# Patient Record
Sex: FEMALE | Race: WHITE | NOT HISPANIC OR LATINO
[De-identification: names, ages, dates, MRNs, and addresses within clinical notes are randomized per-mention and may not be internally consistent; named-entity substitution may affect disease eponyms.]

---
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
## 2020-04-28 NOTE — ASU PREOP CHECKLIST, PEDIATRIC - TEMP(CELSIUS)
Outpatient Medications Marked as Taking for the 4/28/20 encounter (Refill) with Tc Price, DO   Medication Sig Dispense Refill   • metFORMIN (GLUCOPHAGE) 500 MG tablet Take 2 tablets by mouth 2 times daily (with meals). 360 tablet 3     Last refill 5/10/19   Last Visit: 2/20/2020  Next Visit: 8/20/2020    Labs:     Hemoglobin A1C (%)   Date Value   02/26/2020 7.7 (H)   05/10/2019 7.5 (H)       Refilled per [facility].   36.5

## 2020-10-13 ENCOUNTER — EMERGENCY (EMERGENCY)
Facility: HOSPITAL | Age: 20
LOS: 0 days | Discharge: HOME | End: 2020-10-13
Attending: EMERGENCY MEDICINE
Payer: COMMERCIAL

## 2020-10-13 VITALS
OXYGEN SATURATION: 97 % | SYSTOLIC BLOOD PRESSURE: 94 MMHG | RESPIRATION RATE: 18 BRPM | DIASTOLIC BLOOD PRESSURE: 52 MMHG | HEART RATE: 50 BPM | TEMPERATURE: 98 F

## 2020-10-13 DIAGNOSIS — R10.9 UNSPECIFIED ABDOMINAL PAIN: ICD-10-CM

## 2020-10-13 DIAGNOSIS — Z87.19 PERSONAL HISTORY OF OTHER DISEASES OF THE DIGESTIVE SYSTEM: ICD-10-CM

## 2020-10-13 DIAGNOSIS — G40.909 EPILEPSY, UNSPECIFIED, NOT INTRACTABLE, WITHOUT STATUS EPILEPTICUS: ICD-10-CM

## 2020-10-13 DIAGNOSIS — K90.49 MALABSORPTION DUE TO INTOLERANCE, NOT ELSEWHERE CLASSIFIED: ICD-10-CM

## 2020-10-13 DIAGNOSIS — E28.2 POLYCYSTIC OVARIAN SYNDROME: ICD-10-CM

## 2020-10-13 DIAGNOSIS — Z79.4 LONG TERM (CURRENT) USE OF INSULIN: ICD-10-CM

## 2020-10-13 DIAGNOSIS — R11.0 NAUSEA: ICD-10-CM

## 2020-10-13 DIAGNOSIS — Z96.41 PRESENCE OF INSULIN PUMP (EXTERNAL) (INTERNAL): ICD-10-CM

## 2020-10-13 DIAGNOSIS — Z91.012 ALLERGY TO EGGS: ICD-10-CM

## 2020-10-13 DIAGNOSIS — Z98.89 OTHER SPECIFIED POSTPROCEDURAL STATES: Chronic | ICD-10-CM

## 2020-10-13 DIAGNOSIS — Z88.8 ALLERGY STATUS TO OTHER DRUGS, MEDICAMENTS AND BIOLOGICAL SUBSTANCES: ICD-10-CM

## 2020-10-13 DIAGNOSIS — Z86.39 PERSONAL HISTORY OF OTHER ENDOCRINE, NUTRITIONAL AND METABOLIC DISEASE: ICD-10-CM

## 2020-10-13 DIAGNOSIS — K76.0 FATTY (CHANGE OF) LIVER, NOT ELSEWHERE CLASSIFIED: ICD-10-CM

## 2020-10-13 DIAGNOSIS — E11.9 TYPE 2 DIABETES MELLITUS WITHOUT COMPLICATIONS: ICD-10-CM

## 2020-10-13 LAB
ALBUMIN SERPL ELPH-MCNC: 3.7 G/DL — SIGNIFICANT CHANGE UP (ref 3.3–5)
ALP SERPL-CCNC: 85 U/L — SIGNIFICANT CHANGE UP (ref 40–120)
ALT FLD-CCNC: 24 U/L — SIGNIFICANT CHANGE UP (ref 12–78)
ANION GAP SERPL CALC-SCNC: 6 MMOL/L — SIGNIFICANT CHANGE UP (ref 5–17)
APPEARANCE UR: CLEAR — SIGNIFICANT CHANGE UP
AST SERPL-CCNC: 24 U/L — SIGNIFICANT CHANGE UP (ref 15–37)
BASOPHILS # BLD AUTO: 0.02 K/UL — SIGNIFICANT CHANGE UP (ref 0–0.2)
BASOPHILS NFR BLD AUTO: 0.4 % — SIGNIFICANT CHANGE UP (ref 0–2)
BILIRUB SERPL-MCNC: 0.4 MG/DL — SIGNIFICANT CHANGE UP (ref 0.2–1.2)
BILIRUB UR-MCNC: NEGATIVE — SIGNIFICANT CHANGE UP
BUN SERPL-MCNC: 11 MG/DL — SIGNIFICANT CHANGE UP (ref 7–23)
CALCIUM SERPL-MCNC: 9.1 MG/DL — SIGNIFICANT CHANGE UP (ref 8.5–10.1)
CHLORIDE SERPL-SCNC: 106 MMOL/L — SIGNIFICANT CHANGE UP (ref 96–108)
CO2 SERPL-SCNC: 25 MMOL/L — SIGNIFICANT CHANGE UP (ref 22–31)
COLOR SPEC: YELLOW — SIGNIFICANT CHANGE UP
CREAT SERPL-MCNC: 1.04 MG/DL — SIGNIFICANT CHANGE UP (ref 0.5–1.3)
DIFF PNL FLD: NEGATIVE — SIGNIFICANT CHANGE UP
EOSINOPHIL # BLD AUTO: 0.11 K/UL — SIGNIFICANT CHANGE UP (ref 0–0.5)
EOSINOPHIL NFR BLD AUTO: 2 % — SIGNIFICANT CHANGE UP (ref 0–6)
GLUCOSE SERPL-MCNC: 92 MG/DL — SIGNIFICANT CHANGE UP (ref 70–99)
GLUCOSE UR QL: NEGATIVE MG/DL — SIGNIFICANT CHANGE UP
HCG SERPL-ACNC: <1 MIU/ML — SIGNIFICANT CHANGE UP
HCT VFR BLD CALC: 38.9 % — SIGNIFICANT CHANGE UP (ref 34.5–45)
HGB BLD-MCNC: 12.4 G/DL — SIGNIFICANT CHANGE UP (ref 11.5–15.5)
IMM GRANULOCYTES NFR BLD AUTO: 0.2 % — SIGNIFICANT CHANGE UP (ref 0–1.5)
KETONES UR-MCNC: ABNORMAL
LEUKOCYTE ESTERASE UR-ACNC: ABNORMAL
LIDOCAIN IGE QN: 19 U/L — LOW (ref 73–393)
LYMPHOCYTES # BLD AUTO: 2.63 K/UL — SIGNIFICANT CHANGE UP (ref 1–3.3)
LYMPHOCYTES # BLD AUTO: 48.3 % — HIGH (ref 13–44)
MCHC RBC-ENTMCNC: 30.6 PG — SIGNIFICANT CHANGE UP (ref 27–34)
MCHC RBC-ENTMCNC: 31.9 GM/DL — LOW (ref 32–36)
MCV RBC AUTO: 96 FL — SIGNIFICANT CHANGE UP (ref 80–100)
MONOCYTES # BLD AUTO: 0.21 K/UL — SIGNIFICANT CHANGE UP (ref 0–0.9)
MONOCYTES NFR BLD AUTO: 3.9 % — SIGNIFICANT CHANGE UP (ref 2–14)
NEUTROPHILS # BLD AUTO: 2.47 K/UL — SIGNIFICANT CHANGE UP (ref 1.8–7.4)
NEUTROPHILS NFR BLD AUTO: 45.2 % — SIGNIFICANT CHANGE UP (ref 43–77)
NITRITE UR-MCNC: NEGATIVE — SIGNIFICANT CHANGE UP
PH UR: 5 — SIGNIFICANT CHANGE UP (ref 5–8)
PLATELET # BLD AUTO: 224 K/UL — SIGNIFICANT CHANGE UP (ref 150–400)
POTASSIUM SERPL-MCNC: 4.1 MMOL/L — SIGNIFICANT CHANGE UP (ref 3.5–5.3)
POTASSIUM SERPL-SCNC: 4.1 MMOL/L — SIGNIFICANT CHANGE UP (ref 3.5–5.3)
PROT SERPL-MCNC: 8 GM/DL — SIGNIFICANT CHANGE UP (ref 6–8.3)
PROT UR-MCNC: NEGATIVE MG/DL — SIGNIFICANT CHANGE UP
RBC # BLD: 4.05 M/UL — SIGNIFICANT CHANGE UP (ref 3.8–5.2)
RBC # FLD: 12.6 % — SIGNIFICANT CHANGE UP (ref 10.3–14.5)
SODIUM SERPL-SCNC: 137 MMOL/L — SIGNIFICANT CHANGE UP (ref 135–145)
SP GR SPEC: 1 — LOW (ref 1.01–1.02)
UROBILINOGEN FLD QL: NEGATIVE MG/DL — SIGNIFICANT CHANGE UP
WBC # BLD: 5.45 K/UL — SIGNIFICANT CHANGE UP (ref 3.8–10.5)
WBC # FLD AUTO: 5.45 K/UL — SIGNIFICANT CHANGE UP (ref 3.8–10.5)

## 2020-10-13 PROCEDURE — 85025 COMPLETE CBC W/AUTO DIFF WBC: CPT

## 2020-10-13 PROCEDURE — 76705 ECHO EXAM OF ABDOMEN: CPT

## 2020-10-13 PROCEDURE — 36415 COLL VENOUS BLD VENIPUNCTURE: CPT

## 2020-10-13 PROCEDURE — 99284 EMERGENCY DEPT VISIT MOD MDM: CPT | Mod: 25

## 2020-10-13 PROCEDURE — 83690 ASSAY OF LIPASE: CPT

## 2020-10-13 PROCEDURE — 99284 EMERGENCY DEPT VISIT MOD MDM: CPT

## 2020-10-13 PROCEDURE — 81001 URINALYSIS AUTO W/SCOPE: CPT

## 2020-10-13 PROCEDURE — 76705 ECHO EXAM OF ABDOMEN: CPT | Mod: 26

## 2020-10-13 PROCEDURE — 84702 CHORIONIC GONADOTROPIN TEST: CPT

## 2020-10-13 PROCEDURE — 80053 COMPREHEN METABOLIC PANEL: CPT

## 2020-10-13 RX ORDER — LIDOCAINE 4 G/100G
10 CREAM TOPICAL ONCE
Refills: 0 | Status: COMPLETED | OUTPATIENT
Start: 2020-10-13 | End: 2020-10-13

## 2020-10-13 RX ORDER — SODIUM CHLORIDE 9 MG/ML
1000 INJECTION INTRAMUSCULAR; INTRAVENOUS; SUBCUTANEOUS ONCE
Refills: 0 | Status: COMPLETED | OUTPATIENT
Start: 2020-10-13 | End: 2020-10-13

## 2020-10-13 RX ADMIN — Medication 30 MILLILITER(S): at 19:00

## 2020-10-13 RX ADMIN — LIDOCAINE 10 MILLILITER(S): 4 CREAM TOPICAL at 19:00

## 2020-10-13 RX ADMIN — SODIUM CHLORIDE 1000 MILLILITER(S): 9 INJECTION INTRAMUSCULAR; INTRAVENOUS; SUBCUTANEOUS at 17:25

## 2020-10-13 NOTE — ED STATDOCS - NS_ATTENDINGSCRIBE_ED_ALL_ED
MARK ANTHONY Almonte
I personally performed the service described in the documentation recorded by the scribe in my presence, and it accurately and completely records my words and actions.

## 2020-10-13 NOTE — ED STATDOCS - OBJECTIVE STATEMENT
19 y/o female with PMHx of DM on insulin pump, pancreatitis with pancreatic stent, ADD, epilepsy, hepatic steatosis, polycystic ovarian syndrome presents to the ED c/o abd pain x13 days. Pt's mother states she is on a liquid diet, and taking Tylenol and Pepcid, and has been feeling increasing uncomfortable. Denies vomiting, endorses nausea, abd discomfort. GI: Dr. Ireland.

## 2020-10-13 NOTE — ED STATDOCS - PROGRESS NOTE DETAILS
signed Annia Crockett PA-C Pt seen initially in intake by Dr Chang.   20F here for abd pain x 2 weeks, has hx of pancreatitis. TRIP Ireland. No significant findings on labwork or imaging. Pt notes she has pain after eating, but tylenol and advil helps. Advise no apparent pancreatitis on labs or imaging today. Recommend stop Advil, continue pepcid, add Mylanta or Tums PRN. f/u Shu tomorrow. return precautions given. I spoke to Dr Ireland on phone regarding pt, he agrees with DC and plan of care. Pt feeling well, pt and family agree with DC and plan of care.

## 2020-10-13 NOTE — ED STATDOCS - CLINICAL SUMMARY MEDICAL DECISION MAKING FREE TEXT BOX
19 y/o with DM with recurrent pancreatitis told to come to ED by GI Dr. Ireland. Pt in no acute distress, non pain. Will check labs, IV fluids, put a call out to Shu.

## 2020-10-13 NOTE — ED STATDOCS - ATTENDING CONTRIBUTION TO CARE
I,Freedom Chang MD,  performed the initial face to face bedside interview with this patient regarding history of present illness, review of symptoms and relevant past medical, social and family history.  I completed an independent physical examination.  I was the initial provider who evaluated this patient. I have signed out the follow up of any pending tests (i.e. labs, radiological studies) to the ACP.  I have communicated the patient’s plan of care and disposition with the ACP.  The history, relevant review of systems, past medical and surgical history, medical decision making, and physical examination was documented by the scribe in my presence and I attest to the accuracy of the documentation.

## 2020-10-13 NOTE — ED STATDOCS - PATIENT PORTAL LINK FT
You can access the FollowMyHealth Patient Portal offered by [facility] by registering at the following website: [email or web address]. By joining [employer]’s FollowMyHealth portal, you will also be able to view your health information using other applications (apps) compatible with our system.

## 2020-10-13 NOTE — ED ADULT NURSE NOTE - OBJECTIVE STATEMENT
Pt c/o epigastric pain radiating to the left abd. h/o pancreatitis, diabetes ,epilepsy. Pt has Insulin pump and cover herself with Novolog.

## 2020-10-13 NOTE — ED STATDOCS - NSFOLLOWUPINSTRUCTIONS_ED_ALL_ED_FT
Acute Abdominal Pain    WHAT YOU NEED TO KNOW:    The cause of your abdominal pain may not be found. If a cause is found, treatment will depend on what the cause is.     DISCHARGE INSTRUCTIONS:    Return to the emergency department if:     You vomit blood or cannot stop vomiting.      You have blood in your bowel movement or it looks like tar.       You have bleeding from your rectum.       Your abdomen is larger than usual, more painful, and hard.       You have severe pain in your abdomen.       You stop passing gas and having bowel movements.       You feel weak, dizzy, or faint.    Contact your healthcare provider if:     You have a fever.      You have new signs and symptoms.      Your symptoms do not get better with treatment.       You have questions or concerns about your condition or care.    Medicines may be given to decrease pain, treat an infection, and manage your symptoms. Take your medicine as directed. Call your healthcare provider if you think your medicine is not helping or if you have side effects. Tell him if you are allergic to any medicine. Keep a list of the medicines, vitamins, and herbs you take. Include the amounts, and when and why you take them. Bring the list or the pill bottles to follow-up visits. Carry your medicine list with you in case of an emergency.    Manage your symptoms:     Apply heat on your abdomen for 20 to 30 minutes every 2 hours for as many days as directed. Heat helps decrease pain and muscle spasms.       Manage your stress. Stress may cause abdominal pain. Your healthcare provider may recommend relaxation techniques and deep breathing exercises to help decrease your stress. Your healthcare provider may recommend you talk to someone about your stress or anxiety, such as a counselor or a trusted friend. Get plenty of sleep and exercise regularly.       Limit or do not drink alcohol. Alcohol can make your abdominal pain worse. Ask your healthcare provider if it is safe for you to drink alcohol. Also ask how much is safe for you to drink.       Do not smoke. Nicotine and other chemicals in cigarettes can damage your esophagus and stomach. Ask your healthcare provider for information if you currently smoke and need help to quit. E-cigarettes or smokeless tobacco still contain nicotine. Talk to your healthcare provider before you use these products.     Make changes to the food you eat as directed: Do not eat foods that cause abdominal pain or other symptoms. Eat small meals more often.     Eat more high-fiber foods if you are constipated. High-fiber foods include fruits, vegetables, whole-grain foods, and legumes.       Do not eat foods that cause gas if you have bloating. Examples include broccoli, cabbage, and cauliflower. Do not drink soda or carbonated drinks, because these may also cause gas.       Do not eat foods or drinks that contain sorbitol or fructose if you have diarrhea and bloating. Some examples are fruit juices, candy, jelly, and sugar-free gum.       Do not eat high-fat foods, such as fried foods, cheeseburgers, hot dogs, and desserts.      Limit or do not drink caffeine. Caffeine may make symptoms, such as heart burn or nausea, worse.       Drink plenty of liquids to prevent dehydration from diarrhea or vomiting. Ask your healthcare provider how much liquid to drink each day and which liquids are best for you.     Follow up with your healthcare provider as directed: Write down your questions so you remember to ask them during your visits.     CALL DR HODGES TOMORROW FOR AN APPOINTMENT. RETURN TO ER FOR ANY WORSENING SYMPTOMS OR NEW CONCERNS. CONTINUE DAILY PEPCID, TAKE MYLANTA OR TUMS AS NEEDED. FOLLOW THE DIRECTIONS ON THE BOTTLE. DON'T TAKE ASPIRIN OR ADVIL.

## 2020-10-13 NOTE — ED STATDOCS - PMH
Absence seizure    ADD (attention deficit disorder)    Anxiety    Auditory processing disorder    Constipation    Hepatic steatosis    Pancreatic divisum    Pancreatitis  h/o multiple previous pancreatitis episoeds, [place] [facility] [place] Heterozygous CFTR gene mutation  Polycystic ovarian syndrome    Seasonal allergies    Seasonal allergies

## 2020-10-27 ENCOUNTER — TRANSCRIPTION ENCOUNTER (OUTPATIENT)
Age: 20
End: 2020-10-27

## 2020-10-29 ENCOUNTER — TRANSCRIPTION ENCOUNTER (OUTPATIENT)
Age: 20
End: 2020-10-29

## 2020-12-25 ENCOUNTER — TRANSCRIPTION ENCOUNTER (OUTPATIENT)
Age: 20
End: 2020-12-25

## 2021-01-11 ENCOUNTER — TRANSCRIPTION ENCOUNTER (OUTPATIENT)
Age: 21
End: 2021-01-11

## 2021-02-11 ENCOUNTER — TRANSCRIPTION ENCOUNTER (OUTPATIENT)
Age: 21
End: 2021-02-11

## 2021-04-14 ENCOUNTER — INPATIENT (INPATIENT)
Facility: HOSPITAL | Age: 21
LOS: 7 days | Discharge: HOME | DRG: 439 | End: 2021-04-22
Attending: INTERNAL MEDICINE | Admitting: INTERNAL MEDICINE
Payer: COMMERCIAL

## 2021-04-14 VITALS
WEIGHT: 153 LBS | TEMPERATURE: 98 F | HEIGHT: 61 IN | RESPIRATION RATE: 18 BRPM | SYSTOLIC BLOOD PRESSURE: 123 MMHG | DIASTOLIC BLOOD PRESSURE: 76 MMHG | HEART RATE: 110 BPM | OXYGEN SATURATION: 96 %

## 2021-04-14 DIAGNOSIS — K85.90 ACUTE PANCREATITIS WITHOUT NECROSIS OR INFECTION, UNSPECIFIED: ICD-10-CM

## 2021-04-14 DIAGNOSIS — F84.0 AUTISTIC DISORDER: ICD-10-CM

## 2021-04-14 DIAGNOSIS — E11.9 TYPE 2 DIABETES MELLITUS WITHOUT COMPLICATIONS: ICD-10-CM

## 2021-04-14 DIAGNOSIS — Z29.9 ENCOUNTER FOR PROPHYLACTIC MEASURES, UNSPECIFIED: ICD-10-CM

## 2021-04-14 DIAGNOSIS — Z98.89 OTHER SPECIFIED POSTPROCEDURAL STATES: Chronic | ICD-10-CM

## 2021-04-14 LAB
ALBUMIN SERPL ELPH-MCNC: 3.6 G/DL — SIGNIFICANT CHANGE UP (ref 3.3–5)
ALP SERPL-CCNC: 111 U/L — SIGNIFICANT CHANGE UP (ref 40–120)
ALT FLD-CCNC: 113 U/L — HIGH (ref 12–78)
ANION GAP SERPL CALC-SCNC: 9 MMOL/L — SIGNIFICANT CHANGE UP (ref 5–17)
APPEARANCE UR: ABNORMAL
AST SERPL-CCNC: 84 U/L — HIGH (ref 15–37)
BACTERIA # UR AUTO: ABNORMAL
BASOPHILS # BLD AUTO: 0.03 K/UL — SIGNIFICANT CHANGE UP (ref 0–0.2)
BASOPHILS NFR BLD AUTO: 0.3 % — SIGNIFICANT CHANGE UP (ref 0–2)
BILIRUB SERPL-MCNC: 0.4 MG/DL — SIGNIFICANT CHANGE UP (ref 0.2–1.2)
BILIRUB UR-MCNC: NEGATIVE — SIGNIFICANT CHANGE UP
BUN SERPL-MCNC: 15 MG/DL — SIGNIFICANT CHANGE UP (ref 7–23)
CALCIUM SERPL-MCNC: 9.6 MG/DL — SIGNIFICANT CHANGE UP (ref 8.5–10.1)
CHLORIDE SERPL-SCNC: 103 MMOL/L — SIGNIFICANT CHANGE UP (ref 96–108)
CO2 SERPL-SCNC: 24 MMOL/L — SIGNIFICANT CHANGE UP (ref 22–31)
COLOR SPEC: YELLOW — SIGNIFICANT CHANGE UP
CREAT SERPL-MCNC: 0.83 MG/DL — SIGNIFICANT CHANGE UP (ref 0.5–1.3)
DIFF PNL FLD: NEGATIVE — SIGNIFICANT CHANGE UP
EOSINOPHIL # BLD AUTO: 0.07 K/UL — SIGNIFICANT CHANGE UP (ref 0–0.5)
EOSINOPHIL NFR BLD AUTO: 0.8 % — SIGNIFICANT CHANGE UP (ref 0–6)
EPI CELLS # UR: SIGNIFICANT CHANGE UP
GLUCOSE SERPL-MCNC: 222 MG/DL — HIGH (ref 70–99)
GLUCOSE UR QL: 100 MG/DL
HCG SERPL-ACNC: <1 MIU/ML — SIGNIFICANT CHANGE UP
HCT VFR BLD CALC: 41.2 % — SIGNIFICANT CHANGE UP (ref 34.5–45)
HGB BLD-MCNC: 13.7 G/DL — SIGNIFICANT CHANGE UP (ref 11.5–15.5)
IMM GRANULOCYTES NFR BLD AUTO: 0.2 % — SIGNIFICANT CHANGE UP (ref 0–1.5)
KETONES UR-MCNC: ABNORMAL
LACTATE SERPL-SCNC: 1.3 MMOL/L — SIGNIFICANT CHANGE UP (ref 0.7–2)
LACTATE SERPL-SCNC: 2.2 MMOL/L — HIGH (ref 0.7–2)
LEUKOCYTE ESTERASE UR-ACNC: ABNORMAL
LIDOCAIN IGE QN: 274 U/L — SIGNIFICANT CHANGE UP (ref 73–393)
LYMPHOCYTES # BLD AUTO: 2.08 K/UL — SIGNIFICANT CHANGE UP (ref 1–3.3)
LYMPHOCYTES # BLD AUTO: 23 % — SIGNIFICANT CHANGE UP (ref 13–44)
MCHC RBC-ENTMCNC: 29.3 PG — SIGNIFICANT CHANGE UP (ref 27–34)
MCHC RBC-ENTMCNC: 33.3 GM/DL — SIGNIFICANT CHANGE UP (ref 32–36)
MCV RBC AUTO: 88 FL — SIGNIFICANT CHANGE UP (ref 80–100)
MONOCYTES # BLD AUTO: 0.47 K/UL — SIGNIFICANT CHANGE UP (ref 0–0.9)
MONOCYTES NFR BLD AUTO: 5.2 % — SIGNIFICANT CHANGE UP (ref 2–14)
NEUTROPHILS # BLD AUTO: 6.38 K/UL — SIGNIFICANT CHANGE UP (ref 1.8–7.4)
NEUTROPHILS NFR BLD AUTO: 70.5 % — SIGNIFICANT CHANGE UP (ref 43–77)
NITRITE UR-MCNC: NEGATIVE — SIGNIFICANT CHANGE UP
NRBC # BLD: 0 /100 WBCS — SIGNIFICANT CHANGE UP (ref 0–0)
PH UR: 5 — SIGNIFICANT CHANGE UP (ref 5–8)
PLATELET # BLD AUTO: 350 K/UL — SIGNIFICANT CHANGE UP (ref 150–400)
POTASSIUM SERPL-MCNC: 4 MMOL/L — SIGNIFICANT CHANGE UP (ref 3.5–5.3)
POTASSIUM SERPL-SCNC: 4 MMOL/L — SIGNIFICANT CHANGE UP (ref 3.5–5.3)
PROT SERPL-MCNC: 8.8 G/DL — HIGH (ref 6–8.3)
PROT UR-MCNC: 15
RBC # BLD: 4.68 M/UL — SIGNIFICANT CHANGE UP (ref 3.8–5.2)
RBC # FLD: 12.3 % — SIGNIFICANT CHANGE UP (ref 10.3–14.5)
SARS-COV-2 RNA SPEC QL NAA+PROBE: SIGNIFICANT CHANGE UP
SODIUM SERPL-SCNC: 136 MMOL/L — SIGNIFICANT CHANGE UP (ref 135–145)
SP GR SPEC: 1.01 — SIGNIFICANT CHANGE UP (ref 1.01–1.02)
UROBILINOGEN FLD QL: NEGATIVE — SIGNIFICANT CHANGE UP
WBC # BLD: 9.05 K/UL — SIGNIFICANT CHANGE UP (ref 3.8–10.5)
WBC # FLD AUTO: 9.05 K/UL — SIGNIFICANT CHANGE UP (ref 3.8–10.5)
WBC UR QL: ABNORMAL

## 2021-04-14 PROCEDURE — 99285 EMERGENCY DEPT VISIT HI MDM: CPT

## 2021-04-14 PROCEDURE — 93010 ELECTROCARDIOGRAM REPORT: CPT | Mod: 77

## 2021-04-14 PROCEDURE — 93010 ELECTROCARDIOGRAM REPORT: CPT

## 2021-04-14 PROCEDURE — G1004: CPT

## 2021-04-14 PROCEDURE — 74177 CT ABD & PELVIS W/CONTRAST: CPT | Mod: 26,ME

## 2021-04-14 RX ORDER — DEXTROSE 50 % IN WATER 50 %
25 SYRINGE (ML) INTRAVENOUS ONCE
Refills: 0 | Status: DISCONTINUED | OUTPATIENT
Start: 2021-04-14 | End: 2021-04-22

## 2021-04-14 RX ORDER — SODIUM CHLORIDE 9 MG/ML
1000 INJECTION, SOLUTION INTRAVENOUS
Refills: 0 | Status: DISCONTINUED | OUTPATIENT
Start: 2021-04-14 | End: 2021-04-22

## 2021-04-14 RX ORDER — ASCORBIC ACID 60 MG
500 TABLET,CHEWABLE ORAL DAILY
Refills: 0 | Status: DISCONTINUED | OUTPATIENT
Start: 2021-04-14 | End: 2021-04-22

## 2021-04-14 RX ORDER — ONDANSETRON 8 MG/1
4 TABLET, FILM COATED ORAL ONCE
Refills: 0 | Status: COMPLETED | OUTPATIENT
Start: 2021-04-14 | End: 2021-04-14

## 2021-04-14 RX ORDER — INSULIN LISPRO 100/ML
VIAL (ML) SUBCUTANEOUS AT BEDTIME
Refills: 0 | Status: DISCONTINUED | OUTPATIENT
Start: 2021-04-14 | End: 2021-04-16

## 2021-04-14 RX ORDER — GABAPENTIN 400 MG/1
300 CAPSULE ORAL
Refills: 0 | Status: DISCONTINUED | OUTPATIENT
Start: 2021-04-14 | End: 2021-04-22

## 2021-04-14 RX ORDER — SODIUM CHLORIDE 9 MG/ML
1000 INJECTION, SOLUTION INTRAVENOUS
Refills: 0 | Status: DISCONTINUED | OUTPATIENT
Start: 2021-04-14 | End: 2021-04-15

## 2021-04-14 RX ORDER — POLYETHYLENE GLYCOL 3350 17 G/17G
17 POWDER, FOR SOLUTION ORAL DAILY
Refills: 0 | Status: DISCONTINUED | OUTPATIENT
Start: 2021-04-14 | End: 2021-04-22

## 2021-04-14 RX ORDER — LIPASE/PROTEASE/AMYLASE 16-48-48K
2 CAPSULE,DELAYED RELEASE (ENTERIC COATED) ORAL
Refills: 0 | Status: DISCONTINUED | OUTPATIENT
Start: 2021-04-14 | End: 2021-04-22

## 2021-04-14 RX ORDER — MORPHINE SULFATE 50 MG/1
4 CAPSULE, EXTENDED RELEASE ORAL ONCE
Refills: 0 | Status: DISCONTINUED | OUTPATIENT
Start: 2021-04-14 | End: 2021-04-14

## 2021-04-14 RX ORDER — SODIUM CHLORIDE 9 MG/ML
1000 INJECTION INTRAMUSCULAR; INTRAVENOUS; SUBCUTANEOUS ONCE
Refills: 0 | Status: COMPLETED | OUTPATIENT
Start: 2021-04-14 | End: 2021-04-14

## 2021-04-14 RX ORDER — INSULIN LISPRO 100/ML
VIAL (ML) SUBCUTANEOUS
Refills: 0 | Status: DISCONTINUED | OUTPATIENT
Start: 2021-04-14 | End: 2021-04-16

## 2021-04-14 RX ORDER — MORPHINE SULFATE 50 MG/1
1 CAPSULE, EXTENDED RELEASE ORAL
Refills: 0 | Status: DISCONTINUED | OUTPATIENT
Start: 2021-04-14 | End: 2021-04-19

## 2021-04-14 RX ORDER — MORPHINE SULFATE 50 MG/1
2 CAPSULE, EXTENDED RELEASE ORAL EVERY 4 HOURS
Refills: 0 | Status: DISCONTINUED | OUTPATIENT
Start: 2021-04-14 | End: 2021-04-19

## 2021-04-14 RX ORDER — PREGABALIN 225 MG/1
1000 CAPSULE ORAL DAILY
Refills: 0 | Status: DISCONTINUED | OUTPATIENT
Start: 2021-04-14 | End: 2021-04-22

## 2021-04-14 RX ORDER — LORATADINE 10 MG/1
10 TABLET ORAL DAILY
Refills: 0 | Status: DISCONTINUED | OUTPATIENT
Start: 2021-04-14 | End: 2021-04-22

## 2021-04-14 RX ORDER — MORPHINE SULFATE 50 MG/1
3 CAPSULE, EXTENDED RELEASE ORAL EVERY 4 HOURS
Refills: 0 | Status: DISCONTINUED | OUTPATIENT
Start: 2021-04-14 | End: 2021-04-19

## 2021-04-14 RX ORDER — GLUCAGON INJECTION, SOLUTION 0.5 MG/.1ML
1 INJECTION, SOLUTION SUBCUTANEOUS ONCE
Refills: 0 | Status: DISCONTINUED | OUTPATIENT
Start: 2021-04-14 | End: 2021-04-22

## 2021-04-14 RX ORDER — URSODIOL 250 MG/1
300 TABLET, FILM COATED ORAL
Refills: 0 | Status: DISCONTINUED | OUTPATIENT
Start: 2021-04-14 | End: 2021-04-22

## 2021-04-14 RX ORDER — LIPASE/PROTEASE/AMYLASE 16-48-48K
1 CAPSULE,DELAYED RELEASE (ENTERIC COATED) ORAL DAILY
Refills: 0 | Status: DISCONTINUED | OUTPATIENT
Start: 2021-04-14 | End: 2021-04-22

## 2021-04-14 RX ORDER — ONDANSETRON 8 MG/1
4 TABLET, FILM COATED ORAL EVERY 6 HOURS
Refills: 0 | Status: DISCONTINUED | OUTPATIENT
Start: 2021-04-14 | End: 2021-04-22

## 2021-04-14 RX ORDER — ENOXAPARIN SODIUM 100 MG/ML
40 INJECTION SUBCUTANEOUS DAILY
Refills: 0 | Status: DISCONTINUED | OUTPATIENT
Start: 2021-04-14 | End: 2021-04-22

## 2021-04-14 RX ORDER — DEXTROSE 50 % IN WATER 50 %
12.5 SYRINGE (ML) INTRAVENOUS ONCE
Refills: 0 | Status: DISCONTINUED | OUTPATIENT
Start: 2021-04-14 | End: 2021-04-22

## 2021-04-14 RX ORDER — DEXTROSE 50 % IN WATER 50 %
15 SYRINGE (ML) INTRAVENOUS ONCE
Refills: 0 | Status: DISCONTINUED | OUTPATIENT
Start: 2021-04-14 | End: 2021-04-22

## 2021-04-14 RX ORDER — SODIUM CHLORIDE 9 MG/ML
1000 INJECTION INTRAMUSCULAR; INTRAVENOUS; SUBCUTANEOUS
Refills: 0 | Status: DISCONTINUED | OUTPATIENT
Start: 2021-04-14 | End: 2021-04-14

## 2021-04-14 RX ORDER — FLUTICASONE PROPIONATE 50 MCG
1 SPRAY, SUSPENSION NASAL
Refills: 0 | Status: DISCONTINUED | OUTPATIENT
Start: 2021-04-14 | End: 2021-04-22

## 2021-04-14 RX ORDER — FAMOTIDINE 10 MG/ML
40 INJECTION INTRAVENOUS
Refills: 0 | Status: DISCONTINUED | OUTPATIENT
Start: 2021-04-14 | End: 2021-04-22

## 2021-04-14 RX ADMIN — MORPHINE SULFATE 4 MILLIGRAM(S): 50 CAPSULE, EXTENDED RELEASE ORAL at 19:23

## 2021-04-14 RX ADMIN — ONDANSETRON 4 MILLIGRAM(S): 8 TABLET, FILM COATED ORAL at 16:24

## 2021-04-14 RX ADMIN — MORPHINE SULFATE 4 MILLIGRAM(S): 50 CAPSULE, EXTENDED RELEASE ORAL at 19:40

## 2021-04-14 RX ADMIN — SODIUM CHLORIDE 1000 MILLILITER(S): 9 INJECTION INTRAMUSCULAR; INTRAVENOUS; SUBCUTANEOUS at 19:00

## 2021-04-14 RX ADMIN — MORPHINE SULFATE 4 MILLIGRAM(S): 50 CAPSULE, EXTENDED RELEASE ORAL at 16:25

## 2021-04-14 RX ADMIN — SODIUM CHLORIDE 1000 MILLILITER(S): 9 INJECTION INTRAMUSCULAR; INTRAVENOUS; SUBCUTANEOUS at 16:23

## 2021-04-14 RX ADMIN — SODIUM CHLORIDE 125 MILLILITER(S): 9 INJECTION, SOLUTION INTRAVENOUS at 23:48

## 2021-04-14 NOTE — H&P ADULT - NEUROLOGICAL DETAILS
alert and oriented x 3 alert and oriented x 3/sensation intact/cranial nerves intact/normal strength

## 2021-04-14 NOTE — H&P ADULT - NSHPOUTPATIENTPROVIDERS_GEN_ALL_CORE
PCP: Dr. Socorro Sr PCP: Dr. Socorro Sr  Endo: Dr. Veronica Hernandez  GI: Dr. Rodrigo Schilling  Surgery: Dr. Pathak  Pain Management: Dr. Welch

## 2021-04-14 NOTE — H&P ADULT - RS GEN PE MLT RESP DETAILS PC
normal/breath sounds equal/clear to auscultation bilaterally/no rales/no rhonchi/no wheezes normal/breath sounds equal/good air movement/respirations non-labored/clear to auscultation bilaterally/no rales/no rhonchi/no wheezes

## 2021-04-14 NOTE — H&P ADULT - ASSESSMENT
Patient is a 20 yo female from [place] with PMH of autism, chronic pancreatitis, IDDM type ___ presents to the ED c/o epigastric pain x past several days admitted for acute pancreatitis Patient is a 22 yo [place] with PMH of autism, chronic pancreatitis, IDDM type 1 presents to the ED c/o epigastric pain x past several days admitted for acute pancreatitis

## 2021-04-14 NOTE — ED PROVIDER NOTE - IV ALTEPLASE DOOR HIDDEN
Patient scheduled appointment via [employer]. Please see comments below.      Appointment For: Apriluli Miller ([id number])   Visit Type: Gail Mckay ([id number])      1/16/2020    2:45 PM  15 mins. Toño Thompson MD      [facility]      Patient Comments: show

## 2021-04-14 NOTE — H&P ADULT - NSHPSOCIALHISTORY_GEN_ALL_CORE
Denies any ETOH, tobacco, or illicit drug use  Lives in group home for ASD  Independent in all ADLs  Ambulates independently w/ no assistive device  (+) COVID vaccine _________  LMP ______ Denies any ETOH, tobacco, or illicit drug use  Lives in group home for ASD  Independent in all ADLs  Ambulates independently w/ no assistive device  Received Moderna 2 of 2  Does not recall LMP, ~few months ago, hx of PCOS

## 2021-04-14 NOTE — ED ADULT NURSE REASSESSMENT NOTE - NS ED NURSE REASSESS COMMENT FT1
Pt has insulin pump to right abdomen, site clean dry and intact. Pt reports she thinks she changed it yesterday or the day before. MD Atkins aware of pump, per MD Atkins, resident MD to speak with pt's mother as well. Attempted to called Pat Weil, unable to leave voicemail on phone. Pt has insulin pump to right abdomen, site clean dry and intact. Pt reports she thinks she changed it yesterday or the day before. MD Atkins aware of pump, per MD Atkins, resident MD to speak with pt's mother as well. Attempted to call Pat Weil, unable to leave voicemail on phone.

## 2021-04-14 NOTE — H&P ADULT - NSICDXPROBLEMPLAN1_GEN_ALL_CORE_FT
Patient w/ hx of recurrent chronic pancreatitis p/w epigastric pain, found to have acute pancreatitis w/ underlying chronic pancreatitis on CT a/p  -Does not meet SIRS criteria on admission  -S/p 1LNS, morphine 4mg IV x2, and zofran 4mg IV in the ED  -Start NS @ 150cc/hr  -Pain control w/ ______  -Zofran PRN nausea  -Diet _____  -GI, Dr. Ladd consulted Patient w/ hx of recurrent chronic pancreatitis p/w epigastric pain, found to have acute pancreatitis w/ underlying chronic pancreatitis   on CT a/p  -Does not meet SIRS criteria on admission  -S/p 1LNS, morphine 4mg IV x2, and zofran 4mg IV in the ED  -Start D5/NS @ 125cc/hr  -Pain control w/ morphine 2mg IV q4 for moderate, 3mg q4 for severe pain  -Zofran PRN nausea  -NPO except meds  -GI, Dr. Ladd consulted Patient w/ hx of recurrent chronic pancreatitis p/w epigastric pain, found to have acute pancreatitis w/ underlying chronic pancreatitis   on CT a/p  -Does not meet SIRS criteria on admission  -Denies ETOH use, no change in medications  -S/p 1LNS, morphine 4mg IV x2, and zofran 4mg IV in the ED  -Start D5/NS @ 125cc/hr  -Pain control w/ morphine 2mg IV q4 for moderate, 3mg q4 for severe pain  -Zofran PRN nausea  -NPO except meds  -f/u lipid panel  -GI, Dr. Ladd consulted Patient w/ hx of recurrent chronic pancreatitis p/w epigastric pain, found to have acute pancreatitis w/ underlying chronic pancreatitis ? Etiology   on CT a/p shows Ac Pancreatitis , No GB stones  -Does not meet SIRS criteria on admission  -Denies ETOH use, no change in medications  -S/p 1LNS, morphine 4mg IV x2, and zofran 4mg IV in the ED  -Start D5/NS @ 125cc/hr  -Pain control w/ Morphine 1 mg IV for Mild / morphine 2mg IV q4 for moderate, 3mg q4 for severe pain  -Zofran PRN nausea  -NPO except meds, On Pancreatic enzyme supplement  -f/u lipid panel  -GI-Dr Scarlet shea  -GI, Dr. Ladd consulted Patient w/ hx of recurrent chronic pancreatitis p/w epigastric pain, found to have acute pancreatitis w/ underlying chronic pancreatitis   ? Etiology   on CT a/p shows Ac Pancreatitis , No GB stones  -Does not meet SIRS criteria on admission  -Denies ETOH use, no change in medications  -S/p 1LNS, morphine 4mg IV x2, and zofran 4mg IV in the ED  -Start D5/NS @ 125cc/hr  -Pain control w/ Morphine 1 mg IV for Mild / morphine 2mg IV q4 for moderate, 3mg q4 for severe pain  -Zofran PRN nausea  -NPO except meds, On Pancreatic enzyme supplement  -f/u lipid panel  -GI-Dr Scarlet shea

## 2021-04-14 NOTE — H&P ADULT - NSICDXPROBLEMPLAN2_GEN_ALL_CORE_FT
Hx of ASD, from group home Hx of IDDM1, insulin pump present  -Dose of lantus not known and is autoregulated as per patient  -CONTINUE insulin pump, Dr. C Perlman to evaluate pump in AM--D/w endo  -Start D5/NS @ 125cc/hr  -Low dose ISS, accuchecks q6/hs while NPO Hx of IDDM1, insulin pump present  -Dose of lantus not known and is autoregulated as per patient  -CONTINUE insulin pump as pt is able to manage her pump, Dr. C Perlman to evaluate pump in AM--D/w endo  -Start D5/NS @ 125cc/hr  -Low dose ISS, accuchecks q6/hs while NPO, Hypoglycemia protocal

## 2021-04-14 NOTE — H&P ADULT - ATTENDING COMMENTS
Patient is a 20 yo female from [facility] with PMH of autism, chronic pancreatitis, IDDM type 1 presents to the ED c/o epigastric pain x past several days admitted for acute pancreatitis    Pt seen, examined, case & care plan d/w pt, residents at detail.  AM Labs   NPO with IV fluids .  D/W DR C perlman - Continue Home  Insulin Pump at this time , Pt is able to manage her Insulin pump at [facility]. Dr C perlman will take care of Insulin pump orders in the AM. D/W Pharmacy & ER Nurse.

## 2021-04-14 NOTE — ED PROVIDER NOTE - PROGRESS NOTE DETAILS
Pt with acute pancreatitis on CT, persistent pain - will admit, IV pain control. Dw Dr Conrad Atkins, will see pt to admit.

## 2021-04-14 NOTE — H&P ADULT - GASTROINTESTINAL DETAILS
soft/no distention/no guarding/no rigidity soft/no distention/no masses palpable/bowel sounds normal/no bruit/no guarding/no rigidity/no organomegaly

## 2021-04-14 NOTE — ED PROVIDER NOTE - OBJECTIVE STATEMENT
22 yo F from group home (takes care of autistic patients) p/w mid abd pain x past several days. NO fever/chills. some nausea, no v/d. No cp/sob/palp. no weakness / dizziness. no neck / back pain. Pt states this feels like an exacerbation of her chronic pancreatitis. no neck / back pain. no numb/ting/focal weak. No dizziness /  palp. pt is fully vaccinated to covid. No known recent covid exposures. no agg/allev factors. no other inj or co.

## 2021-04-14 NOTE — ED ADULT NURSE NOTE - OBJECTIVE STATEMENT
Pt reports abdominal pain and hx of pancreatitis. Pt lives at a group home, alert and oriented x3. Pt has insulin pump to right abdomen, type 1 diabetic. Pt yelling out in pain.

## 2021-04-14 NOTE — ED ADULT NURSE NOTE - NS TRANSFER PATIENT BELONGINGS
cell phone, , clothing, bracelet, stuffed animal, shoes/Cell Phone/PDA (specify)/Jewelry/Money (specify)/Clothing

## 2021-04-14 NOTE — ED PROVIDER NOTE - CARE PLAN
Principal Discharge DX:	Acute pancreatitis, unspecified complication status, unspecified pancreatitis type  Secondary Diagnosis:	Intractable pain

## 2021-04-14 NOTE — H&P ADULT - NSICDXPASTMEDICALHX_GEN_ALL_CORE_FT
PAST MEDICAL HISTORY:  Autism spectrum disorder     Chronic pancreatitis     Diabetes mellitus Insulin dependent

## 2021-04-14 NOTE — H&P ADULT - HISTORY OF PRESENT ILLNESS
Patient is a 20 [place] female from [place] with PMH of autism, chronic pancreatitis, IDDM type ___ presents to the ED c/o epigastric pain x past several days. Patient notes experiencing similar sx to her chronic pancreatitis. Patient notes mild nausea. Denies any fevers, chills, CP, SOB, vomiting, diarrhea, constipation, or back pain. Patientt is fully vaccinated to covid. No known recent covid exposures. no agg/allev factors. no other inj o    In the ED  VS- Temp 97.9F,  -->102, /76, RR 18, SpO2 97% on RA  Labs significant for lipase WNL, lactate 2.2, glu 222, AST 84,   CXR pending official read, wet read showed ____  CT a/p (+) acute pancreatitis, underlying chronic pancreatitis, hepatic steatosis  ECG showed _____  s/p 1L NS, morphine 4mg IV x2, zofran 4mg IV x1 in the ED Patient is a 22 yo female from [facility] with PMH of autism, chronic pancreatitis, IDDM type 1 (Insulin pump present), and PCOS presents to the ED c/o epigastric pain x 2 days. Patient states that her sx are similar sx to her episodes of chronic pancreatitis. Patient has frequent flare-ups of her pancreatitis, last being a few weeks ago and did not require hospital stay. Patient was scheduled for total pancreatectomy w/ islet cell transplantation into liver this past January, but the procedure was cancelled due to COVID pandemic. Patient states that her pain is well controlled s/p morphine 4mg IV in the ED. Patient notes mild nausea, which is improved s/p zofran. Denies any fevers, chills, CP, SOB, vomiting, diarrhea, constipation, or back pain. Patient received Moderna 2 of 2, last dose ~1 week ago. No known recent COVID exposures.    In the ED  VS- Temp 97.9F,  -->102, /76, RR 18, SpO2 97% on RA  Labs significant for lipase WNL, lactate 2.2, glu 222, AST 84,   CT a/p (+) acute pancreatitis, underlying chronic pancreatitis, hepatic steatosis  ECG showed sinus tachycardia, no signs of acute ischemia  s/p 1L NS, morphine 4mg IV x2, zofran 4mg IV x1 in the ED Patient is a 20 yo female from [facility] with PMH of autism, chronic pancreatitis, IDDM type 1 (Insulin pump present), and PCOS presents to the ED c/o epigastric pain x 2 days. Patient states that her sx are similar sx to her episodes of chronic pancreatitis. Patient has frequent flare-ups of her pancreatitis, last being a few weeks ago and did not require hospital stay. Patient was scheduled for total pancreatectomy w/ islet cell transplantation into liver this past January, but the procedure was cancelled due to COVID pandemic. Denies ETOH use, no change in medications. Patient states that her pain is well controlled s/p morphine 4mg IV in the ED. Patient notes mild nausea, which is improved s/p zofran. Denies any fevers, chills, CP, SOB, vomiting, diarrhea, constipation, or back pain. Patient received Moderna 2 of 2, last dose ~1 week ago. No known recent COVID exposures.    In the ED  VS- Temp 97.9F,  -->102, /76, RR 18, SpO2 97% on RA  Labs significant for lipase WNL, lactate 2.2, glu 222, AST 84,   CT a/p (+) acute pancreatitis, underlying chronic pancreatitis, hepatic steatosis  ECG showed sinus tachycardia, no signs of acute ischemia  s/p 1L NS, morphine 4mg IV x2, zofran 4mg IV x1 in the ED

## 2021-04-14 NOTE — ED ADULT NURSE NOTE - NSFALLRSKASSESSDT_ED_ALL_ED
I have personally seen and examined this patient.  I have fully participated in the care of this patient. I have reviewed all pertinent clinical information, including history, physical exam, plan and the Resident’s note and agree except as noted.
15-Apr-2021 02:20

## 2021-04-14 NOTE — ED ADULT NURSE NOTE - NS ED NOTE ABUSE SUSPICION NEGLECT YN
PATIENT INFORMATION  eTofilo Jade       - 1957    CHIEF COMPLAINT  Chief Complaint   Patient presents with   • Abdominal Pain   • Rectal Bleeding   • Diarrhea       HISTORY OF PRESENT ILLNESS  Abdominal Pain   Associated symptoms include diarrhea and hematochezia.   Rectal Bleeding   Associated symptoms include abdominal pain and fatigue.   Diarrhea    Associated symptoms include abdominal pain.     60 yo with 6 weeks of rectal bleeding. He was tried with 2 rounds of anucort without complete resolution. He has history of hemorrhoids.  He has also noticed postprandial diarrhea, within 15 minutes of eating, with associated crampy lower abdnminal pain. Diarrhea is watery/bloody and with mucus.  Abdominal pain is sharp, periumbilical , without radiation.  Weight has been down 20 pounds in February but has been stable since.  Last cls was over 9 years ago.   Labs from pcp normal per patient. No fever or chills. No melena.  Mom with colon polyps.  REVIEW OF SYSTEMS  Review of Systems   Constitutional: Positive for appetite change, fatigue and unexpected weight change.   HENT: Positive for rhinorrhea.    Gastrointestinal: Positive for abdominal pain, anal bleeding, blood in stool, diarrhea and hematochezia.   Psychiatric/Behavioral:        Anxiety   All other systems reviewed and are negative.        ACTIVE PROBLEMS  Patient Active Problem List    Diagnosis   • Nonischemic cardiomyopathy [I42.9]   • Hypertension [I10]   • SEBASTIAN (obstructive sleep apnea) [G47.33]         PAST MEDICAL HISTORY  Past Medical History:   Diagnosis Date   • Colon polyp    • Hypertension    • Nonischemic cardiomyopathy    • SEBASTIAN (obstructive sleep apnea)          SURGICAL HISTORY  Past Surgical History:   Procedure Laterality Date   • COLONOSCOPY     • KNEE SURGERY  2002    both knee         FAMILY HISTORY  Family History   Problem Relation Age of Onset   • Heart disease Mother    • Diabetes Mother    • Stroke Mother    •  "Hypertension Mother    • Colon polyps Mother    • Heart disease Father    • Diabetes Father          SOCIAL HISTORY  Social History     Occupational History   • Not on file.     Social History Main Topics   • Smoking status: Former Smoker   • Smokeless tobacco: Not on file      Comment: caffine 3 cups daily   • Alcohol use Yes      Comment: occ   • Drug use: Not on file   • Sexual activity: Not on file         CURRENT MEDICATIONS    Current Outpatient Prescriptions:   •  aspirin 81 MG tablet, Take  by mouth daily., Disp: , Rfl:   •  carvedilol (COREG) 3.125 MG tablet, TAKE 1 TABLET BY MOUTH 2 TIMES A DAY AS DIRECTED., Disp: 180 tablet, Rfl: 0  •  losartan (COZAAR) 50 MG tablet, TAKE 1/2 TABLET EVERY DAY., Disp: 45 tablet, Rfl: 0  •  Multiple Vitamins-Minerals (CENTRUM SILVER PO), Take 1 tablet by mouth Daily., Disp: , Rfl:   •  pentoxifylline (TRENtal) 400 MG CR tablet, , Disp: , Rfl:   •  spironolactone (ALDACTONE) 25 MG tablet, TAKE 1 TABLET BY MOUTH DAILY., Disp: 90 tablet, Rfl: 0  •  traZODone (DESYREL) 100 MG tablet, Take 100 mg by mouth Every Night., Disp: , Rfl:   •  zolpidem CR (AMBIEN CR) 12.5 MG CR tablet, Take  by mouth., Disp: , Rfl:     ALLERGIES  Review of patient's allergies indicates no known allergies.    VITALS  Vitals:    08/15/17 1410   BP: 136/88   Weight: 160 lb 6.4 oz (72.8 kg)   Height: 68\" (172.7 cm)       LAST RESULTS   Hospital Outpatient Visit on 06/11/2015   Component Date Value Ref Range Status   • Glucose 06/11/2015 101* 65 - 99 mg/dL Final   • BUN 06/11/2015 14  6 - 20 mg/dL Final   • Creatinine 06/11/2015 1.04  0.76 - 1.27 mg/dL Final   • Sodium 06/11/2015 141  136 - 145 mmol/L Final   • Potassium 06/11/2015 4.4  3.5 - 5.2 mmol/L Final   • Chloride 06/11/2015 103  98 - 107 mmol/L Final   • CO2 06/11/2015 29  22 - 29 mmol/L Final   • Calcium 06/11/2015 9.0  8.6 - 10.5 mg/dL Final   • eGFR 06/11/2015 >60  ml/min/1.732 Final    Comment: DF by IF @ 06/11/2015 14:44  GFR Normal            "                 >60  Chronic Kidney Disease          <60  Kidney Failure                         <15       No results found.    PHYSICAL EXAM  Physical Exam   Constitutional: He is oriented to person, place, and time. He appears well-developed and well-nourished. No distress.   HENT:   Head: Normocephalic and atraumatic.   Eyes: EOM are normal. Pupils are equal, round, and reactive to light.   Neck: Neck supple. No tracheal deviation present.   Cardiovascular: Normal rate, regular rhythm, normal heart sounds and intact distal pulses.  Exam reveals no gallop and no friction rub.    No murmur heard.  Pulmonary/Chest: Effort normal and breath sounds normal. No respiratory distress. He has no wheezes. He has no rales. He exhibits no tenderness.   Abdominal: Soft. Bowel sounds are normal. He exhibits no distension. There is no tenderness. There is no rebound and no guarding.   Musculoskeletal: He exhibits no edema.   Lymphadenopathy:     He has no cervical adenopathy.   Neurological: He is alert and oriented to person, place, and time.   Skin: Skin is warm. He is not diaphoretic. No erythema.   Psychiatric: He has a normal mood and affect. His behavior is normal. Judgment and thought content normal.   Nursing note and vitals reviewed.      ASSESSMENT  Diagnoses and all orders for this visit:    Rectal bleeding  -     Stool Culture  -     Clostridium Difficile Toxin  -     Case Request; Standing  -     Case Request    Diarrhea, unspecified type  -     Stool Culture  -     Clostridium Difficile Toxin  -     Case Request; Standing  -     Case Request    Generalized abdominal pain  -     Stool Culture  -     Clostridium Difficile Toxin  -     Case Request; Standing  -     Case Request    Other orders  -     Implement Anesthesia orders day of procedure.; Standing  -     Obtain informed consent; Standing          PLAN  No Follow-up on file.    Get labs from pcp office.      Risks, benefits and alternatives discussed including  but not limited to the complications of bleeding, perforation and sedation related problems.                 No

## 2021-04-15 DIAGNOSIS — E78.5 HYPERLIPIDEMIA, UNSPECIFIED: ICD-10-CM

## 2021-04-15 LAB
A1C WITH ESTIMATED AVERAGE GLUCOSE RESULT: 8.4 % — HIGH (ref 4–5.6)
ALBUMIN SERPL ELPH-MCNC: 2.9 G/DL — LOW (ref 3.3–5)
ALP SERPL-CCNC: 93 U/L — SIGNIFICANT CHANGE UP (ref 40–120)
ALT FLD-CCNC: 82 U/L — HIGH (ref 12–78)
ANION GAP SERPL CALC-SCNC: 8 MMOL/L — SIGNIFICANT CHANGE UP (ref 5–17)
AST SERPL-CCNC: 54 U/L — HIGH (ref 15–37)
BASOPHILS # BLD AUTO: 0.02 K/UL — SIGNIFICANT CHANGE UP (ref 0–0.2)
BASOPHILS NFR BLD AUTO: 0.3 % — SIGNIFICANT CHANGE UP (ref 0–2)
BILIRUB SERPL-MCNC: 0.3 MG/DL — SIGNIFICANT CHANGE UP (ref 0.2–1.2)
BUN SERPL-MCNC: 10 MG/DL — SIGNIFICANT CHANGE UP (ref 7–23)
CALCIUM SERPL-MCNC: 8.5 MG/DL — SIGNIFICANT CHANGE UP (ref 8.5–10.1)
CHLORIDE SERPL-SCNC: 108 MMOL/L — SIGNIFICANT CHANGE UP (ref 96–108)
CHOLEST SERPL-MCNC: 205 MG/DL — HIGH
CO2 SERPL-SCNC: 23 MMOL/L — SIGNIFICANT CHANGE UP (ref 22–31)
COVID-19 SPIKE DOMAIN AB INTERP: POSITIVE
COVID-19 SPIKE DOMAIN ANTIBODY RESULT: >250 U/ML — HIGH
CREAT SERPL-MCNC: 0.72 MG/DL — SIGNIFICANT CHANGE UP (ref 0.5–1.3)
EOSINOPHIL # BLD AUTO: 0.12 K/UL — SIGNIFICANT CHANGE UP (ref 0–0.5)
EOSINOPHIL NFR BLD AUTO: 1.5 % — SIGNIFICANT CHANGE UP (ref 0–6)
ESTIMATED AVERAGE GLUCOSE: 194 MG/DL — HIGH (ref 68–114)
GLUCOSE SERPL-MCNC: 197 MG/DL — HIGH (ref 70–99)
HCT VFR BLD CALC: 37.4 % — SIGNIFICANT CHANGE UP (ref 34.5–45)
HDLC SERPL-MCNC: 35 MG/DL — LOW
HGB BLD-MCNC: 12.1 G/DL — SIGNIFICANT CHANGE UP (ref 11.5–15.5)
IMM GRANULOCYTES NFR BLD AUTO: 0.3 % — SIGNIFICANT CHANGE UP (ref 0–1.5)
LIPID PNL WITH DIRECT LDL SERPL: 104 MG/DL — HIGH
LYMPHOCYTES # BLD AUTO: 2.17 K/UL — SIGNIFICANT CHANGE UP (ref 1–3.3)
LYMPHOCYTES # BLD AUTO: 27.8 % — SIGNIFICANT CHANGE UP (ref 13–44)
MCHC RBC-ENTMCNC: 28.9 PG — SIGNIFICANT CHANGE UP (ref 27–34)
MCHC RBC-ENTMCNC: 32.4 GM/DL — SIGNIFICANT CHANGE UP (ref 32–36)
MCV RBC AUTO: 89.5 FL — SIGNIFICANT CHANGE UP (ref 80–100)
MONOCYTES # BLD AUTO: 0.48 K/UL — SIGNIFICANT CHANGE UP (ref 0–0.9)
MONOCYTES NFR BLD AUTO: 6.1 % — SIGNIFICANT CHANGE UP (ref 2–14)
NEUTROPHILS # BLD AUTO: 5 K/UL — SIGNIFICANT CHANGE UP (ref 1.8–7.4)
NEUTROPHILS NFR BLD AUTO: 64 % — SIGNIFICANT CHANGE UP (ref 43–77)
NON HDL CHOLESTEROL: 170 MG/DL — HIGH
NRBC # BLD: 0 /100 WBCS — SIGNIFICANT CHANGE UP (ref 0–0)
PLATELET # BLD AUTO: 288 K/UL — SIGNIFICANT CHANGE UP (ref 150–400)
POTASSIUM SERPL-MCNC: 3.6 MMOL/L — SIGNIFICANT CHANGE UP (ref 3.5–5.3)
POTASSIUM SERPL-SCNC: 3.6 MMOL/L — SIGNIFICANT CHANGE UP (ref 3.5–5.3)
PROT SERPL-MCNC: 7.5 G/DL — SIGNIFICANT CHANGE UP (ref 6–8.3)
RBC # BLD: 4.18 M/UL — SIGNIFICANT CHANGE UP (ref 3.8–5.2)
RBC # FLD: 12.3 % — SIGNIFICANT CHANGE UP (ref 10.3–14.5)
SARS-COV-2 IGG+IGM SERPL QL IA: >250 U/ML — HIGH
SARS-COV-2 IGG+IGM SERPL QL IA: POSITIVE
SODIUM SERPL-SCNC: 139 MMOL/L — SIGNIFICANT CHANGE UP (ref 135–145)
TRIGL SERPL-MCNC: 330 MG/DL — HIGH
WBC # BLD: 7.81 K/UL — SIGNIFICANT CHANGE UP (ref 3.8–10.5)
WBC # FLD AUTO: 7.81 K/UL — SIGNIFICANT CHANGE UP (ref 3.8–10.5)

## 2021-04-15 RX ORDER — SODIUM CHLORIDE 9 MG/ML
1000 INJECTION, SOLUTION INTRAVENOUS
Refills: 0 | Status: DISCONTINUED | OUTPATIENT
Start: 2021-04-15 | End: 2021-04-16

## 2021-04-15 RX ADMIN — URSODIOL 300 MILLIGRAM(S): 250 TABLET, FILM COATED ORAL at 08:38

## 2021-04-15 RX ADMIN — Medication 1 TABLET(S): at 12:26

## 2021-04-15 RX ADMIN — PREGABALIN 1000 MICROGRAM(S): 225 CAPSULE ORAL at 12:26

## 2021-04-15 RX ADMIN — MORPHINE SULFATE 1 MILLIGRAM(S): 50 CAPSULE, EXTENDED RELEASE ORAL at 09:51

## 2021-04-15 RX ADMIN — MORPHINE SULFATE 2 MILLIGRAM(S): 50 CAPSULE, EXTENDED RELEASE ORAL at 22:36

## 2021-04-15 RX ADMIN — Medication 500 MILLIGRAM(S): at 12:26

## 2021-04-15 RX ADMIN — MORPHINE SULFATE 2 MILLIGRAM(S): 50 CAPSULE, EXTENDED RELEASE ORAL at 18:09

## 2021-04-15 RX ADMIN — MORPHINE SULFATE 2 MILLIGRAM(S): 50 CAPSULE, EXTENDED RELEASE ORAL at 00:30

## 2021-04-15 RX ADMIN — Medication 2 CAPSULE(S): at 17:38

## 2021-04-15 RX ADMIN — GABAPENTIN 300 MILLIGRAM(S): 400 CAPSULE ORAL at 05:16

## 2021-04-15 RX ADMIN — MORPHINE SULFATE 2 MILLIGRAM(S): 50 CAPSULE, EXTENDED RELEASE ORAL at 00:45

## 2021-04-15 RX ADMIN — Medication 2 CAPSULE(S): at 12:27

## 2021-04-15 RX ADMIN — GABAPENTIN 300 MILLIGRAM(S): 400 CAPSULE ORAL at 17:31

## 2021-04-15 RX ADMIN — Medication 2: at 12:25

## 2021-04-15 RX ADMIN — MORPHINE SULFATE 2 MILLIGRAM(S): 50 CAPSULE, EXTENDED RELEASE ORAL at 22:06

## 2021-04-15 RX ADMIN — Medication 2: at 08:44

## 2021-04-15 RX ADMIN — MORPHINE SULFATE 1 MILLIGRAM(S): 50 CAPSULE, EXTENDED RELEASE ORAL at 14:44

## 2021-04-15 RX ADMIN — FAMOTIDINE 40 MILLIGRAM(S): 10 INJECTION INTRAVENOUS at 05:16

## 2021-04-15 RX ADMIN — MORPHINE SULFATE 2 MILLIGRAM(S): 50 CAPSULE, EXTENDED RELEASE ORAL at 05:58

## 2021-04-15 RX ADMIN — ONDANSETRON 4 MILLIGRAM(S): 8 TABLET, FILM COATED ORAL at 13:58

## 2021-04-15 RX ADMIN — FAMOTIDINE 40 MILLIGRAM(S): 10 INJECTION INTRAVENOUS at 17:31

## 2021-04-15 RX ADMIN — MORPHINE SULFATE 2 MILLIGRAM(S): 50 CAPSULE, EXTENDED RELEASE ORAL at 05:01

## 2021-04-15 RX ADMIN — Medication 2 CAPSULE(S): at 08:38

## 2021-04-15 RX ADMIN — Medication 1: at 17:31

## 2021-04-15 RX ADMIN — ENOXAPARIN SODIUM 40 MILLIGRAM(S): 100 INJECTION SUBCUTANEOUS at 12:26

## 2021-04-15 RX ADMIN — POLYETHYLENE GLYCOL 3350 17 GRAM(S): 17 POWDER, FOR SOLUTION ORAL at 21:15

## 2021-04-15 RX ADMIN — LORATADINE 10 MILLIGRAM(S): 10 TABLET ORAL at 12:26

## 2021-04-15 NOTE — PROGRESS NOTE ADULT - SUBJECTIVE AND OBJECTIVE BOX
Patient is a 22 yo female from [facility] living with PMH of autism, chronic pancreatitis, IDDM type 1 (Insulin pump present), and PCOS presents to the ED c/o epigastric pain x 2 days. Patient states that her sx are similar sx to her episodes of chronic pancreatitis. Patient has frequent flare-ups of her pancreatitis, last being a few weeks ago and did not require hospital stay. Patient was scheduled for total pancreatectomy w/ islet cell transplantation into liver this past January, but the procedure was cancelled due to COVID pandemic. Denies ETOH use, no change in medications. Patient states that her pain is well controlled s/p morphine 4mg IV in the ED. Patient notes mild nausea, which is improved s/p zofran. Denies any fevers, chills, CP, SOB, vomiting, diarrhea, constipation, or back pain. Patient received Moderna 2 of 2, last dose ~1 week ago. No known recent COVID exposures.    INTERVAL HPI:   4/15/21: Patient admitted overnight. Patient seen and examined. Patient tired from late admission. States abdominal pain is improving, now a 4-4.5/10 in epigastric region with radiation to back. States pain regimen and heating pads have been helping her pain. Endorses intermittent nausea. No gallstones seen on imaging, patient denies EtOH use, and lipid panel only mildly elevated. NPO with IVF. Endocrine consulted for insulin pump.    OVERNIGHT EVENTS: Patient admitted overnight.     Home Medications:  Allegra 180 mg oral tablet: 1 tab(s) orally once a day (2021 20:20)  Cranberry oral capsule: 8400 milligram(s) orally once a day (2021 20:20)  Creon 36,000 units oral delayed release capsule: 2 cap(s) orally 3 times a day (2021 20:20)  Creon 36,000 units oral delayed release capsule: 1 cap(s) orally once a day, As Needed with snacks (2021 20:20)  famotidine 40 mg oral tablet: 1 tab(s) orally once a day (at bedtime) (2021 20:20)  Flonase 50 mcg/inh nasal spray: 1 spray(s) nasal once a day (2021 20:20)  gabapentin 300 mg oral tablet: 1 tab(s) orally 2 times a day (2021 20:20)  Junel  oral tablet: 1 tab(s) orally once a day (2021 20:20)  Lantus 100 units/mL subcutaneous solution: Dose dependent on Freestlye Candy Sensor-14 (2021 20:20)  MiraLax oral powder for reconstitution: 17 gram(s) orally once a day (2021 20:20)  Multiple Vitamins oral tablet: 1 tab(s) orally once a day (2021 20:20)  NovoLOG 100 units/mL injectable solution: Sliding scale (2021 20:20)  ursodiol 300 mg oral capsule: 1 cap(s) orally once a day (2021 20:20)  Vitamin B12 1000 mcg oral tablet: 1 tab(s) orally once a day (2021 20:20)  Vitamin C 500 mg oral tablet: 1 tab(s) orally once a day (2021 20:20)  Zofran 4 mg oral tablet: 1 tab(s) orally every 6 hours, As Needed (2021 20:20)      MEDICATIONS  (STANDING):  ascorbic acid 500 milliGRAM(s) Oral daily  cyanocobalamin 1000 MICROGram(s) Oral daily  dextrose 40% Gel 15 Gram(s) Oral once  dextrose 5% + sodium chloride 0.9%. 1000 milliLiter(s) (125 mL/Hr) IV Continuous <Continuous>  dextrose 5%. 1000 milliLiter(s) (50 mL/Hr) IV Continuous <Continuous>  dextrose 5%. 1000 milliLiter(s) (100 mL/Hr) IV Continuous <Continuous>  dextrose 50% Injectable 25 Gram(s) IV Push once  dextrose 50% Injectable 12.5 Gram(s) IV Push once  dextrose 50% Injectable 25 Gram(s) IV Push once  enoxaparin Injectable 40 milliGRAM(s) SubCutaneous daily  famotidine    Tablet 40 milliGRAM(s) Oral two times a day  gabapentin 300 milliGRAM(s) Oral two times a day  glucagon  Injectable 1 milliGRAM(s) IntraMuscular once  insulin lispro (ADMELOG) corrective regimen sliding scale   SubCutaneous three times a day before meals  insulin lispro (ADMELOG) corrective regimen sliding scale   SubCutaneous at bedtime  loratadine 10 milliGRAM(s) Oral daily  multivitamin 1 Tablet(s) Oral daily  pancrelipase  (CREON 36,000 Lipase Units) 2 Capsule(s) Oral three times a day with meals  polyethylene glycol 3350 17 Gram(s) Oral daily  ursodiol Capsule 300 milliGRAM(s) Oral <User Schedule>    MEDICATIONS  (PRN):  fluticasone propionate 50 MICROgram(s)/spray Nasal Spray 1 Spray(s) Both Nostrils two times a day PRN nasal congestion  morphine  - Injectable 2 milliGRAM(s) IV Push every 4 hours PRN Moderate Pain (4 - 6)  morphine  - Injectable 3 milliGRAM(s) IV Push every 4 hours PRN Severe Pain (7 - 10)  morphine  - Injectable 1 milliGRAM(s) IV Push every 3 hours PRN Mild Pain (1 - 3)  ondansetron    Tablet 4 milliGRAM(s) Oral every 6 hours PRN Nausea and/or Vomiting  pancrelipase  (CREON 36,000 Lipase Units) 1 Capsule(s) Oral daily PRN w/ snacks      Allergies    No Known Allergies    Intolerances        REVIEW OF SYSTEMS:  CONSTITUTIONAL: No fever, No chills, +Fatigue, No myalgia, No Body ache, No Weakness  EYES: No eye pain,  No visual disturbances, No discharge, NO Redness  ENMT:  No ear pain, No nose bleed, No vertigo; No sinus or throat pain, No Congestion  NECK: No pain, No stiffness  RESPIRATORY: No cough, wheezing, No  hemoptysis, No shortness of breath  CARDIOVASCULAR: No chest pain, palpitations  GASTROINTESTINAL: +Epigastric pain with radiation to the back. +Nausea, No vomiting; No diarrhea or constipation. [  ] BM  GENITOURINARY: No dysuria, No frequency, No urgency, No hematuria, or incontinence  NEUROLOGICAL: No headaches, No dizziness, No numbness, No tingling, No tremors, No weakness  EXT: No Swelling, No Pain, No Edema  SKIN:  [ x ] No itching, burning, rashes, or lesions   MUSCULOSKELETAL: No joint pain or swelling; No muscle pain, +Back pain, No extremity pain  PSYCHIATRIC: No depression, anxiety, mood swings or difficulty sleeping at night  PAIN SCALE: [  ] None  [ x ] Other-4/10  ROS Unable to obtain due to - [  ] Dementia  [  ] Lethargy  [  ] Sedated [  ] non verbal  REST OF REVIEW Of SYSTEM - [ x ] Normal     Vital Signs Last 24 Hrs  T(C): 36.7 (15 Apr 2021 05:01), Max: 36.9 (15 Apr 2021 00:28)  T(F): 98.1 (15 Apr 2021 05:), Max: 98.5 (15 Apr 2021 00:28)  HR: 95 (15 Apr 2021 05:) (93 - 110)  BP: 119/74 (15 Apr 2021 05:) (108/70 - 136/85)  BP(mean): --  RR: 18 (15 Apr 2021 05:) (16 - 18)  SpO2: 93% (15 Apr 2021 05:) (93% - 97%)  Finger Stick          PHYSICAL EXAM:  GENERAL:  [x  ] NAD , [ x ] well appearing, [  ] Agitated, [x  ] Drowsy,  [  ] Lethargy, [  ] confused   HEAD:  [ x ] Normal, [  ] Other  EYES:  [ x ] EOMI, [x  ] PERRLA, [x  ] conjunctiva and sclera clear normal, [  ] Other,  [  ] Pallor,[  ] Discharge  ENMT:  [x  ] Normal, [  x] Moist mucous membranes, [  ] Good dentition, [  ] No Thrush  NECK:  [ x ] Supple, [  ] No JVD, [  ] Normal thyroid, [  ] Lymphadenopathy [  ] Other  CHEST/LUNG:  [x  ] Clear to auscultation bilaterally, [  ] Breath Sounds equal OR Decrease,  [x  ] No rales, [ x ] No rhonchi  [x  ]  No wheezing  HEART:  [x  ] Regular rate and rhythm, [  ] tachycardia, [  ] Bradycardia,  [  ] irregular  [  ] No murmurs, No rubs, No gallops, [  ] PPM in place (Mfr:  )  ABDOMEN:  [ x ] Soft, [ x ] Tender in epigastric region, [x  ] Nondistended, [  ] No mass, [ x ] Bowel sounds present, [ x ] obese  NERVOUS SYSTEM:  [x  ] Alert & Oriented X3, [ x ] Nonfocal  [  ] Confusion  [  ] Encephalopathic [  ] Sedated [  ] Unable to assess, [  ] Other-  EXTREMITIES: [x  ] 2+ Peripheral Pulses, No clubbing, No cyanosis,  [  ] edema B/L lower EXT. [  ] PVD stasis skin changes B/L Lower EXT  LYMPH: No lymphadenopathy noted  SKIN:  [x  ] No rashes or lesions, [  ] Pressure Ulcers, [  ] ecchymosis, [  ] Skin Tears, [  ] Other    DIET: NPO    LABS:                        12.1   7.81  )-----------( 288      ( 15 Apr 2021 05:32 )             37.4     15 Apr 2021 05:32    139    |  108    |  10     ----------------------------<  197    3.6     |  23     |  0.72     Ca    8.5        15 Apr 2021 05:32    TPro  7.5    /  Alb  2.9    /  TBili  0.3    /  DBili  x      /  AST  54     /  ALT  82     /  AlkPhos  93     15 Apr 2021 05:32      Urinalysis Basic - ( 2021 18:52 )    Color: Yellow / Appearance: Slightly Turbid / S.015 / pH: x  Gluc: x / Ketone: Moderate  / Bili: Negative / Urobili: Negative   Blood: x / Protein: 15 / Nitrite: Negative   Leuk Esterase: Moderate / RBC: x / WBC 11-25   Sq Epi: x / Non Sq Epi: Few / Bacteria: Moderate                           Anemia Panel:      Thyroid Panel:        Lipase, Serum: 274 U/L (21 @ 16:23)          RADIOLOGY & ADDITIONAL TESTS:  < from: CT Abdomen and Pelvis w/ IV Cont (21 @ 18:19) >  IMPRESSION:  Evidence of acute pancreatitis. Underlying chronic pancreatitis is noted.    Hepatic steatosis.    < end of copied text >        HEALTH ISSUES - PROBLEM Dx:  Acute pancreatitis    Diabetes mellitus  Insulin dependent    Autism spectrum disorder    Need for prophylactic measure            Consultant(s) Notes Reviewed:  [x  ] YES     Care Discussed with [X] Consultants  [x  ] Patient  [ x ] Family  [  ]   [  ] Social Service  [  ] RN, [  ] Physical Therapy  DVT PPX: [x  ] Lovenox, [  ] S C Heparin, [  ] Coumadin, [  ] Xarelto, [  ] Eliquis, [  ] Pradaxa, [  ] IV Heparin drip, [  ] SCD [  ] Contraindication 2 to GI Bleed,[  ] Ambulation  Advanced directive: [ x ] None, [  ] DNR/DNI Patient is a 20 yo female from assisted with PMH of autism, chronic pancreatitis, IDDM type 1 (Insulin pump present), and PCOS presents to the ED c/o epigastric pain x 2 days. Patient states that her sx are similar sx to her episodes of chronic pancreatitis. Patient has frequent flare-ups of her pancreatitis, last being a few weeks ago and did not require hospital stay. Patient was scheduled for total pancreatectomy w/ islet cell transplantation into liver this past January, but the procedure was cancelled due to COVID pandemic. Denies ETOH use, no change in medications. Patient states that her pain is well controlled s/p morphine 4mg IV in the ED. Patient notes mild nausea, which is improved s/p zofran. Denies any fevers, chills, CP, SOB, vomiting, diarrhea, constipation, or back pain. Patient received Moderna 2 of 2, last dose ~1 week ago. No known recent COVID exposures.    INTERVAL HPI:   4/15/21: Patient admitted overnight. Patient seen and examined. Patient tired from late admission. States abdominal pain is improving, now a 4-4.5/10 in epigastric region with radiation to back. States pain regimen and heating pads have been helping her pain. Endorses intermittent nausea. No gallstones seen on imaging, patient denies EtOH use, and lipid panel only mildly elevated. NPO with IVF. Endocrine consulted - Dr C perlman ,for insulin pump.    OVERNIGHT EVENTS: Patient admitted overnight.     Home Medications:  Allegra 180 mg oral tablet: 1 tab(s) orally once a day (2021 20:20)  Cranberry oral capsule: 8400 milligram(s) orally once a day (2021 20:20)  Creon 36,000 units oral delayed release capsule: 2 cap(s) orally 3 times a day (2021 20:20)  Creon 36,000 units oral delayed release capsule: 1 cap(s) orally once a day, As Needed with snacks (2021 20:20)  famotidine 40 mg oral tablet: 1 tab(s) orally once a day (at bedtime) (2021 20:20)  Flonase 50 mcg/inh nasal spray: 1 spray(s) nasal once a day (2021 20:20)  gabapentin 300 mg oral tablet: 1 tab(s) orally 2 times a day (2021 20:20)  Junel  oral tablet: 1 tab(s) orally once a day (2021 20:20)  Lantus 100 units/mL subcutaneous solution: Dose dependent on Freestlye Candy Sensor-14 (2021 20:20)  MiraLax oral powder for reconstitution: 17 gram(s) orally once a day (2021 20:20)  Multiple Vitamins oral tablet: 1 tab(s) orally once a day (2021 20:20)  NovoLOG 100 units/mL injectable solution: Sliding scale (2021 20:20)  ursodiol 300 mg oral capsule: 1 cap(s) orally once a day (2021 20:20)  Vitamin B12 1000 mcg oral tablet: 1 tab(s) orally once a day (2021 20:20)  Vitamin C 500 mg oral tablet: 1 tab(s) orally once a day (2021 20:20)  Zofran 4 mg oral tablet: 1 tab(s) orally every 6 hours, As Needed (2021 20:20)      MEDICATIONS  (STANDING):  ascorbic acid 500 milliGRAM(s) Oral daily  cyanocobalamin 1000 MICROGram(s) Oral daily  dextrose 40% Gel 15 Gram(s) Oral once  dextrose 5% + sodium chloride 0.9%. 1000 milliLiter(s) (125 mL/Hr) IV Continuous <Continuous>  dextrose 5%. 1000 milliLiter(s) (50 mL/Hr) IV Continuous <Continuous>  dextrose 5%. 1000 milliLiter(s) (100 mL/Hr) IV Continuous <Continuous>  dextrose 50% Injectable 25 Gram(s) IV Push once  dextrose 50% Injectable 12.5 Gram(s) IV Push once  dextrose 50% Injectable 25 Gram(s) IV Push once  enoxaparin Injectable 40 milliGRAM(s) SubCutaneous daily  famotidine    Tablet 40 milliGRAM(s) Oral two times a day  gabapentin 300 milliGRAM(s) Oral two times a day  glucagon  Injectable 1 milliGRAM(s) IntraMuscular once  insulin lispro (ADMELOG) corrective regimen sliding scale   SubCutaneous three times a day before meals  insulin lispro (ADMELOG) corrective regimen sliding scale   SubCutaneous at bedtime  loratadine 10 milliGRAM(s) Oral daily  multivitamin 1 Tablet(s) Oral daily  pancrelipase  (CREON 36,000 Lipase Units) 2 Capsule(s) Oral three times a day with meals  polyethylene glycol 3350 17 Gram(s) Oral daily  ursodiol Capsule 300 milliGRAM(s) Oral <User Schedule>    MEDICATIONS  (PRN):  fluticasone propionate 50 MICROgram(s)/spray Nasal Spray 1 Spray(s) Both Nostrils two times a day PRN nasal congestion  morphine  - Injectable 2 milliGRAM(s) IV Push every 4 hours PRN Moderate Pain (4 - 6)  morphine  - Injectable 3 milliGRAM(s) IV Push every 4 hours PRN Severe Pain (7 - 10)  morphine  - Injectable 1 milliGRAM(s) IV Push every 3 hours PRN Mild Pain (1 - 3)  ondansetron    Tablet 4 milliGRAM(s) Oral every 6 hours PRN Nausea and/or Vomiting  pancrelipase  (CREON 36,000 Lipase Units) 1 Capsule(s) Oral daily PRN w/ snacks      Allergies    No Known Allergies    Intolerances        REVIEW OF SYSTEMS: i am OK  CONSTITUTIONAL: No fever, No chills, +Fatigue, No myalgia, No Body ache, No Weakness  EYES: No eye pain,  No visual disturbances, No discharge, NO Redness  ENMT:  No ear pain, No nose bleed, No vertigo; No sinus or throat pain, No Congestion  NECK: No pain, No stiffness  RESPIRATORY: No cough, wheezing, No  hemoptysis, No shortness of breath  CARDIOVASCULAR: No chest pain, palpitations  GASTROINTESTINAL: +Epigastric pain with radiation to the back. +Nausea, No vomiting; No diarrhea or constipation. [  ] BM  GENITOURINARY: No dysuria, No frequency, No urgency, No hematuria, or incontinence  NEUROLOGICAL: No headaches, No dizziness, No numbness, No tingling, No tremors, No weakness  EXT: No Swelling, No Pain, No Edema  SKIN:  [ x ] No itching, burning, rashes, or lesions   MUSCULOSKELETAL: No joint pain or swelling; No muscle pain, +Back pain, No extremity pain  PSYCHIATRIC: No depression, anxiety, mood swings or difficulty sleeping at night  PAIN SCALE: [  ] None  [ x ] Other-4/10  ROS Unable to obtain due to - [  ] Dementia  [  ] Lethargy  [  ] Sedated [  ] non verbal  REST OF REVIEW Of SYSTEM - [ x ] Normal     Vital Signs Last 24 Hrs  T(C): 36.7 (15 Apr 2021 05:01), Max: 36.9 (15 Apr 2021 00:28)  T(F): 98.1 (15 Apr 2021 05:01), Max: 98.5 (15 Apr 2021 00:28)  HR: 95 (15 Apr 2021 05:01) (93 - 110)  BP: 119/74 (15 Apr 2021 05:) (108/70 - 136/85)  BP(mean): --  RR: 18 (15 Apr 2021 05:01) (16 - 18)  SpO2: 93% (15 Apr 2021 05:01) (93% - 97%)  Finger Stick          PHYSICAL EXAM:  GENERAL:  [x  ] NAD , [ x ] well appearing, [  ] Agitated, [x  ] Drowsy,  [  ] Lethargy, [  ] confused   HEAD:  [ x ] Normal, [  ] Other  EYES:  [ x ] EOMI, [x  ] PERRLA, [x  ] conjunctiva and sclera clear normal, [  ] Other,  [  ] Pallor,[  ] Discharge  ENMT:  [x  ] Normal, [  x] Moist mucous membranes, [  ] Good dentition, [  ] No Thrush  NECK:  [ x ] Supple, [x  ] No JVD, [ x ] Normal thyroid, [  ] Lymphadenopathy [  ] Other  CHEST/LUNG:  [x  ] Clear to auscultation bilaterally, [ x ] Breath Sounds equal OR Decrease,  [x  ] No rales, [ x ] No rhonchi  [x  ]  No wheezing  HEART:  [x  ] Regular rate and rhythm, [  ] tachycardia, [  ] Bradycardia,  [  ] irregular  [ x ] No murmurs, No rubs, No gallops, [  ] PPM in place (Mfr:  )  ABDOMEN:  [ x ] Soft, [ x ] Tender in epigastric region NO R/R/G , [x  ] Nondistended, [ x ] No mass, [ x ] Bowel sounds present, [ x ] obese  NERVOUS SYSTEM:  [x  ] Alert & Oriented X3, [ x ] Nonfocal  [  ] Confusion  [  ] Encephalopathic [  ] Sedated [  ] Unable to assess, [  ] Other-  EXTREMITIES: [x  ] 2+ Peripheral Pulses, No clubbing, No cyanosis,  [  ] edema B/L lower EXT. [  ] PVD stasis skin changes B/L Lower EXT  LYMPH: No lymphadenopathy noted  SKIN:  [x  ] No rashes or lesions, [  ] Pressure Ulcers, [  ] ecchymosis, [  ] Skin Tears, [  ] Other    DIET: NPO    LABS:                        12.1   7.81  )-----------( 288      ( 15 Apr 2021 05:32 )             37.4     15 Apr 2021 05:32    139    |  108    |  10     ----------------------------<  197    3.6     |  23     |  0.72     Ca    8.5        15 Apr 2021 05:32    TPro  7.5    /  Alb  2.9    /  TBili  0.3    /  DBili  x      /  AST  54     /  ALT  82     /  AlkPhos  93     15 Apr 2021 05:32      Urinalysis Basic - ( 2021 18:52 )    Color: Yellow / Appearance: Slightly Turbid / S.015 / pH: x  Gluc: x / Ketone: Moderate  / Bili: Negative / Urobili: Negative   Blood: x / Protein: 15 / Nitrite: Negative   Leuk Esterase: Moderate / RBC: x / WBC 11-25   Sq Epi: x / Non Sq Epi: Few / Bacteria: Moderate      Lipase, Serum: 274 U/L (21 @ 16:23)      RADIOLOGY & ADDITIONAL TESTS:  < from: CT Abdomen and Pelvis w/ IV Cont (21 @ 18:19) >  IMPRESSION:  Evidence of acute pancreatitis. Underlying chronic pancreatitis is noted.    Hepatic steatosis.    < end of copied text >        HEALTH ISSUES - PROBLEM Dx:  Acute pancreatitis    Diabetes mellitus  Insulin dependent    Autism spectrum disorder    Need for prophylactic measure      Consultant(s) Notes Reviewed:  [x  ] YES     Care Discussed with [X] Consultants  [x  ] Patient  [ x ] Family  [  ]   [  ] Social Service  [ x ] RN, [  ] Physical Therapy  DVT PPX: [x  ] Lovenox, [  ] S C Heparin, [  ] Coumadin, [  ] Xarelto, [  ] Eliquis, [  ] Pradaxa, [  ] IV Heparin drip, [  ] SCD [  ] Contraindication 2 to GI Bleed,[  ] Ambulation  Advanced directive: [ x ] None, [  ] DNR/DNI

## 2021-04-15 NOTE — PROGRESS NOTE ADULT - ASSESSMENT
Patient is a 22 yo female from [facility] with PMH of autism, chronic pancreatitis, IDDM type 1 presents to the ED c/o epigastric pain x past several days admitted for acute pancreatitis

## 2021-04-15 NOTE — CONSULT NOTE ADULT - NSICDXPROBLEMREC1_GEN_ALL_CORE_FT
type 3c diabetes  patient's mother states to have group home bring omnipod pdm to hospital as well as pods  cont basal setting through omnipod. if omnipod pdm/pods not provided, to initiate once daily lantus  cont admelog scale coverage q6hrs while npo  goal bg 100-180 in hosp setting

## 2021-04-15 NOTE — PROGRESS NOTE ADULT - NSICDXPROBLEMPLAN1_GEN_ALL_CORE_FT
Patient w/ hx of recurrent chronic pancreatitis p/w epigastric pain, found to have acute pancreatitis w/ underlying chronic pancreatitis   ?Etiology   on CT a/p shows Ac Pancreatitis , No GB stones  -Does not meet SIRS criteria  -Denies ETOH use, no change in medications, lipid panel with only mild elevation  -Continue with D5/NS   -Pain control w/ Morphine 1 mg IV for Mild / morphine 2mg IV q4 for moderate, 3mg q4 for severe pain  -Zofran PRN nausea  -NPO except meds, On Pancreatic enzyme supplement  -GI-Dr Scarlet shea Patient w/ hx of recurrent chronic pancreatitis p/w epigastric pain, found to have acute pancreatitis w/ underlying chronic pancreatitis   ?Etiology   on CT a/p shows Ac Pancreatitis , No GB stones  -Does not meet SIRS criteria  -Denies ETOH use, no change in medications, lipid panel with only mild elevation  -NPO ,Continue with D5/NS   -Pain control w/ Morphine 1 mg IV for Mild / morphine 2mg IV q4 for moderate, 3mg q4 for severe pain  -Zofran PRN nausea  -NPO except meds, On Pancreatic enzyme supplement  -GI-Dr Ladd  D/W-patient with recent eus at [facility] - yoav kaminski  being eval for pancreatectomy and islet cell transplant at [facility]

## 2021-04-15 NOTE — PROGRESS NOTE ADULT - ATTENDING COMMENTS
Patient is a 22 yo female from [facility] with PMH of autism, chronic pancreatitis, IDDM type 1 presents to the ED c/o epigastric pain x past several days admitted for acute pancreatitis.  Pt seen, examined, case & care plan d/w pt, residents at detail.  D/W GI- DR Ladd , continue current care  IV Fluids, NPO Patient is a 20 yo female from shelter with PMH of autism, chronic pancreatitis, IDDM type 1 presents to the ED c/o epigastric pain x past several days admitted for acute pancreatitis.  Pt seen, examined, case & care plan d/w pt, residents at detail.  D/W GI- DR Ladd , continue current care  IV Fluids, NPO  D/W MOM & Father Jennifer/Manjit at detail

## 2021-04-15 NOTE — PROGRESS NOTE ADULT - NSICDXPROBLEMPLAN2_GEN_ALL_CORE_FT
Hx of IDDM1, insulin pump present  -Dose of lantus not known and is autoregulated as per patient  -Dr. Perlman consulted: mother states to have group home bring omnipod pdm to hospital as well as pods cont basal setting. If not, initiate once daily lantus.   -Continue D5/NS   -Low dose ISS, accuchecks q6/hs while NPO, Hypoglycemia protocal Hx of IDDM1, insulin pump present  -Dose of lantus not known and is autoregulated as per patient  -Dr. Perlman consulted: mother states to have group home bring omnipod pdm to hospital as well as pods cont basal setting. If not, initiate once daily lantus.   -Continue D5/NS @ 125 ml/hr  -Low dose ISS, accuchecks q6/hs while NPO, Hypoglycemia protocal

## 2021-04-16 DIAGNOSIS — R74.01 ELEVATION OF LEVELS OF LIVER TRANSAMINASE LEVELS: ICD-10-CM

## 2021-04-16 LAB
ALBUMIN SERPL ELPH-MCNC: 2.9 G/DL — LOW (ref 3.3–5)
ALP SERPL-CCNC: 89 U/L — SIGNIFICANT CHANGE UP (ref 40–120)
ALT FLD-CCNC: 143 U/L — HIGH (ref 12–78)
AMYLASE P1 CFR SERPL: 22 U/L — LOW (ref 25–125)
ANION GAP SERPL CALC-SCNC: 7 MMOL/L — SIGNIFICANT CHANGE UP (ref 5–17)
AST SERPL-CCNC: 223 U/L — HIGH (ref 15–37)
BASOPHILS # BLD AUTO: 0 K/UL — SIGNIFICANT CHANGE UP (ref 0–0.2)
BASOPHILS NFR BLD AUTO: 0 % — SIGNIFICANT CHANGE UP (ref 0–2)
BILIRUB SERPL-MCNC: 0.3 MG/DL — SIGNIFICANT CHANGE UP (ref 0.2–1.2)
BUN SERPL-MCNC: 5 MG/DL — LOW (ref 7–23)
CALCIUM SERPL-MCNC: 8.6 MG/DL — SIGNIFICANT CHANGE UP (ref 8.5–10.1)
CHLORIDE SERPL-SCNC: 107 MMOL/L — SIGNIFICANT CHANGE UP (ref 96–108)
CO2 SERPL-SCNC: 24 MMOL/L — SIGNIFICANT CHANGE UP (ref 22–31)
CREAT SERPL-MCNC: 0.68 MG/DL — SIGNIFICANT CHANGE UP (ref 0.5–1.3)
EOSINOPHIL # BLD AUTO: 0.22 K/UL — SIGNIFICANT CHANGE UP (ref 0–0.5)
EOSINOPHIL NFR BLD AUTO: 5 % — SIGNIFICANT CHANGE UP (ref 0–6)
GLUCOSE SERPL-MCNC: 222 MG/DL — HIGH (ref 70–99)
HCT VFR BLD CALC: 37.6 % — SIGNIFICANT CHANGE UP (ref 34.5–45)
HGB BLD-MCNC: 12.3 G/DL — SIGNIFICANT CHANGE UP (ref 11.5–15.5)
LIDOCAIN IGE QN: 151 U/L — SIGNIFICANT CHANGE UP (ref 73–393)
LYMPHOCYTES # BLD AUTO: 1.47 K/UL — SIGNIFICANT CHANGE UP (ref 1–3.3)
LYMPHOCYTES # BLD AUTO: 33 % — SIGNIFICANT CHANGE UP (ref 13–44)
MCHC RBC-ENTMCNC: 29.3 PG — SIGNIFICANT CHANGE UP (ref 27–34)
MCHC RBC-ENTMCNC: 32.7 GM/DL — SIGNIFICANT CHANGE UP (ref 32–36)
MCV RBC AUTO: 89.5 FL — SIGNIFICANT CHANGE UP (ref 80–100)
MONOCYTES # BLD AUTO: 0.36 K/UL — SIGNIFICANT CHANGE UP (ref 0–0.9)
MONOCYTES NFR BLD AUTO: 8 % — SIGNIFICANT CHANGE UP (ref 2–14)
NEUTROPHILS # BLD AUTO: 2.41 K/UL — SIGNIFICANT CHANGE UP (ref 1.8–7.4)
NEUTROPHILS NFR BLD AUTO: 54 % — SIGNIFICANT CHANGE UP (ref 43–77)
NRBC # BLD: SIGNIFICANT CHANGE UP /100 WBCS (ref 0–0)
PLATELET # BLD AUTO: 266 K/UL — SIGNIFICANT CHANGE UP (ref 150–400)
POTASSIUM SERPL-MCNC: 3.6 MMOL/L — SIGNIFICANT CHANGE UP (ref 3.5–5.3)
POTASSIUM SERPL-SCNC: 3.6 MMOL/L — SIGNIFICANT CHANGE UP (ref 3.5–5.3)
PROT SERPL-MCNC: 7.5 G/DL — SIGNIFICANT CHANGE UP (ref 6–8.3)
RBC # BLD: 4.2 M/UL — SIGNIFICANT CHANGE UP (ref 3.8–5.2)
RBC # FLD: 12.4 % — SIGNIFICANT CHANGE UP (ref 10.3–14.5)
SODIUM SERPL-SCNC: 138 MMOL/L — SIGNIFICANT CHANGE UP (ref 135–145)
WBC # BLD: 4.46 K/UL — SIGNIFICANT CHANGE UP (ref 3.8–10.5)
WBC # FLD AUTO: 4.46 K/UL — SIGNIFICANT CHANGE UP (ref 3.8–10.5)

## 2021-04-16 RX ORDER — INSULIN GLARGINE 100 [IU]/ML
8 INJECTION, SOLUTION SUBCUTANEOUS EVERY MORNING
Refills: 0 | Status: DISCONTINUED | OUTPATIENT
Start: 2021-04-16 | End: 2021-04-17

## 2021-04-16 RX ORDER — INSULIN LISPRO 100/ML
VIAL (ML) SUBCUTANEOUS EVERY 6 HOURS
Refills: 0 | Status: DISCONTINUED | OUTPATIENT
Start: 2021-04-16 | End: 2021-04-19

## 2021-04-16 RX ORDER — SODIUM CHLORIDE 9 MG/ML
1000 INJECTION, SOLUTION INTRAVENOUS
Refills: 0 | Status: DISCONTINUED | OUTPATIENT
Start: 2021-04-16 | End: 2021-04-21

## 2021-04-16 RX ADMIN — MORPHINE SULFATE 3 MILLIGRAM(S): 50 CAPSULE, EXTENDED RELEASE ORAL at 13:27

## 2021-04-16 RX ADMIN — MORPHINE SULFATE 2 MILLIGRAM(S): 50 CAPSULE, EXTENDED RELEASE ORAL at 16:40

## 2021-04-16 RX ADMIN — ONDANSETRON 4 MILLIGRAM(S): 8 TABLET, FILM COATED ORAL at 11:02

## 2021-04-16 RX ADMIN — Medication 2: at 11:11

## 2021-04-16 RX ADMIN — ONDANSETRON 4 MILLIGRAM(S): 8 TABLET, FILM COATED ORAL at 20:55

## 2021-04-16 RX ADMIN — FAMOTIDINE 40 MILLIGRAM(S): 10 INJECTION INTRAVENOUS at 05:58

## 2021-04-16 RX ADMIN — MORPHINE SULFATE 2 MILLIGRAM(S): 50 CAPSULE, EXTENDED RELEASE ORAL at 06:10

## 2021-04-16 RX ADMIN — SODIUM CHLORIDE 125 MILLILITER(S): 9 INJECTION, SOLUTION INTRAVENOUS at 18:17

## 2021-04-16 RX ADMIN — MORPHINE SULFATE 2 MILLIGRAM(S): 50 CAPSULE, EXTENDED RELEASE ORAL at 21:00

## 2021-04-16 RX ADMIN — Medication 1: at 18:18

## 2021-04-16 RX ADMIN — MORPHINE SULFATE 2 MILLIGRAM(S): 50 CAPSULE, EXTENDED RELEASE ORAL at 02:30

## 2021-04-16 RX ADMIN — MORPHINE SULFATE 2 MILLIGRAM(S): 50 CAPSULE, EXTENDED RELEASE ORAL at 02:03

## 2021-04-16 RX ADMIN — LORATADINE 10 MILLIGRAM(S): 10 TABLET ORAL at 11:02

## 2021-04-16 RX ADMIN — INSULIN GLARGINE 8 UNIT(S): 100 INJECTION, SOLUTION SUBCUTANEOUS at 08:54

## 2021-04-16 RX ADMIN — ENOXAPARIN SODIUM 40 MILLIGRAM(S): 100 INJECTION SUBCUTANEOUS at 11:03

## 2021-04-16 RX ADMIN — Medication 500 MILLIGRAM(S): at 11:02

## 2021-04-16 RX ADMIN — MORPHINE SULFATE 2 MILLIGRAM(S): 50 CAPSULE, EXTENDED RELEASE ORAL at 11:03

## 2021-04-16 RX ADMIN — GABAPENTIN 300 MILLIGRAM(S): 400 CAPSULE ORAL at 17:47

## 2021-04-16 RX ADMIN — PREGABALIN 1000 MICROGRAM(S): 225 CAPSULE ORAL at 11:02

## 2021-04-16 RX ADMIN — URSODIOL 300 MILLIGRAM(S): 250 TABLET, FILM COATED ORAL at 05:58

## 2021-04-16 RX ADMIN — FAMOTIDINE 40 MILLIGRAM(S): 10 INJECTION INTRAVENOUS at 17:47

## 2021-04-16 RX ADMIN — Medication 2: at 05:57

## 2021-04-16 RX ADMIN — Medication 1 TABLET(S): at 11:03

## 2021-04-16 RX ADMIN — MORPHINE SULFATE 2 MILLIGRAM(S): 50 CAPSULE, EXTENDED RELEASE ORAL at 21:30

## 2021-04-16 RX ADMIN — GABAPENTIN 300 MILLIGRAM(S): 400 CAPSULE ORAL at 05:58

## 2021-04-16 RX ADMIN — MORPHINE SULFATE 2 MILLIGRAM(S): 50 CAPSULE, EXTENDED RELEASE ORAL at 06:30

## 2021-04-16 NOTE — PROGRESS NOTE ADULT - ASSESSMENT
Patient is a 22 yo female from [facility] with PMH of autism, chronic pancreatitis, IDDM type 1 presents to the ED c/o epigastric pain x past several days admitted for acute pancreatitis

## 2021-04-16 NOTE — PROGRESS NOTE ADULT - SUBJECTIVE AND OBJECTIVE BOX
[facility]  [place] [address]  [phone]      INTERVAL HPI/OVERNIGHT EVENTS:    patient s/e  no significant improvement     MEDICATIONS  (STANDING):  ascorbic acid 500 milliGRAM(s) Oral daily  cyanocobalamin 1000 MICROGram(s) Oral daily  dextrose 40% Gel 15 Gram(s) Oral once  dextrose 5% + sodium chloride 0.9%. 1000 milliLiter(s) (125 mL/Hr) IV Continuous <Continuous>  dextrose 5%. 1000 milliLiter(s) (50 mL/Hr) IV Continuous <Continuous>  dextrose 5%. 1000 milliLiter(s) (100 mL/Hr) IV Continuous <Continuous>  dextrose 50% Injectable 25 Gram(s) IV Push once  dextrose 50% Injectable 12.5 Gram(s) IV Push once  dextrose 50% Injectable 25 Gram(s) IV Push once  enoxaparin Injectable 40 milliGRAM(s) SubCutaneous daily  famotidine    Tablet 40 milliGRAM(s) Oral two times a day  gabapentin 300 milliGRAM(s) Oral two times a day  glucagon  Injectable 1 milliGRAM(s) IntraMuscular once  insulin glargine Injectable (LANTUS) 8 Unit(s) SubCutaneous every morning  insulin lispro (ADMELOG) corrective regimen sliding scale   SubCutaneous every 6 hours  loratadine 10 milliGRAM(s) Oral daily  multivitamin 1 Tablet(s) Oral daily  pancrelipase  (CREON 36,000 Lipase Units) 2 Capsule(s) Oral three times a day with meals  polyethylene glycol 3350 17 Gram(s) Oral daily  ursodiol Capsule 300 milliGRAM(s) Oral <User Schedule>    MEDICATIONS  (PRN):  fluticasone propionate 50 MICROgram(s)/spray Nasal Spray 1 Spray(s) Both Nostrils two times a day PRN nasal congestion  morphine  - Injectable 2 milliGRAM(s) IV Push every 4 hours PRN Moderate Pain (4 - 6)  morphine  - Injectable 3 milliGRAM(s) IV Push every 4 hours PRN Severe Pain (7 - 10)  morphine  - Injectable 1 milliGRAM(s) IV Push every 3 hours PRN Mild Pain (1 - 3)  ondansetron    Tablet 4 milliGRAM(s) Oral every 6 hours PRN Nausea and/or Vomiting  pancrelipase  (CREON 36,000 Lipase Units) 1 Capsule(s) Oral daily PRN w/ snacks      Allergies    No Known Allergies    Intolerances        ROS:   General:  No wt loss, fevers, chills, night sweats, fatigue,   Eyes:  Good vision, no reported pain  ENT:  No sore throat, pain, runny nose, dysphagia  CV:  No pain, palpitations, hypo/hypertension  Resp:  No dyspnea, cough, tachypnea, wheezing  GI:  + pain, No nausea, No vomiting, No diarrhea, No constipation, No weight loss, No fever, No pruritis, No rectal bleeding, No tarry stools, No dysphagia,  :  No pain, bleeding, incontinence, nocturia  Muscle:  No pain, weakness  Neuro:  No weakness, tingling, memory problems  Psych:  No fatigue, insomnia, mood problems, depression  Endocrine:  No polyuria, polydipsia, cold/heat intolerance  Heme:  No petechiae, ecchymosis, easy bruisability  Skin:  No rash, tattoos, scars, edema      PHYSICAL EXAM:   Vital Signs:  Vital Signs Last 24 Hrs  T(C): 36.8 (2021 05:31), Max: 36.8 (15 Apr 2021 13:00)  T(F): 98.3 (2021 05:31), Max: 98.3 (2021 05:31)  HR: 82 (2021 05:31) (82 - 95)  BP: 123/77 (2021 05:31) (110/68 - 123/77)  BP(mean): --  RR: 18 (2021 05:31) (18 - 18)  SpO2: 95% (2021 05:31) (95% - 96%)  Daily     Daily     GENERAL:  Appears stated age,   HEENT:  NC/AT,    CHEST:  Full & symmetric excursion,   HEART:  Regular rhythm,  ABDOMEN:  Soft, non-tender, non-distended,  EXTEREMITIES:  no cyanosis  SKIN:  No rash  NEURO:  Alert,       LABS:                        12.3   4.46  )-----------( 266      ( 2021 06:46 )             37.6     04-16    138  |  107  |  5<L>  ----------------------------<  222<H>  3.6   |  24  |  0.68    Ca    8.6      2021 06:46    TPro  7.5  /  Alb  2.9<L>  /  TBili  0.3  /  DBili  x   /  AST  223<H>  /  ALT  143<H>  /  AlkPhos  89        Urinalysis Basic - ( 2021 18:52 )    Color: Yellow / Appearance: Slightly Turbid / S.015 / pH: x  Gluc: x / Ketone: Moderate  / Bili: Negative / Urobili: Negative   Blood: x / Protein: 15 / Nitrite: Negative   Leuk Esterase: Moderate / RBC: x / WBC 11-25   Sq Epi: x / Non Sq Epi: Few / Bacteria: Moderate        RADIOLOGY & ADDITIONAL TESTS:

## 2021-04-16 NOTE — PROGRESS NOTE ADULT - NSICDXPROBLEMPLAN1_GEN_ALL_CORE_FT
Patient w/ hx of recurrent chronic pancreatitis p/w epigastric pain, found to have acute pancreatitis w/ underlying chronic pancreatitis   ?Etiology   on CT a/p shows Ac Pancreatitis , No GB stones  -Does not meet SIRS criteria  -Denies ETOH use, no change in medications, lipid panel with only mild elevation  -Continue with D5/NS   -Pain control w/ Morphine 1 mg IV for Mild / morphine 2mg IV q4 for moderate, 3mg q4 for severe pain  -Zofran PRN nausea  -NPO except meds, On Pancreatic enzyme supplement  -GI consulted, recs appreciated

## 2021-04-16 NOTE — PROGRESS NOTE ADULT - ATTENDING COMMENTS
Patient is a 20 yo female from [place] with PMH of autism, chronic pancreatitis, IDDM type 1 presents to the ED c/o epigastric pain x past several days admitted for acute pancreatitis.  Pt seen, examined, case & care plan d/w pt, residents at detail.  D/W GI- DR Ladd , continue current care, pt can have ice chips   IV Fluids, NPO  D/W MOM & Father Jennifer on phone at detail. Mom does not want pt to try on liquid diet  Total care time is 40 minutes.

## 2021-04-16 NOTE — PROGRESS NOTE ADULT - NSICDXPROBLEMPLAN3_GEN_ALL_CORE_FT
Fasting lipids abnormal  Will discuss will GI meds Fasting lipids abnormal  Will discuss meds with GI - LFTs uptrending  - No evidence of gallstones on imaging  - Will trend  - GI consulted

## 2021-04-16 NOTE — PROGRESS NOTE ADULT - ASSESSMENT
acute on chronic pancreatitis     npo  iv fluid  pain control  dc planning once tolerating po  d/w mother at over phone  patient with recent eus at [facility]  being eval for pancreatectomy and islet cell transplant at [facility]    Advanced care planning was discussed with patient and family.  Advanced care planning forms were reviewed and discussed.  Risks, benefits and alternatives of gastroenterologic procedures were discussed in detail and all questions were answered.    30 minutes spent.

## 2021-04-16 NOTE — PROGRESS NOTE ADULT - SUBJECTIVE AND OBJECTIVE BOX
Patient is a 22 yo female from [facility] with PMH of autism, chronic pancreatitis, IDDM type 1 (Insulin pump present), and PCOS presents to the ED c/o epigastric pain x 2 days. Patient states that her sx are similar sx to her episodes of chronic pancreatitis. Patient has frequent flare-ups of her pancreatitis, last being a few weeks ago and did not require hospital stay. Patient was scheduled for total pancreatectomy w/ islet cell transplantation into liver this past January, but the procedure was cancelled due to COVID pandemic. Denies ETOH use, no change in medications. Patient states that her pain is well controlled s/p morphine 4mg IV in the ED. Patient notes mild nausea, which is improved s/p zofran. Denies any fevers, chills, CP, SOB, vomiting, diarrhea, constipation, or back pain. Patient received Moderna 2 of 2, last dose ~1 week ago. No known recent COVID exposures.    INTERVAL HPI:   4/15/21: Patient admitted overnight. Patient seen and examined. Patient tired from late admission. States abdominal pain is improving, now a 4-4.5/10 in epigastric region with radiation to back. States pain regimen and heating pads have been helping her pain. Endorses intermittent nausea. No gallstones seen on imaging, patient denies EtOH use, and lipid panel only mildly elevated. NPO with IVF. Endocrine consulted - Dr C perlman ,for insulin pump.    21: No acute overnight events. Patient seen and examined. Patient tired, limited ROS given fatigue/pain. Says her abdominal pain and nausea are still present. Patient still NPO and on IVF. GI consulted, pt can be DC'd once she can tolerate PO diet. Endocrine consulted for insulin pump - pt wishes to hold off omnipod, started on lantus 8U qam and continue admelog scale q6hr while NPO.      OVERNIGHT EVENTS: none    Home Medications:  Allegra 180 mg oral tablet: 1 tab(s) orally once a day (2021 20:20)  Cranberry oral capsule: 8400 milligram(s) orally once a day (2021 20:20)  Creon 36,000 units oral delayed release capsule: 2 cap(s) orally 3 times a day (2021 20:20)  Creon 36,000 units oral delayed release capsule: 1 cap(s) orally once a day, As Needed with snacks (2021 20:20)  famotidine 40 mg oral tablet: 1 tab(s) orally once a day (at bedtime) (2021 20:20)  Flonase 50 mcg/inh nasal spray: 1 spray(s) nasal once a day (2021 20:20)  gabapentin 300 mg oral tablet: 1 tab(s) orally 2 times a day (2021 20:20)  Junel  oral tablet: 1 tab(s) orally once a day (2021 20:20)  Lantus 100 units/mL subcutaneous solution: Dose dependent on Freestlye Candy Sensor-14 (2021 20:20)  MiraLax oral powder for reconstitution: 17 gram(s) orally once a day (2021 20:20)  Multiple Vitamins oral tablet: 1 tab(s) orally once a day (2021 20:20)  NovoLOG 100 units/mL injectable solution: Sliding scale (2021 20:20)  ursodiol 300 mg oral capsule: 1 cap(s) orally once a day (2021 20:20)  Vitamin B12 1000 mcg oral tablet: 1 tab(s) orally once a day (2021 20:20)  Vitamin C 500 mg oral tablet: 1 tab(s) orally once a day (2021 20:20)  Zofran 4 mg oral tablet: 1 tab(s) orally every 6 hours, As Needed (2021 20:20)      MEDICATIONS  (STANDING):  ascorbic acid 500 milliGRAM(s) Oral daily  cyanocobalamin 1000 MICROGram(s) Oral daily  dextrose 40% Gel 15 Gram(s) Oral once  dextrose 5% + sodium chloride 0.9%. 1000 milliLiter(s) (125 mL/Hr) IV Continuous <Continuous>  dextrose 5%. 1000 milliLiter(s) (50 mL/Hr) IV Continuous <Continuous>  dextrose 5%. 1000 milliLiter(s) (100 mL/Hr) IV Continuous <Continuous>  dextrose 50% Injectable 25 Gram(s) IV Push once  dextrose 50% Injectable 12.5 Gram(s) IV Push once  dextrose 50% Injectable 25 Gram(s) IV Push once  enoxaparin Injectable 40 milliGRAM(s) SubCutaneous daily  famotidine    Tablet 40 milliGRAM(s) Oral two times a day  gabapentin 300 milliGRAM(s) Oral two times a day  glucagon  Injectable 1 milliGRAM(s) IntraMuscular once  insulin glargine Injectable (LANTUS) 8 Unit(s) SubCutaneous every morning  insulin lispro (ADMELOG) corrective regimen sliding scale   SubCutaneous every 6 hours  loratadine 10 milliGRAM(s) Oral daily  multivitamin 1 Tablet(s) Oral daily  pancrelipase  (CREON 36,000 Lipase Units) 2 Capsule(s) Oral three times a day with meals  polyethylene glycol 3350 17 Gram(s) Oral daily  ursodiol Capsule 300 milliGRAM(s) Oral <User Schedule>    MEDICATIONS  (PRN):  fluticasone propionate 50 MICROgram(s)/spray Nasal Spray 1 Spray(s) Both Nostrils two times a day PRN nasal congestion  morphine  - Injectable 2 milliGRAM(s) IV Push every 4 hours PRN Moderate Pain (4 - 6)  morphine  - Injectable 3 milliGRAM(s) IV Push every 4 hours PRN Severe Pain (7 - 10)  morphine  - Injectable 1 milliGRAM(s) IV Push every 3 hours PRN Mild Pain (1 - 3)  ondansetron    Tablet 4 milliGRAM(s) Oral every 6 hours PRN Nausea and/or Vomiting  pancrelipase  (CREON 36,000 Lipase Units) 1 Capsule(s) Oral daily PRN w/ snacks      Allergies    No Known Allergies    Intolerances        REVIEW OF SYSTEMS: Limited due to fatigue/pain  CONSTITUTIONAL: +Fatigue; No fever, No chills, No myalgia, No Body ache, No Weakness  EYES: No eye pain,  No visual disturbances, No discharge, NO Redness  ENMT:  No ear pain, No nose bleed, No vertigo; No sinus or throat pain, No Congestion  NECK: No pain, No stiffness  RESPIRATORY: No cough, wheezing, No  hemoptysis, No shortness of breath  CARDIOVASCULAR: No chest pain, palpitations  GASTROINTESTINAL: No abdominal or epigastric pain. No nausea, No vomiting; No diarrhea or constipation. [  ] BM  GENITOURINARY: No dysuria, No frequency, No urgency, No hematuria, or incontinence  NEUROLOGICAL: No headaches, No dizziness, No numbness, No tingling, No tremors, No weakness  EXT: No Swelling, No Pain, No Edema  SKIN:  [  ] No itching, burning, rashes, or lesions   MUSCULOSKELETAL: No joint pain or swelling; No muscle pain, No back pain, No extremity pain  PSYCHIATRIC: No depression, anxiety, mood swings or difficulty sleeping at night  PAIN SCALE: [  ] None  [  ] Other-  ROS Unable to obtain due to - [  ] Dementia  [  ] Lethargy  [  ] Sedated [  ] non verbal  REST OF REVIEW Of SYSTEM - [  ] Normal     Vital Signs Last 24 Hrs  T(C): 36.8 (2021 05:31), Max: 36.8 (15 Apr 2021 13:00)  T(F): 98.3 (2021 05:31), Max: 98.3 (2021 05:31)  HR: 82 (:) (82 - 95)  BP: 123/77 (2021 05:31) (110/68 - 123/77)  BP(mean): --  RR: 18 (:) (18 - 18)  SpO2: 95% (:) (95% - 96%)  Finger Stick        04-15 @ 07:01  -  04-16 @ 07:00  --------------------------------------------------------  IN: 0 mL / OUT: 700 mL / NET: -700 mL        PHYSICAL EXAM:  GENERAL:  [  ] NAD , [  ] well appearing, [  ] Agitated, [  ] Drowsy,  [  ] Lethargy, [  ] confused   HEAD:  [  ] Normal, [  ] Other  EYES:  [  ] EOMI, [  ] PERRLA, [  ] conjunctiva and sclera clear normal, [  ] Other,  [  ] Pallor,[  ] Discharge  ENMT:  [  ] Normal, [  ] Moist mucous membranes, [  ] Good dentition, [  ] No Thrush  NECK:  [  ] Supple, [  ] No JVD, [  ] Normal thyroid, [  ] Lymphadenopathy [  ] Other  CHEST/LUNG:  [  ] Clear to auscultation bilaterally, [  ] Breath Sounds equal OR Decrease,  [  ] No rales, [  ] No rhonchi  [  ]  No wheezing  HEART:  [  ] Regular rate and rhythm, [  ] tachycardia, [  ] Bradycardia,  [  ] irregular  [  ] No murmurs, No rubs, No gallops, [  ] PPM in place (Mfr:  )  ABDOMEN:  [  ] Soft, [  ] Nontender, [  ] Nondistended, [  ] No mass, [  ] Bowel sounds present, [  ] obese  NERVOUS SYSTEM:  [  ] Alert & Oriented X3, [  ] Nonfocal  [  ] Confusion  [  ] Encephalopathic [  ] Sedated [  ] Unable to assess, [  ] Other-  EXTREMITIES: [  ] 2+ Peripheral Pulses, No clubbing, No cyanosis,  [  ] edema B/L lower EXT. [  ] PVD stasis skin changes B/L Lower EXT  LYMPH: No lymphadenopathy noted  SKIN:  [  ] No rashes or lesions, [  ] Pressure Ulcers, [  ] ecchymosis, [  ] Skin Tears, [  ] Other    DIET:     LABS:                        12.3   4.46  )-----------( 266      ( 2021 06:46 )             37.6     2021 06:46    138    |  107    |  5      ----------------------------<  222    3.6     |  24     |  0.68     Ca    8.6        2021 06:46    TPro  7.5    /  Alb  2.9    /  TBili  0.3    /  DBili  x      /  AST  223    /  ALT  143    /  AlkPhos  89     2021 06:46      Urinalysis Basic - ( 2021 18:52 )    Color: Yellow / Appearance: Slightly Turbid / S.015 / pH: x  Gluc: x / Ketone: Moderate  / Bili: Negative / Urobili: Negative   Blood: x / Protein: 15 / Nitrite: Negative   Leuk Esterase: Moderate / RBC: x / WBC 11-25   Sq Epi: x / Non Sq Epi: Few / Bacteria: Moderate                           Anemia Panel:      Thyroid Panel:        Lipase, Serum: 151 U/L (21 @ 06:46)  Amylase, Serum Total: 22 U/L (21 @ 06:46)  Lipase, Serum: 274 U/L (21 @ 16:23)          RADIOLOGY & ADDITIONAL TESTS:      HEALTH ISSUES - PROBLEM Dx:  Acute pancreatitis    Diabetes mellitus  Insulin dependent    Autism spectrum disorder    Need for prophylactic measure    Dyslipidemia            Consultant(s) Notes Reviewed:  [  ] YES     Care Discussed with [X] Consultants  [  ] Patient  [  ] Family  [  ]   [  ] Social Service  [  ] RN, [  ] Physical Therapy  DVT PPX: [  ] Lovenox, [  ] S C Heparin, [  ] Coumadin, [  ] Xarelto, [  ] Eliquis, [  ] Pradaxa, [  ] IV Heparin drip, [  ] SCD [  ] Contraindication 2 to GI Bleed,[  ] Ambulation  Advanced directive: [  ] None, [  ] DNR/DNI Patient is a 20 yo female from [facility] with PMH of autism, chronic pancreatitis, IDDM type 1 (Insulin pump present), and PCOS presents to the ED c/o epigastric pain x 2 days. Patient states that her sx are similar sx to her episodes of chronic pancreatitis. Patient has frequent flare-ups of her pancreatitis, last being a few weeks ago and did not require hospital stay. Patient was scheduled for total pancreatectomy w/ islet cell transplantation into liver this past January, but the procedure was cancelled due to COVID pandemic. Denies ETOH use, no change in medications. Patient states that her pain is well controlled s/p morphine 4mg IV in the ED. Patient notes mild nausea, which is improved s/p zofran. Denies any fevers, chills, CP, SOB, vomiting, diarrhea, constipation, or back pain. Patient received Moderna 2 of 2, last dose ~1 week ago. No known recent COVID exposures.    INTERVAL HPI:   4/15/21: Patient admitted overnight. Patient seen and examined. Patient tired from late admission. States abdominal pain is improving, now a 4-4.5/10 in epigastric region with radiation to back. States pain regimen and heating pads have been helping her pain. Endorses intermittent nausea. No gallstones seen on imaging, patient denies EtOH use, and lipid panel only mildly elevated. NPO with IVF. Endocrine consulted - Dr C perlman ,for insulin pump.    21: No acute overnight events. Patient seen and examined. Patient tired, limited ROS given fatigue/pain. Says her abdominal pain and nausea are still present. Patient still NPO and on IVF. GI consulted, pt can be DC'd once she can tolerate PO diet. Endocrine consulted for insulin pump - pt wishes to hold off omnipod, started on lantus 8U qam and continue admelog scale q6hr while NPO.      OVERNIGHT EVENTS: none    Home Medications:  Allegra 180 mg oral tablet: 1 tab(s) orally once a day (2021 20:20)  Cranberry oral capsule: 8400 milligram(s) orally once a day (2021 20:20)  Creon 36,000 units oral delayed release capsule: 2 cap(s) orally 3 times a day (2021 20:20)  Creon 36,000 units oral delayed release capsule: 1 cap(s) orally once a day, As Needed with snacks (2021 20:20)  famotidine 40 mg oral tablet: 1 tab(s) orally once a day (at bedtime) (2021 20:20)  Flonase 50 mcg/inh nasal spray: 1 spray(s) nasal once a day (2021 20:20)  gabapentin 300 mg oral tablet: 1 tab(s) orally 2 times a day (2021 20:20)  Junel  oral tablet: 1 tab(s) orally once a day (2021 20:20)  Lantus 100 units/mL subcutaneous solution: Dose dependent on Freestlye Candy Sensor-14 (2021 20:20)  MiraLax oral powder for reconstitution: 17 gram(s) orally once a day (2021 20:20)  Multiple Vitamins oral tablet: 1 tab(s) orally once a day (2021 20:20)  NovoLOG 100 units/mL injectable solution: Sliding scale (2021 20:20)  ursodiol 300 mg oral capsule: 1 cap(s) orally once a day (2021 20:20)  Vitamin B12 1000 mcg oral tablet: 1 tab(s) orally once a day (2021 20:20)  Vitamin C 500 mg oral tablet: 1 tab(s) orally once a day (2021 20:20)  Zofran 4 mg oral tablet: 1 tab(s) orally every 6 hours, As Needed (2021 20:20)      MEDICATIONS  (STANDING):  ascorbic acid 500 milliGRAM(s) Oral daily  cyanocobalamin 1000 MICROGram(s) Oral daily  dextrose 40% Gel 15 Gram(s) Oral once  dextrose 5% + sodium chloride 0.9%. 1000 milliLiter(s) (125 mL/Hr) IV Continuous <Continuous>  dextrose 5%. 1000 milliLiter(s) (50 mL/Hr) IV Continuous <Continuous>  dextrose 5%. 1000 milliLiter(s) (100 mL/Hr) IV Continuous <Continuous>  dextrose 50% Injectable 25 Gram(s) IV Push once  dextrose 50% Injectable 12.5 Gram(s) IV Push once  dextrose 50% Injectable 25 Gram(s) IV Push once  enoxaparin Injectable 40 milliGRAM(s) SubCutaneous daily  famotidine    Tablet 40 milliGRAM(s) Oral two times a day  gabapentin 300 milliGRAM(s) Oral two times a day  glucagon  Injectable 1 milliGRAM(s) IntraMuscular once  insulin glargine Injectable (LANTUS) 8 Unit(s) SubCutaneous every morning  insulin lispro (ADMELOG) corrective regimen sliding scale   SubCutaneous every 6 hours  loratadine 10 milliGRAM(s) Oral daily  multivitamin 1 Tablet(s) Oral daily  pancrelipase  (CREON 36,000 Lipase Units) 2 Capsule(s) Oral three times a day with meals  polyethylene glycol 3350 17 Gram(s) Oral daily  ursodiol Capsule 300 milliGRAM(s) Oral <User Schedule>    MEDICATIONS  (PRN):  fluticasone propionate 50 MICROgram(s)/spray Nasal Spray 1 Spray(s) Both Nostrils two times a day PRN nasal congestion  morphine  - Injectable 2 milliGRAM(s) IV Push every 4 hours PRN Moderate Pain (4 - 6)  morphine  - Injectable 3 milliGRAM(s) IV Push every 4 hours PRN Severe Pain (7 - 10)  morphine  - Injectable 1 milliGRAM(s) IV Push every 3 hours PRN Mild Pain (1 - 3)  ondansetron    Tablet 4 milliGRAM(s) Oral every 6 hours PRN Nausea and/or Vomiting  pancrelipase  (CREON 36,000 Lipase Units) 1 Capsule(s) Oral daily PRN w/ snacks      Allergies    No Known Allergies    Intolerances        REVIEW OF SYSTEMS: Limited due to fatigue/pain  CONSTITUTIONAL: No fever, No chills, +Fatigue, No myalgia, No Body ache, No Weakness  EYES: No eye pain,  No visual disturbances, No discharge, NO Redness  ENMT:  No ear pain, No nose bleed, No vertigo; No sinus or throat pain, No Congestion  NECK: No pain, No stiffness  RESPIRATORY: No cough, wheezing, No  hemoptysis, No shortness of breath  CARDIOVASCULAR: No chest pain, palpitations  GASTROINTESTINAL: +Epigastric pain with radiation to the back. +Nausea, No vomiting; No diarrhea or constipation. [  ] BM  GENITOURINARY: No dysuria, No frequency, No urgency, No hematuria, or incontinence  NEUROLOGICAL: No headaches, No dizziness, No numbness, No tingling, No tremors, No weakness  EXT: No Swelling, No Pain, No Edema  SKIN:  [ x ] No itching, burning, rashes, or lesions   MUSCULOSKELETAL: No joint pain or swelling; No muscle pain, +Back pain, No extremity pain  PSYCHIATRIC: No depression, anxiety, mood swings or difficulty sleeping at night  PAIN SCALE: [  ] None  [ x ] Other-10  ROS Unable to obtain due to - [  ] Dementia  [  ] Lethargy  [  ] Sedated [  ] non verbal  REST OF REVIEW Of SYSTEM - [ x ] Normal     Vital Signs Last 24 Hrs  T(C): 36.8 (2021 05:31), Max: 36.8 (15 Apr 2021 13:00)  T(F): 98.3 (2021 05:31), Max: 98.3 (2021 05:31)  HR: 82 (:) (82 - 95)  BP: 123/77 (2021 05:31) (110/68 - 123/77)  BP(mean): --  RR: 18 (2021 05:31) (18 - 18)  SpO2: 95% (2021 05:31) (95% - 96%)  Finger Stick        04-15 @ 07:01  -  04-16 @ 07:00  --------------------------------------------------------  IN: 0 mL / OUT: 700 mL / NET: -700 mL        PHYSICAL EXAM:  GENERAL:  [x  ] NAD , [ x ] well appearing, [  ] Agitated, [x  ] Drowsy,  [  ] Lethargy, [  ] confused   HEAD:  [ x ] Normal, [  ] Other  EYES:  [ x ] EOMI, [x  ] PERRLA, [x  ] conjunctiva and sclera clear normal, [  ] Other,  [  ] Pallor,[  ] Discharge  ENMT:  [x  ] Normal, [  x] Moist mucous membranes, [  ] Good dentition, [  ] No Thrush  NECK:  [ x ] Supple, [x  ] No JVD, [ x ] Normal thyroid, [  ] Lymphadenopathy [  ] Other  CHEST/LUNG:  [x  ] Clear to auscultation bilaterally, [ x ] Breath Sounds equal OR Decrease,  [x  ] No rales, [ x ] No rhonchi  [x  ]  No wheezing  HEART:  [x  ] Regular rate and rhythm, [  ] tachycardia, [  ] Bradycardia,  [  ] irregular  [ x ] No murmurs, No rubs, No gallops, [  ] PPM in place (Mfr:  )  ABDOMEN:  [ x ] Soft, [ x ] Tender in epigastric region NO R/R/G , [x  ] Nondistended, [ x ] No mass, [ x ] Bowel sounds present, [ x ] obese  NERVOUS SYSTEM:  [x  ] Alert & Oriented X3, [ x ] Nonfocal  [  ] Confusion  [  ] Encephalopathic [  ] Sedated [  ] Unable to assess, [  ] Other-  EXTREMITIES: [x  ] 2+ Peripheral Pulses, No clubbing, No cyanosis,  [  ] edema B/L lower EXT. [  ] PVD stasis skin changes B/L Lower EXT  LYMPH: No lymphadenopathy noted  SKIN:  [x  ] No rashes or lesions, [  ] Pressure Ulcers, [  ] ecchymosis, [  ] Skin Tears, [  ] Other    DIET: NPO    LABS:                        12.3   4.46  )-----------( 266      ( 2021 06:46 )             37.6     2021 06:46    138    |  107    |  5      ----------------------------<  222    3.6     |  24     |  0.68     Ca    8.6        2021 06:46    TPro  7.5    /  Alb  2.9    /  TBili  0.3    /  DBili  x      /  AST  223    /  ALT  143    /  AlkPhos  89     2021 06:46      Urinalysis Basic - ( 2021 18:52 )    Color: Yellow / Appearance: Slightly Turbid / S.015 / pH: x  Gluc: x / Ketone: Moderate  / Bili: Negative / Urobili: Negative   Blood: x / Protein: 15 / Nitrite: Negative   Leuk Esterase: Moderate / RBC: x / WBC 11-25   Sq Epi: x / Non Sq Epi: Few / Bacteria: Moderate                           Anemia Panel:      Thyroid Panel:        Lipase, Serum: 151 U/L (21 @ 06:46)  Amylase, Serum Total: 22 U/L (21 @ 06:46)  Lipase, Serum: 274 U/L (21 @ 16:23)      HEALTH ISSUES - PROBLEM Dx:  Acute pancreatitis    Diabetes mellitus  Insulin dependent    Autism spectrum disorder    Need for prophylactic measure    Dyslipidemia            Consultant(s) Notes Reviewed:  [ x ] YES     Care Discussed with [X] Consultants  [ x ] Patient  [ x ] Family  [  ]   [  ] Social Service  [  ] RN, [  ] Physical Therapy  DVT PPX: [ x ] Lovenox, [  ] S C Heparin, [  ] Coumadin, [  ] Xarelto, [  ] Eliquis, [  ] Pradaxa, [  ] IV Heparin drip, [  ] SCD [  ] Contraindication 2 to GI Bleed,[  ] Ambulation  Advanced directive: [ x ] None, [  ] DNR/DNI Patient is a 20 yo female from [facility] with PMH of autism, chronic pancreatitis, IDDM type 1 (Insulin pump present), and PCOS presents to the ED c/o epigastric pain x 2 days. Patient states that her sx are similar sx to her episodes of chronic pancreatitis. Patient has frequent flare-ups of her pancreatitis, last being a few weeks ago and did not require hospital stay. Patient was scheduled for total pancreatectomy w/ islet cell transplantation into liver this past January, but the procedure was cancelled due to COVID pandemic. Denies ETOH use, no change in medications. Patient states that her pain is well controlled s/p morphine 4mg IV in the ED. Patient notes mild nausea, which is improved s/p zofran. Denies any fevers, chills, CP, SOB, vomiting, diarrhea, constipation, or back pain. Patient received Moderna 2 of 2, last dose ~1 week ago. No known recent COVID exposures.    INTERVAL HPI:   4/15/21: Patient admitted overnight. Patient seen and examined. Patient tired from late admission. States abdominal pain is improving, now a 4-4.5/10 in epigastric region with radiation to back. States pain regimen and heating pads have been helping her pain. Endorses intermittent nausea. No gallstones seen on imaging, patient denies EtOH use, and lipid panel only mildly elevated. NPO with IVF. Endocrine consulted - Dr C perlman ,for insulin pump.    21: No acute overnight events. Patient seen and examined. Patient tired, limited ROS given fatigue/pain. Says her abdominal pain and nausea are still present. Patient still NPO and on IVF. GI consulted, pt can be DC'd once she can tolerate PO diet. Endocrine consulted for insulin pump - pt wishes to hold off omnipod, started on Lantus 8U qam and continue Admelog scale q6hr while NPO., On IV Fluids.      OVERNIGHT EVENTS: none    Home Medications:  Allegra 180 mg oral tablet: 1 tab(s) orally once a day (2021 20:20)  Cranberry oral capsule: 8400 milligram(s) orally once a day (2021 20:20)  Creon 36,000 units oral delayed release capsule: 2 cap(s) orally 3 times a day (2021 20:20)  Creon 36,000 units oral delayed release capsule: 1 cap(s) orally once a day, As Needed with snacks (2021 20:20)  famotidine 40 mg oral tablet: 1 tab(s) orally once a day (at bedtime) (2021 20:20)  Flonase 50 mcg/inh nasal spray: 1 spray(s) nasal once a day (2021 20:20)  gabapentin 300 mg oral tablet: 1 tab(s) orally 2 times a day (2021 20:20)  Junel  oral tablet: 1 tab(s) orally once a day (2021 20:20)  Lantus 100 units/mL subcutaneous solution: Dose dependent on Freestlye Candy Sensor-14 (2021 20:20)  MiraLax oral powder for reconstitution: 17 gram(s) orally once a day (2021 20:20)  Multiple Vitamins oral tablet: 1 tab(s) orally once a day (2021 20:20)  NovoLOG 100 units/mL injectable solution: Sliding scale (2021 20:20)  ursodiol 300 mg oral capsule: 1 cap(s) orally once a day (2021 20:20)  Vitamin B12 1000 mcg oral tablet: 1 tab(s) orally once a day (2021 20:20)  Vitamin C 500 mg oral tablet: 1 tab(s) orally once a day (2021 20:20)  Zofran 4 mg oral tablet: 1 tab(s) orally every 6 hours, As Needed (2021 20:20)      MEDICATIONS  (STANDING):  ascorbic acid 500 milliGRAM(s) Oral daily  cyanocobalamin 1000 MICROGram(s) Oral daily  dextrose 40% Gel 15 Gram(s) Oral once  dextrose 5% + sodium chloride 0.9%. 1000 milliLiter(s) (125 mL/Hr) IV Continuous <Continuous>  dextrose 5%. 1000 milliLiter(s) (50 mL/Hr) IV Continuous <Continuous>  dextrose 5%. 1000 milliLiter(s) (100 mL/Hr) IV Continuous <Continuous>  dextrose 50% Injectable 25 Gram(s) IV Push once  dextrose 50% Injectable 12.5 Gram(s) IV Push once  dextrose 50% Injectable 25 Gram(s) IV Push once  enoxaparin Injectable 40 milliGRAM(s) SubCutaneous daily  famotidine    Tablet 40 milliGRAM(s) Oral two times a day  gabapentin 300 milliGRAM(s) Oral two times a day  glucagon  Injectable 1 milliGRAM(s) IntraMuscular once  insulin glargine Injectable (LANTUS) 8 Unit(s) SubCutaneous every morning  insulin lispro (ADMELOG) corrective regimen sliding scale   SubCutaneous every 6 hours  loratadine 10 milliGRAM(s) Oral daily  multivitamin 1 Tablet(s) Oral daily  pancrelipase  (CREON 36,000 Lipase Units) 2 Capsule(s) Oral three times a day with meals  polyethylene glycol 3350 17 Gram(s) Oral daily  ursodiol Capsule 300 milliGRAM(s) Oral <User Schedule>    MEDICATIONS  (PRN):  fluticasone propionate 50 MICROgram(s)/spray Nasal Spray 1 Spray(s) Both Nostrils two times a day PRN nasal congestion  morphine  - Injectable 2 milliGRAM(s) IV Push every 4 hours PRN Moderate Pain (4 - 6)  morphine  - Injectable 3 milliGRAM(s) IV Push every 4 hours PRN Severe Pain (7 - 10)  morphine  - Injectable 1 milliGRAM(s) IV Push every 3 hours PRN Mild Pain (1 - 3)  ondansetron    Tablet 4 milliGRAM(s) Oral every 6 hours PRN Nausea and/or Vomiting  pancrelipase  (CREON 36,000 Lipase Units) 1 Capsule(s) Oral daily PRN w/ snacks      Allergies    No Known Allergies    Intolerances        REVIEW OF SYSTEMS: Limited due to fatigue/pain  CONSTITUTIONAL: No fever, No chills, +Fatigue, No myalgia, No Body ache, No Weakness  EYES: No eye pain,  No visual disturbances, No discharge, NO Redness  ENMT:  No ear pain, No nose bleed, No vertigo; No sinus or throat pain, No Congestion  NECK: No pain, No stiffness  RESPIRATORY: No cough, wheezing, No  hemoptysis, No shortness of breath  CARDIOVASCULAR: No chest pain, palpitations  GASTROINTESTINAL: +Epigastric pain with radiation to the back. +Nausea, No vomiting; No diarrhea or constipation. [  ] BM  GENITOURINARY: No dysuria, No frequency, No urgency, No hematuria, or incontinence  NEUROLOGICAL: No headaches, No dizziness, No numbness, No tingling, No tremors, No weakness  EXT: No Swelling, No Pain, No Edema  SKIN:  [ x ] No itching, burning, rashes, or lesions   MUSCULOSKELETAL: No joint pain or swelling; No muscle pain, +Back pain, No extremity pain  PSYCHIATRIC: No depression, anxiety, mood swings or difficulty sleeping at night  PAIN SCALE: [  ] None  [ x ] Other-4/10  ROS Unable to obtain due to - [  ] Dementia  [  ] Lethargy  [  ] Sedated [  ] non verbal  REST OF REVIEW Of SYSTEM - [ x ] Normal     Vital Signs Last 24 Hrs  T(C): 36.8 (2021 05:31), Max: 36.8 (15 Apr 2021 13:00)  T(F): 98.3 (2021 05:31), Max: 98.3 (2021 05:31)  HR: 82 (:) (82 - 95)  BP: 123/77 (2021 05:31) (110/68 - 123/77)  BP(mean): --  RR: 18 (2021 05:31) (18 - 18)  SpO2: 95% (2021 05:31) (95% - 96%)  Finger Stick        04-15 @ 07:01  -  04-16 @ 07:00  --------------------------------------------------------  IN: 0 mL / OUT: 700 mL / NET: -700 mL        PHYSICAL EXAM:  GENERAL:  [x  ] NAD , [ x ] well appearing, [  ] Agitated, [x  ] Drowsy,  [  ] Lethargy, [  ] confused   HEAD:  [ x ] Normal, [  ] Other  EYES:  [ x ] EOMI, [x  ] PERRLA, [x  ] conjunctiva and sclera clear normal, [  ] Other,  [  ] Pallor,[  ] Discharge  ENMT:  [x  ] Normal, [  x] dry  mucous membranes, [  ] Good dentition, [ x ] No Thrush  NECK:  [ x ] Supple, [x  ] No JVD, [ x ] Normal thyroid, [  ] Lymphadenopathy [  ] Other  CHEST/LUNG:  [x  ] Clear to auscultation bilaterally, [ x ] Breath Sounds equal B/L ,  [x  ] No rales, [ x ] No rhonchi  [x  ]  No wheezing  HEART:  [x  ] Regular rate and rhythm, [  ] tachycardia, [  ] Bradycardia,  [  ] irregular  [ x ] No murmurs, No rubs, No gallops, [  ] PPM in place (Mfr:  )  ABDOMEN:  [ x ] Soft, [ x ] Tender in epigastric region NO R/R/G , [x  ] Nondistended, [ x ] No mass, [ x ] Bowel sounds present, [ x ] obese  NERVOUS SYSTEM:  [x  ] Alert & Oriented X3, [ x ] Nonfocal  [  ] Confusion  [  ] Encephalopathic [  ] Sedated [  ] Unable to assess, [  ] Other-  EXTREMITIES: [x  ] 2+ Peripheral Pulses, No clubbing, No cyanosis,  [  ] edema B/L lower EXT. [  ] PVD stasis skin changes B/L Lower EXT  LYMPH: No lymphadenopathy noted  SKIN:  [x  ] No rashes or lesions, [  ] Pressure Ulcers, [  ] ecchymosis, [  ] Skin Tears, [  ] Other    DIET: NPO    LABS:                        12.3   4.46  )-----------( 266      ( 2021 06:46 )             37.6     2021 06:46    138    |  107    |  5      ----------------------------<  222    3.6     |  24     |  0.68     Ca    8.6        2021 06:46    TPro  7.5    /  Alb  2.9    /  TBili  0.3    /  DBili  x      /  AST  223    /  ALT  143    /  AlkPhos  89     2021 06:46      Urinalysis Basic - ( 2021 18:52 )    Color: Yellow / Appearance: Slightly Turbid / S.015 / pH: x  Gluc: x / Ketone: Moderate  / Bili: Negative / Urobili: Negative   Blood: x / Protein: 15 / Nitrite: Negative   Leuk Esterase: Moderate / RBC: x / WBC 11-25   Sq Epi: x / Non Sq Epi: Few / Bacteria: Moderate        Lipase, Serum: 151 U/L (21 @ 06:46)  Amylase, Serum Total: 22 U/L (21 @ 06:46)  Lipase, Serum: 274 U/L (21 @ 16:23)      HEALTH ISSUES - PROBLEM Dx:  Acute pancreatitis    Diabetes mellitus  Insulin dependent    Autism spectrum disorder    Need for prophylactic measure    Dyslipidemia        Consultant(s) Notes Reviewed:  [ x ] YES     Care Discussed with [X] Consultants  [ x ] Patient  [ x ] Family  [  ]   [  ] Social Service  [ x ] RN, [  ] Physical Therapy  DVT PPX: [ x ] Lovenox, [  ] S C Heparin, [  ] Coumadin, [  ] Xarelto, [  ] Eliquis, [  ] Pradaxa, [  ] IV Heparin drip, [  ] SCD [  ] Contraindication 2 to GI Bleed,[  ] Ambulation  Advanced directive: [ x ] None, [  ] DNR/DNI

## 2021-04-16 NOTE — PROGRESS NOTE ADULT - SUBJECTIVE AND OBJECTIVE BOX
CAPILLARY BLOOD GLUCOSE      POCT Blood Glucose.: 211 mg/dL (16 Apr 2021 05:27)  POCT Blood Glucose.: 181 mg/dL (15 Apr 2021 21:26)  POCT Blood Glucose.: 179 mg/dL (15 Apr 2021 16:45)  POCT Blood Glucose.: 201 mg/dL (15 Apr 2021 11:26)  POCT Blood Glucose.: 218 mg/dL (15 Apr 2021 07:45)      Vital Signs Last 24 Hrs  T(C): 36.8 (16 Apr 2021 05:31), Max: 36.8 (15 Apr 2021 13:00)  T(F): 98.3 (16 Apr 2021 05:31), Max: 98.3 (16 Apr 2021 05:31)  HR: 82 (16 Apr 2021 05:31) (82 - 95)  BP: 123/77 (16 Apr 2021 05:31) (110/68 - 123/77)  BP(mean): --  RR: 18 (16 Apr 2021 05:31) (18 - 18)  SpO2: 95% (16 Apr 2021 05:31) (95% - 96%)    General: WN/WD NAD  Respiratory: CTA B/L  CV: RRR, S1S2, no murmurs, rubs or gallops  Abdominal: Soft, NT, ND +BS, Last BM  Extremities: No edema, + peripheral pulses     04-16    138  |  107  |  5<L>  ----------------------------<  222<H>  3.6   |  24  |  0.68    Ca    8.6      16 Apr 2021 06:46    TPro  7.5  /  Alb  2.9<L>  /  TBili  0.3  /  DBili  x   /  AST  223<H>  /  ALT  143<H>  /  AlkPhos  89  04-16      dextrose 40% Gel 15 Gram(s) Oral once  dextrose 50% Injectable 25 Gram(s) IV Push once  dextrose 50% Injectable 12.5 Gram(s) IV Push once  dextrose 50% Injectable 25 Gram(s) IV Push once  glucagon  Injectable 1 milliGRAM(s) IntraMuscular once  insulin lispro (ADMELOG) corrective regimen sliding scale   SubCutaneous every 6 hours

## 2021-04-16 NOTE — PROGRESS NOTE ADULT - NSICDXPROBLEMPLAN1_GEN_ALL_CORE_FT
type 3c diabetes  pt wishes to hold off on resuming omnipod at this time  start lantus 8 units qam  cont admelog scale coverage q6hrs while npo  goal bg 100-180 in hosp setting

## 2021-04-16 NOTE — PROGRESS NOTE ADULT - NSICDXPROBLEMPLAN2_GEN_ALL_CORE_FT
Hx of IDDM1, insulin pump present  -Dr. Perlman consulted: pt wishes to hold off omnipod. Start Lantus 8U qam, cont admelog scale q6hr while NPO  -Continue D5/NS   -Hypoglycemia protocol

## 2021-04-17 LAB
ALBUMIN SERPL ELPH-MCNC: 2.8 G/DL — LOW (ref 3.3–5)
ALP SERPL-CCNC: 82 U/L — SIGNIFICANT CHANGE UP (ref 40–120)
ALT FLD-CCNC: 229 U/L — HIGH (ref 12–78)
ANION GAP SERPL CALC-SCNC: 9 MMOL/L — SIGNIFICANT CHANGE UP (ref 5–17)
AST SERPL-CCNC: 361 U/L — HIGH (ref 15–37)
BILIRUB SERPL-MCNC: 0.3 MG/DL — SIGNIFICANT CHANGE UP (ref 0.2–1.2)
BUN SERPL-MCNC: 5 MG/DL — LOW (ref 7–23)
CALCIUM SERPL-MCNC: 8.6 MG/DL — SIGNIFICANT CHANGE UP (ref 8.5–10.1)
CHLORIDE SERPL-SCNC: 107 MMOL/L — SIGNIFICANT CHANGE UP (ref 96–108)
CO2 SERPL-SCNC: 24 MMOL/L — SIGNIFICANT CHANGE UP (ref 22–31)
CREAT SERPL-MCNC: 0.62 MG/DL — SIGNIFICANT CHANGE UP (ref 0.5–1.3)
GLUCOSE SERPL-MCNC: 216 MG/DL — HIGH (ref 70–99)
HCT VFR BLD CALC: 36.1 % — SIGNIFICANT CHANGE UP (ref 34.5–45)
HGB BLD-MCNC: 11.8 G/DL — SIGNIFICANT CHANGE UP (ref 11.5–15.5)
MCHC RBC-ENTMCNC: 29.4 PG — SIGNIFICANT CHANGE UP (ref 27–34)
MCHC RBC-ENTMCNC: 32.7 GM/DL — SIGNIFICANT CHANGE UP (ref 32–36)
MCV RBC AUTO: 90 FL — SIGNIFICANT CHANGE UP (ref 80–100)
NRBC # BLD: 0 /100 WBCS — SIGNIFICANT CHANGE UP (ref 0–0)
PLATELET # BLD AUTO: 256 K/UL — SIGNIFICANT CHANGE UP (ref 150–400)
POTASSIUM SERPL-MCNC: 3.5 MMOL/L — SIGNIFICANT CHANGE UP (ref 3.5–5.3)
POTASSIUM SERPL-SCNC: 3.5 MMOL/L — SIGNIFICANT CHANGE UP (ref 3.5–5.3)
PROT SERPL-MCNC: 7.3 G/DL — SIGNIFICANT CHANGE UP (ref 6–8.3)
RBC # BLD: 4.01 M/UL — SIGNIFICANT CHANGE UP (ref 3.8–5.2)
RBC # FLD: 12.1 % — SIGNIFICANT CHANGE UP (ref 10.3–14.5)
SODIUM SERPL-SCNC: 140 MMOL/L — SIGNIFICANT CHANGE UP (ref 135–145)
WBC # BLD: 4.84 K/UL — SIGNIFICANT CHANGE UP (ref 3.8–10.5)
WBC # FLD AUTO: 4.84 K/UL — SIGNIFICANT CHANGE UP (ref 3.8–10.5)

## 2021-04-17 PROCEDURE — 76705 ECHO EXAM OF ABDOMEN: CPT | Mod: 26

## 2021-04-17 RX ORDER — INSULIN GLARGINE 100 [IU]/ML
10 INJECTION, SOLUTION SUBCUTANEOUS EVERY MORNING
Refills: 0 | Status: DISCONTINUED | OUTPATIENT
Start: 2021-04-18 | End: 2021-04-20

## 2021-04-17 RX ADMIN — POLYETHYLENE GLYCOL 3350 17 GRAM(S): 17 POWDER, FOR SOLUTION ORAL at 12:18

## 2021-04-17 RX ADMIN — FAMOTIDINE 40 MILLIGRAM(S): 10 INJECTION INTRAVENOUS at 05:22

## 2021-04-17 RX ADMIN — LORATADINE 10 MILLIGRAM(S): 10 TABLET ORAL at 12:20

## 2021-04-17 RX ADMIN — MORPHINE SULFATE 2 MILLIGRAM(S): 50 CAPSULE, EXTENDED RELEASE ORAL at 01:37

## 2021-04-17 RX ADMIN — MORPHINE SULFATE 1 MILLIGRAM(S): 50 CAPSULE, EXTENDED RELEASE ORAL at 20:37

## 2021-04-17 RX ADMIN — PREGABALIN 1000 MICROGRAM(S): 225 CAPSULE ORAL at 12:18

## 2021-04-17 RX ADMIN — MORPHINE SULFATE 2 MILLIGRAM(S): 50 CAPSULE, EXTENDED RELEASE ORAL at 14:14

## 2021-04-17 RX ADMIN — Medication 2: at 18:32

## 2021-04-17 RX ADMIN — ENOXAPARIN SODIUM 40 MILLIGRAM(S): 100 INJECTION SUBCUTANEOUS at 12:17

## 2021-04-17 RX ADMIN — URSODIOL 300 MILLIGRAM(S): 250 TABLET, FILM COATED ORAL at 05:22

## 2021-04-17 RX ADMIN — SODIUM CHLORIDE 125 MILLILITER(S): 9 INJECTION, SOLUTION INTRAVENOUS at 22:49

## 2021-04-17 RX ADMIN — MORPHINE SULFATE 1 MILLIGRAM(S): 50 CAPSULE, EXTENDED RELEASE ORAL at 20:55

## 2021-04-17 RX ADMIN — GABAPENTIN 300 MILLIGRAM(S): 400 CAPSULE ORAL at 05:22

## 2021-04-17 RX ADMIN — Medication 1 TABLET(S): at 12:18

## 2021-04-17 RX ADMIN — Medication 3: at 06:15

## 2021-04-17 RX ADMIN — MORPHINE SULFATE 2 MILLIGRAM(S): 50 CAPSULE, EXTENDED RELEASE ORAL at 13:59

## 2021-04-17 RX ADMIN — FAMOTIDINE 40 MILLIGRAM(S): 10 INJECTION INTRAVENOUS at 18:12

## 2021-04-17 RX ADMIN — Medication 500 MILLIGRAM(S): at 12:18

## 2021-04-17 RX ADMIN — GABAPENTIN 300 MILLIGRAM(S): 400 CAPSULE ORAL at 18:11

## 2021-04-17 RX ADMIN — Medication 1: at 00:03

## 2021-04-17 RX ADMIN — Medication 1: at 12:11

## 2021-04-17 RX ADMIN — MORPHINE SULFATE 2 MILLIGRAM(S): 50 CAPSULE, EXTENDED RELEASE ORAL at 01:07

## 2021-04-17 NOTE — PROGRESS NOTE ADULT - ASSESSMENT
acute on chronic pancreatitis       npo  iv fluid  pain control  dc planning once tolerating po  d/w mother at over phone  patient with recent eus at [facility]  being eval for pancreatectomy and islet cell transplant at [place]    Advanced care planning was discussed with patient and family.  Advanced care planning forms were reviewed and discussed.  Risks, benefits and alternatives of gastroenterologic procedures were discussed in detail and all questions were answered.    30 minutes spent. acute on chronic pancreatitis       npo  iv fluid  pain control  Adv diet to clears  dc planning once tolerating po  d/w mother at over phone  patient with recent eus at [facility]  being eval for pancreatectomy and islet cell transplant at [place]    Advanced care planning was discussed with patient and family.  Advanced care planning forms were reviewed and discussed.  Risks, benefits and alternatives of gastroenterologic procedures were discussed in detail and all questions were answered.    30 minutes spent.

## 2021-04-17 NOTE — PROGRESS NOTE ADULT - NSICDXPROBLEMPLAN1_GEN_ALL_CORE_FT
type 3c diabetes  pt wishes to hold off on resuming omnipod at this time  increase lantus 10 units qam  cont admelog scale coverage q6hrs while npo  goal bg 100-180 in hosp setting

## 2021-04-17 NOTE — PROGRESS NOTE ADULT - SUBJECTIVE AND OBJECTIVE BOX
Patient is a 21y old  Female who presents with a chief complaint of Acute pancreatitis (17 Apr 2021 10:12)      HPI:  Patient is a 20 yo female from [facility] with PMH of autism, chronic pancreatitis, IDDM type 1 (Insulin pump present), and PCOS presents to the ED c/o epigastric pain x 2 days. Patient states that her sx are similar sx to her episodes of chronic pancreatitis. Patient has frequent flare-ups of her pancreatitis, last being a few weeks ago and did not require hospital stay. Patient was scheduled for total pancreatectomy w/ islet cell transplantation into liver this past January, but the procedure was cancelled due to COVID pandemic. Denies ETOH use, no change in medications. Patient states that her pain is well controlled s/p morphine 4mg IV in the ED. Patient notes mild nausea, which is improved s/p zofran. Denies any fevers, chills, CP, SOB, vomiting, diarrhea, constipation, or back pain. Patient received Moderna 2 of 2, last dose ~1 week ago. No known recent COVID exposures.    In the ED  VS- Temp 97.9F,  -->102, /76, RR 18, SpO2 97% on RA  Labs significant for lipase WNL, lactate 2.2, glu 222, AST 84,   CT a/p (+) acute pancreatitis, underlying chronic pancreatitis, hepatic steatosis  ECG showed sinus tachycardia, no signs of acute ischemia  s/p 1L NS, morphine 4mg IV x2, zofran 4mg IV x1 in the ED (14 Apr 2021 19:23)      INTERVAL HPI:  4/15/21: Patient admitted overnight. Patient seen and examined. Patient tired from late admission. States abdominal pain is improving, now a 4-4.5/10 in epigastric region with radiation to back. States pain regimen and heating pads have been helping her pain. Endorses intermittent nausea. No gallstones seen on imaging, patient denies EtOH use, and lipid panel only mildly elevated. NPO with IVF. Endocrine consulted - Dr C perlman ,for insulin pump.    4/16/21: No acute overnight events. Patient seen and examined. Patient tired, limited ROS given fatigue/pain. Says her abdominal pain and nausea are still present. Patient still NPO and on IVF. GI consulted, pt can be DC'd once she can tolerate PO diet. Endocrine consulted for insulin pump - pt wishes to hold off omnipod, started on Lantus 8U qam and continue Admelog scale q6hr while NPO., On IV Fluids.    4/17/21: Patient seen and examined. Patient tired, and sleeping during interview. States that abdominal pain and nausea are still present. Pain located on mid right abdomen and liver enzymes trending up, will obtain RUQ US. Denies vomiting. currently NPO and on IVF. Pain controlled with current pain regimen. GI advanced diet to clear liquid for lunch.    OVERNIGHT EVENTS: NONE    Home Medications:  Allegra 180 mg oral tablet: 1 tab(s) orally once a day (14 Apr 2021 20:20)  Cranberry oral capsule: 8400 milligram(s) orally once a day (14 Apr 2021 20:20)  Creon 36,000 units oral delayed release capsule: 2 cap(s) orally 3 times a day (14 Apr 2021 20:20)  Creon 36,000 units oral delayed release capsule: 1 cap(s) orally once a day, As Needed with snacks (14 Apr 2021 20:20)  famotidine 40 mg oral tablet: 1 tab(s) orally once a day (at bedtime) (14 Apr 2021 20:20)  Flonase 50 mcg/inh nasal spray: 1 spray(s) nasal once a day (14 Apr 2021 20:20)  gabapentin 300 mg oral tablet: 1 tab(s) orally 2 times a day (14 Apr 2021 20:20)  Junel 1/20 oral tablet: 1 tab(s) orally once a day (14 Apr 2021 20:20)  Lantus 100 units/mL subcutaneous solution: Dose dependent on Freestlye Candy Sensor-14 (14 Apr 2021 20:20)  MiraLax oral powder for reconstitution: 17 gram(s) orally once a day (14 Apr 2021 20:20)  Multiple Vitamins oral tablet: 1 tab(s) orally once a day (14 Apr 2021 20:20)  NovoLOG 100 units/mL injectable solution: Sliding scale (14 Apr 2021 20:20)  ursodiol 300 mg oral capsule: 1 cap(s) orally once a day (14 Apr 2021 20:20)  Vitamin B12 1000 mcg oral tablet: 1 tab(s) orally once a day (14 Apr 2021 20:20)  Vitamin C 500 mg oral tablet: 1 tab(s) orally once a day (14 Apr 2021 20:20)  Zofran 4 mg oral tablet: 1 tab(s) orally every 6 hours, As Needed (14 Apr 2021 20:20)      MEDICATIONS  (STANDING):  ascorbic acid 500 milliGRAM(s) Oral daily  cyanocobalamin 1000 MICROGram(s) Oral daily  dextrose 40% Gel 15 Gram(s) Oral once  dextrose 5% + sodium chloride 0.9%. 1000 milliLiter(s) (125 mL/Hr) IV Continuous <Continuous>  dextrose 5%. 1000 milliLiter(s) (50 mL/Hr) IV Continuous <Continuous>  dextrose 5%. 1000 milliLiter(s) (100 mL/Hr) IV Continuous <Continuous>  dextrose 50% Injectable 25 Gram(s) IV Push once  dextrose 50% Injectable 12.5 Gram(s) IV Push once  dextrose 50% Injectable 25 Gram(s) IV Push once  enoxaparin Injectable 40 milliGRAM(s) SubCutaneous daily  famotidine    Tablet 40 milliGRAM(s) Oral two times a day  gabapentin 300 milliGRAM(s) Oral two times a day  glucagon  Injectable 1 milliGRAM(s) IntraMuscular once  insulin lispro (ADMELOG) corrective regimen sliding scale   SubCutaneous every 6 hours  loratadine 10 milliGRAM(s) Oral daily  multivitamin 1 Tablet(s) Oral daily  pancrelipase  (CREON 36,000 Lipase Units) 2 Capsule(s) Oral three times a day with meals  polyethylene glycol 3350 17 Gram(s) Oral daily  ursodiol Capsule 300 milliGRAM(s) Oral <User Schedule>    MEDICATIONS  (PRN):  fluticasone propionate 50 MICROgram(s)/spray Nasal Spray 1 Spray(s) Both Nostrils two times a day PRN nasal congestion  morphine  - Injectable 2 milliGRAM(s) IV Push every 4 hours PRN Moderate Pain (4 - 6)  morphine  - Injectable 3 milliGRAM(s) IV Push every 4 hours PRN Severe Pain (7 - 10)  morphine  - Injectable 1 milliGRAM(s) IV Push every 3 hours PRN Mild Pain (1 - 3)  ondansetron    Tablet 4 milliGRAM(s) Oral every 6 hours PRN Nausea and/or Vomiting  pancrelipase  (CREON 36,000 Lipase Units) 1 Capsule(s) Oral daily PRN w/ snacks      No Known Allergies      Social History:  Denies any ETOH, tobacco, or illicit drug use  Lives in group home for ASD  Independent in all ADLs  Ambulates independently w/ no assistive device  Received Moderna 2 of 2  Does not recall LMP, ~few months ago, hx of PCOS (14 Apr 2021 19:23)      REVIEW OF SYSTEMS: Limited due to fatigue/pain  CONSTITUTIONAL: No fever, No chills, +Fatigue, No myalgia, No Body ache, No Weakness  EYES: No eye pain,  No visual disturbances, No discharge, NO Redness  ENMT:  No ear pain, No nose bleed, No vertigo; No sinus or throat pain, No Congestion  NECK: No pain, No stiffness  RESPIRATORY: No cough, wheezing, No  hemoptysis, No shortness of breath  CARDIOVASCULAR: No chest pain, palpitations  GASTROINTESTINAL: +Epigastric pain with radiation to the back. +Nausea, No vomiting; No diarrhea or constipation. [  ] BM  GENITOURINARY: No dysuria, No frequency, No urgency, No hematuria, or incontinence  NEUROLOGICAL: No headaches, No dizziness, No numbness, No tingling, No tremors, No weakness  EXT: No Swelling, No Pain, No Edema  SKIN:  [ x ] No itching, burning, rashes, or lesions   MUSCULOSKELETAL: No joint pain or swelling; No muscle pain, +Back pain, No extremity pain  PSYCHIATRIC: No depression, anxiety, mood swings or difficulty sleeping at night  PAIN SCALE: [  ] None  [ x ] Other-4/10  ROS Unable to obtain due to - [  ] Dementia  [  ] Lethargy  [  ] Sedated [  ] non verbal  REST OF REVIEW Of SYSTEM - [ x ] Normal     Vital Signs Last 24 Hrs  T(C): 36.4 (17 Apr 2021 05:11), Max: 36.9 (16 Apr 2021 21:15)  T(F): 97.6 (17 Apr 2021 05:11), Max: 98.4 (16 Apr 2021 21:15)  HR: 86 (17 Apr 2021 05:11) (75 - 109)  BP: 103/66 (17 Apr 2021 05:11) (103/66 - 108/67)  BP(mean): --  RR: 18 (17 Apr 2021 05:11) (17 - 18)  SpO2: 95% (17 Apr 2021 05:11) (95% - 95%)    CAPILLARY BLOOD GLUCOSE      POCT Blood Glucose.: 182 mg/dL (17 Apr 2021 11:50)  POCT Blood Glucose.: 251 mg/dL (17 Apr 2021 06:12)  POCT Blood Glucose.: 199 mg/dL (16 Apr 2021 23:56)  POCT Blood Glucose.: 179 mg/dL (16 Apr 2021 18:00)      I&O's Summary    PHYSICAL EXAM:  GENERAL:  [x  ] NAD , [ x ] well appearing, [  ] Agitated, [x  ] Drowsy,  [  ] Lethargy, [  ] confused   HEAD:  [ x ] Normal, [  ] Other  EYES:  [ x ] EOMI, [x  ] PERRLA, [x  ] conjunctiva and sclera clear normal, [  ] Other,  [  ] Pallor,[  ] Discharge  ENMT:  [x  ] Normal, [  x] dry  mucous membranes, [  ] Good dentition, [ x ] No Thrush  NECK:  [ x ] Supple, [x  ] No JVD, [ x ] Normal thyroid, [  ] Lymphadenopathy [  ] Other  CHEST/LUNG:  [x  ] Clear to auscultation bilaterally, [ x ] Breath Sounds equal B/L ,  [x  ] No rales, [ x ] No rhonchi  [x  ]  No wheezing  HEART:  [x  ] Regular rate and rhythm, [  ] tachycardia, [  ] Bradycardia,  [  ] irregular  [ x ] No murmurs, No rubs, No gallops, [  ] PPM in place (Mfr:  )  ABDOMEN:  [ x ] Soft, [ x ] Tender in epigastric region NO R/R/G , [x  ] Nondistended, [ x ] No mass, [ x ] Bowel sounds present, [ x ] obese  NERVOUS SYSTEM:  [x  ] Alert & Oriented X3, [ x ] Nonfocal  [  ] Confusion  [  ] Encephalopathic [  ] Sedated [  ] Unable to assess, [  ] Other-  EXTREMITIES: [x  ] 2+ Peripheral Pulses, No clubbing, No cyanosis,  [  ] edema B/L lower EXT. [  ] PVD stasis skin changes B/L Lower EXT  LYMPH: No lymphadenopathy noted  SKIN:  [x  ] No rashes or lesions, [  ] Pressure Ulcers, [  ] ecchymosis, [  ] Skin Tears, [  ] Other    DIET: Diet, Clear Liquid (04-17-21 @ 10:29)      LABS:                        11.8   4.84  )-----------( 256      ( 17 Apr 2021 05:38 )             36.1     17 Apr 2021 05:38    140    |  107    |  5      ----------------------------<  216    3.5     |  24     |  0.62     Ca    8.6        17 Apr 2021 05:38    TPro  7.3    /  Alb  2.8    /  TBili  0.3    /  DBili  x      /  AST  361    /  ALT  229    /  AlkPhos  82     17 Apr 2021 05:38        Lipase, Serum: 151 U/L (04-16-21 @ 06:46)  Amylase, Serum Total: 22 U/L (04-16-21 @ 06:46)  Lipase, Serum: 274 U/L (04-14-21 @ 16:23)          RADIOLOGY & ADDITIONAL TESTS:  NONE in past 24 hours      HEALTH ISSUES - PROBLEM Dx:  Acute pancreatitis    Diabetes mellitus  Insulin dependent    Autism spectrum disorder    Need for prophylactic measure    Dyslipidemia    Transaminitis          Consultant(s) Notes Reviewed:  [ x ] YES     Care Discussed with [ x ] Consultants, [ x ] Patient, [ x ] Family, [  ] HCP, [  ] , [  ] Social Service, [  ] RN, [  ] Physical Therapy, [  ] Palliative Care Team  DVT PPX: [ x ] Lovenox, [  ] SC Heparin, [  ] Coumadin, [  ] Xarelto, [  ] Eliquis, [  ] Pradaxa, [  ] IV Heparin drip, [  ] SCD, [  ] Ambulation, [  ] Contraindicated 2/2 GI Bleed, [  ] Contraindicated 2/2  Bleed, [  ] Contraindicated 2/2 Brain Bleed  Advanced Directive: [ x ] None, [  ] DNR/DNI Patient is a 21y old  Female who presents with a chief complaint of Acute pancreatitis (17 Apr 2021 10:12)      HPI:  Patient is a 22 [facility] with PMH of autism, chronic pancreatitis, IDDM type 1 (Insulin pump present), and PCOS presents to the ED c/o epigastric pain x 2 days. Patient states that her sx are similar sx to her episodes of chronic pancreatitis. Patient has frequent flare-ups of her pancreatitis, last being a few weeks ago and did not require hospital stay. Patient was scheduled for total pancreatectomy w/ islet cell transplantation into liver this past January, but the procedure was cancelled due to COVID pandemic. Denies ETOH use, no change in medications. Patient states that her pain is well controlled s/p morphine 4mg IV in the ED. Patient notes mild nausea, which is improved s/p zofran. Denies any fevers, chills, CP, SOB, vomiting, diarrhea, constipation, or back pain. Patient received Moderna 2 of 2, last dose ~1 week ago. No known recent COVID exposures.    In the ED  VS- Temp 97.9F,  -->102, /76, RR 18, SpO2 97% on RA  Labs significant for lipase WNL, lactate 2.2, glu 222, AST 84,   CT a/p (+) acute pancreatitis, underlying chronic pancreatitis, hepatic steatosis  ECG showed sinus tachycardia, no signs of acute ischemia  s/p 1L NS, morphine 4mg IV x2, zofran 4mg IV x1 in the ED (14 Apr 2021 19:23)      INTERVAL HPI:  4/15/21: Patient admitted overnight. Patient seen and examined. Patient tired from late admission. States abdominal pain is improving, now a 4-4.5/10 in epigastric region with radiation to back. States pain regimen and heating pads have been helping her pain. Endorses intermittent nausea. No gallstones seen on imaging, patient denies EtOH use, and lipid panel only mildly elevated. NPO with IVF. Endocrine consulted - Dr C perlman ,for insulin pump.    4/16/21: No acute overnight events. Patient seen and examined. Patient tired, limited ROS given fatigue/pain. Says her abdominal pain and nausea are still present. Patient still NPO and on IVF. GI consulted, pt can be DC'd once she can tolerate PO diet. Endocrine consulted for insulin pump - pt wishes to hold off omnipod, started on Lantus 8U qam and continue Admelog scale q6hr while NPO., On IV Fluids.    4/17/21: Patient seen and examined. Patient tired, and sleeping during interview. States that abdominal pain and nausea are still present. Denies vomiting. On IVF. Pain controlled with current pain regimen. GI advanced diet to clear liquid for lunch.    OVERNIGHT EVENTS: NONE    Home Medications:  Allegra 180 mg oral tablet: 1 tab(s) orally once a day (14 Apr 2021 20:20)  Cranberry oral capsule: 8400 milligram(s) orally once a day (14 Apr 2021 20:20)  Creon 36,000 units oral delayed release capsule: 2 cap(s) orally 3 times a day (14 Apr 2021 20:20)  Creon 36,000 units oral delayed release capsule: 1 cap(s) orally once a day, As Needed with snacks (14 Apr 2021 20:20)  famotidine 40 mg oral tablet: 1 tab(s) orally once a day (at bedtime) (14 Apr 2021 20:20)  Flonase 50 mcg/inh nasal spray: 1 spray(s) nasal once a day (14 Apr 2021 20:20)  gabapentin 300 mg oral tablet: 1 tab(s) orally 2 times a day (14 Apr 2021 20:20)  Junel 1/20 oral tablet: 1 tab(s) orally once a day (14 Apr 2021 20:20)  Lantus 100 units/mL subcutaneous solution: Dose dependent on Freestlye Candy Sensor-14 (14 Apr 2021 20:20)  MiraLax oral powder for reconstitution: 17 gram(s) orally once a day (14 Apr 2021 20:20)  Multiple Vitamins oral tablet: 1 tab(s) orally once a day (14 Apr 2021 20:20)  NovoLOG 100 units/mL injectable solution: Sliding scale (14 Apr 2021 20:20)  ursodiol 300 mg oral capsule: 1 cap(s) orally once a day (14 Apr 2021 20:20)  Vitamin B12 1000 mcg oral tablet: 1 tab(s) orally once a day (14 Apr 2021 20:20)  Vitamin C 500 mg oral tablet: 1 tab(s) orally once a day (14 Apr 2021 20:20)  Zofran 4 mg oral tablet: 1 tab(s) orally every 6 hours, As Needed (14 Apr 2021 20:20)      MEDICATIONS  (STANDING):  ascorbic acid 500 milliGRAM(s) Oral daily  cyanocobalamin 1000 MICROGram(s) Oral daily  dextrose 40% Gel 15 Gram(s) Oral once  dextrose 5% + sodium chloride 0.9%. 1000 milliLiter(s) (125 mL/Hr) IV Continuous <Continuous>  dextrose 5%. 1000 milliLiter(s) (50 mL/Hr) IV Continuous <Continuous>  dextrose 5%. 1000 milliLiter(s) (100 mL/Hr) IV Continuous <Continuous>  dextrose 50% Injectable 25 Gram(s) IV Push once  dextrose 50% Injectable 12.5 Gram(s) IV Push once  dextrose 50% Injectable 25 Gram(s) IV Push once  enoxaparin Injectable 40 milliGRAM(s) SubCutaneous daily  famotidine    Tablet 40 milliGRAM(s) Oral two times a day  gabapentin 300 milliGRAM(s) Oral two times a day  glucagon  Injectable 1 milliGRAM(s) IntraMuscular once  insulin lispro (ADMELOG) corrective regimen sliding scale   SubCutaneous every 6 hours  loratadine 10 milliGRAM(s) Oral daily  multivitamin 1 Tablet(s) Oral daily  pancrelipase  (CREON 36,000 Lipase Units) 2 Capsule(s) Oral three times a day with meals  polyethylene glycol 3350 17 Gram(s) Oral daily  ursodiol Capsule 300 milliGRAM(s) Oral <User Schedule>    MEDICATIONS  (PRN):  fluticasone propionate 50 MICROgram(s)/spray Nasal Spray 1 Spray(s) Both Nostrils two times a day PRN nasal congestion  morphine  - Injectable 2 milliGRAM(s) IV Push every 4 hours PRN Moderate Pain (4 - 6)  morphine  - Injectable 3 milliGRAM(s) IV Push every 4 hours PRN Severe Pain (7 - 10)  morphine  - Injectable 1 milliGRAM(s) IV Push every 3 hours PRN Mild Pain (1 - 3)  ondansetron    Tablet 4 milliGRAM(s) Oral every 6 hours PRN Nausea and/or Vomiting  pancrelipase  (CREON 36,000 Lipase Units) 1 Capsule(s) Oral daily PRN w/ snacks      No Known Allergies      Social History:  Denies any ETOH, tobacco, or illicit drug use  Lives in group home for ASD  Independent in all ADLs  Ambulates independently w/ no assistive device  Received Moderna 2 of 2  Does not recall LMP, ~few months ago, hx of PCOS (14 Apr 2021 19:23)      REVIEW OF SYSTEMS: Limited due to fatigue/pain  CONSTITUTIONAL: No fever, No chills, +Fatigue, No myalgia, No Body ache, No Weakness  EYES: No eye pain,  No visual disturbances, No discharge, NO Redness  ENMT:  No ear pain, No nose bleed, No vertigo; No sinus or throat pain, No Congestion  NECK: No pain, No stiffness  RESPIRATORY: No cough, wheezing, No  hemoptysis, No shortness of breath  CARDIOVASCULAR: No chest pain, palpitations  GASTROINTESTINAL: +Epigastric pain with radiation to the back. +Nausea, No vomiting; No diarrhea or constipation. [  ] BM  GENITOURINARY: No dysuria, No frequency, No urgency, No hematuria, or incontinence  NEUROLOGICAL: No headaches, No dizziness, No numbness, No tingling, No tremors, No weakness  EXT: No Swelling, No Pain, No Edema  SKIN:  [ x ] No itching, burning, rashes, or lesions   MUSCULOSKELETAL: No joint pain or swelling; No muscle pain, +Back pain, No extremity pain  PSYCHIATRIC: No depression, anxiety, mood swings or difficulty sleeping at night  PAIN SCALE: [  ] None  [ x ] Other-4/10  ROS Unable to obtain due to - [  ] Dementia  [  ] Lethargy  [  ] Sedated [  ] non verbal  REST OF REVIEW Of SYSTEM - [ x ] Normal     Vital Signs Last 24 Hrs  T(C): 36.4 (17 Apr 2021 05:11), Max: 36.9 (16 Apr 2021 21:15)  T(F): 97.6 (17 Apr 2021 05:11), Max: 98.4 (16 Apr 2021 21:15)  HR: 86 (17 Apr 2021 05:11) (75 - 109)  BP: 103/66 (17 Apr 2021 05:11) (103/66 - 108/67)  BP(mean): --  RR: 18 (17 Apr 2021 05:11) (17 - 18)  SpO2: 95% (17 Apr 2021 05:11) (95% - 95%)    CAPILLARY BLOOD GLUCOSE      POCT Blood Glucose.: 182 mg/dL (17 Apr 2021 11:50)  POCT Blood Glucose.: 251 mg/dL (17 Apr 2021 06:12)  POCT Blood Glucose.: 199 mg/dL (16 Apr 2021 23:56)  POCT Blood Glucose.: 179 mg/dL (16 Apr 2021 18:00)      I&O's Summary    PHYSICAL EXAM:  GENERAL:  [x  ] NAD , [ x ] well appearing, [  ] Agitated, [x  ] Drowsy,  [  ] Lethargy, [  ] confused   HEAD:  [ x ] Normal, [  ] Other  EYES:  [ x ] EOMI, [x  ] PERRLA, [x  ] conjunctiva and sclera clear normal, [  ] Other,  [  ] Pallor,[  ] Discharge  ENMT:  [x  ] Normal, [  x] dry  mucous membranes, [  ] Good dentition, [ x ] No Thrush  NECK:  [ x ] Supple, [x  ] No JVD, [ x ] Normal thyroid, [  ] Lymphadenopathy [  ] Other  CHEST/LUNG:  [x  ] Clear to auscultation bilaterally, [ x ] Breath Sounds equal B/L ,  [x  ] No rales, [ x ] No rhonchi  [x  ]  No wheezing  HEART:  [x  ] Regular rate and rhythm, [  ] tachycardia, [  ] Bradycardia,  [  ] irregular  [ x ] No murmurs, No rubs, No gallops, [  ] PPM in place (Mfr:  )  ABDOMEN:  [ x ] Soft, [ x ] Tender in epigastric region NO R/R/G , [x  ] Nondistended, [ x ] No mass, [ x ] Bowel sounds present, [ x ] obese  NERVOUS SYSTEM:  [x  ] Alert & Oriented X3, [ x ] Nonfocal  [  ] Confusion  [  ] Encephalopathic [  ] Sedated [  ] Unable to assess, [  ] Other-  EXTREMITIES: [x  ] 2+ Peripheral Pulses, No clubbing, No cyanosis,  [  ] edema B/L lower EXT. [  ] PVD stasis skin changes B/L Lower EXT  LYMPH: No lymphadenopathy noted  SKIN:  [x  ] No rashes or lesions, [  ] Pressure Ulcers, [  ] ecchymosis, [  ] Skin Tears, [  ] Other    DIET: Diet, Clear Liquid (04-17-21 @ 10:29)      LABS:                        11.8   4.84  )-----------( 256      ( 17 Apr 2021 05:38 )             36.1     17 Apr 2021 05:38    140    |  107    |  5      ----------------------------<  216    3.5     |  24     |  0.62     Ca    8.6        17 Apr 2021 05:38    TPro  7.3    /  Alb  2.8    /  TBili  0.3    /  DBili  x      /  AST  361    /  ALT  229    /  AlkPhos  82     17 Apr 2021 05:38        Lipase, Serum: 151 U/L (04-16-21 @ 06:46)  Amylase, Serum Total: 22 U/L (04-16-21 @ 06:46)  Lipase, Serum: 274 U/L (04-14-21 @ 16:23)          RADIOLOGY & ADDITIONAL TESTS:  NONE in past 24 hours      HEALTH ISSUES - PROBLEM Dx:  Acute pancreatitis    Diabetes mellitus  Insulin dependent    Autism spectrum disorder    Need for prophylactic measure    Dyslipidemia    Transaminitis          Consultant(s) Notes Reviewed:  [ x ] YES     Care Discussed with [ x ] Consultants, [ x ] Patient, [ x ] Family, [  ] HCP, [  ] , [  ] Social Service, [  ] RN, [  ] Physical Therapy, [  ] Palliative Care Team  DVT PPX: [ x ] Lovenox, [  ] SC Heparin, [  ] Coumadin, [  ] Xarelto, [  ] Eliquis, [  ] Pradaxa, [  ] IV Heparin drip, [  ] SCD, [  ] Ambulation, [  ] Contraindicated 2/2 GI Bleed, [  ] Contraindicated 2/2  Bleed, [  ] Contraindicated 2/2 Brain Bleed  Advanced Directive: [ x ] None, [  ] DNR/DNI Patient is a 21y old  Female who presents with a chief complaint of Acute pancreatitis (17 Apr 2021 10:12)      HPI:  Patient is a 20 [facility] female [facility] [place] with PMH of autism, chronic pancreatitis, IDDM type 1 (Insulin pump present), and PCOS presents to the ED c/o epigastric pain x 2 days. Patient states that her sx are similar sx to her episodes of chronic pancreatitis. Patient has frequent flare-ups of her pancreatitis, last being a few weeks ago and did not require hospital stay. Patient was scheduled for total pancreatectomy w/ islet cell transplantation into liver this past January, but the procedure was cancelled due to COVID pandemic. Denies ETOH use, no change in medications. Patient states that her pain is well controlled s/p morphine 4mg IV in the ED. Patient notes mild nausea, which is improved s/p zofran. Denies any fevers, chills, CP, SOB, vomiting, diarrhea, constipation, or back pain. Patient received Moderna 2 of 2, last dose ~1 week ago. No known recent COVID exposures.    In the ED  VS- Temp 97.9F,  -->102, /76, RR 18, SpO2 97% on RA  Labs significant for lipase WNL, lactate 2.2, glu 222, AST 84,   CT a/p (+) acute pancreatitis, underlying chronic pancreatitis, hepatic steatosis  ECG showed sinus tachycardia, no signs of acute ischemia  s/p 1L NS, morphine 4mg IV x2, zofran 4mg IV x1 in the ED (14 Apr 2021 19:23)      INTERVAL HPI:  4/15/21: Patient admitted overnight. Patient seen and examined. Patient tired from late admission. States abdominal pain is improving, now a 4-4.5/10 in epigastric region with radiation to back. States pain regimen and heating pads have been helping her pain. Endorses intermittent nausea. No gallstones seen on imaging, patient denies EtOH use, and lipid panel only mildly elevated. NPO with IVF. Endocrine consulted - Dr C perlman ,for insulin pump.    4/16/21: No acute overnight events. Patient seen and examined. Patient tired, limited ROS given fatigue/pain. Says her abdominal pain and nausea are still present. Patient still NPO and on IVF. GI consulted, pt can be DC'd once she can tolerate PO diet. Endocrine consulted for insulin pump - pt wishes to hold off omnipod, started on Lantus 8U qam and continue Admelog scale q6hr while NPO., On IV Fluids.    4/17/21: Patient seen and examined. Patient tired, and sleeping during interview. States that abdominal pain and nausea are still present. Denies vomiting. On IVF. Pain controlled with current pain regimen. GI advanced diet to clear liquid for Diet. LFT's increasing.    OVERNIGHT EVENTS: NONE    Home Medications:  Allegra 180 mg oral tablet: 1 tab(s) orally once a day (14 Apr 2021 20:20)  Cranberry oral capsule: 8400 milligram(s) orally once a day (14 Apr 2021 20:20)  Creon 36,000 units oral delayed release capsule: 2 cap(s) orally 3 times a day (14 Apr 2021 20:20)  Creon 36,000 units oral delayed release capsule: 1 cap(s) orally once a day, As Needed with snacks (14 Apr 2021 20:20)  famotidine 40 mg oral tablet: 1 tab(s) orally once a day (at bedtime) (14 Apr 2021 20:20)  Flonase 50 mcg/inh nasal spray: 1 spray(s) nasal once a day (14 Apr 2021 20:20)  gabapentin 300 mg oral tablet: 1 tab(s) orally 2 times a day (14 Apr 2021 20:20)  Junel 1/20 oral tablet: 1 tab(s) orally once a day (14 Apr 2021 20:20)  Lantus 100 units/mL subcutaneous solution: Dose dependent on Freestly[employer] Candy Sensor-14 (14 Apr 2021 20:20)  MiraLax oral powder for reconstitution: 17 gram(s) orally once a day (14 Apr 2021 20:20)  Multiple Vitamins oral tablet: 1 tab(s) orally once a day (14 Apr 2021 20:20)  NovoLOG 100 units/mL injectable solution: Sliding scale (14 Apr 2021 20:20)  ursodiol 300 mg oral capsule: 1 cap(s) orally once a day (14 Apr 2021 20:20)  Vitamin B12 1000 mcg oral tablet: 1 tab(s) orally once a day (14 Apr 2021 20:20)  Vitamin C 500 mg oral tablet: 1 tab(s) orally once a day (14 Apr 2021 20:20)  Zofran 4 mg oral tablet: 1 tab(s) orally every 6 hours, As Needed (14 Apr 2021 20:20)      MEDICATIONS  (STANDING):  ascorbic acid 500 milliGRAM(s) Oral daily  cyanocobalamin 1000 MICROGram(s) Oral daily  dextrose 40% Gel 15 Gram(s) Oral once  dextrose 5% + sodium chloride 0.9%. 1000 milliLiter(s) (125 mL/Hr) IV Continuous <Continuous>  dextrose 5%. 1000 milliLiter(s) (50 mL/Hr) IV Continuous <Continuous>  dextrose 5%. 1000 milliLiter(s) (100 mL/Hr) IV Continuous <Continuous>  dextrose 50% Injectable 25 Gram(s) IV Push once  dextrose 50% Injectable 12.5 Gram(s) IV Push once  dextrose 50% Injectable 25 Gram(s) IV Push once  enoxaparin Injectable 40 milliGRAM(s) SubCutaneous daily  famotidine    Tablet 40 milliGRAM(s) Oral two times a day  gabapentin 300 milliGRAM(s) Oral two times a day  glucagon  Injectable 1 milliGRAM(s) IntraMuscular once  insulin lispro (ADMELOG) corrective regimen sliding scale   SubCutaneous every 6 hours  loratadine 10 milliGRAM(s) Oral daily  multivitamin 1 Tablet(s) Oral daily  pancrelipase  (CREON 36,000 Lipase Units) 2 Capsule(s) Oral three times a day with meals  polyethylene glycol 3350 17 Gram(s) Oral daily  ursodiol Capsule 300 milliGRAM(s) Oral <User Schedule>    MEDICATIONS  (PRN):  fluticasone propionate 50 MICROgram(s)/spray Nasal Spray 1 Spray(s) Both Nostrils two times a day PRN nasal congestion  morphine  - Injectable 2 milliGRAM(s) IV Push every 4 hours PRN Moderate Pain (4 - 6)  morphine  - Injectable 3 milliGRAM(s) IV Push every 4 hours PRN Severe Pain (7 - 10)  morphine  - Injectable 1 milliGRAM(s) IV Push every 3 hours PRN Mild Pain (1 - 3)  ondansetron    Tablet 4 milliGRAM(s) Oral every 6 hours PRN Nausea and/or Vomiting  pancrelipase  (CREON 36,000 Lipase Units) 1 Capsule(s) Oral daily PRN w/ snacks      No Known Allergies      Social History:  Denies any ETOH, tobacco, or illicit drug use  Lives in group home for ASD  Independent in all ADLs  Ambulates independently w/ no assistive device  Received Moderna 2 of 2  Does not recall LMP, ~few months ago, hx of PCOS (14 Apr 2021 19:23)      REVIEW OF SYSTEMS: Limited due to fatigue/pain  CONSTITUTIONAL: No fever, No chills, +Fatigue, No myalgia, No Body ache, No Weakness  EYES: No eye pain,  No visual disturbances, No discharge, NO Redness  ENMT:  No ear pain, No nose bleed, No vertigo; No sinus or throat pain, No Congestion  NECK: No pain, No stiffness  RESPIRATORY: No cough, wheezing, No  hemoptysis, No shortness of breath  CARDIOVASCULAR: No chest pain, palpitations  GASTROINTESTINAL: +Epigastric pain with radiation to the back. +Nausea, No vomiting; No diarrhea or constipation. [  ] BM  GENITOURINARY: No dysuria, No frequency, No urgency, No hematuria, or incontinence  NEUROLOGICAL: No headaches, No dizziness, No numbness, No tingling, No tremors, No weakness  EXT: No Swelling, No Pain, No Edema  SKIN:  [ x ] No itching, burning, rashes, or lesions   MUSCULOSKELETAL: No joint pain or swelling; No muscle pain, +Back pain, No extremity pain  PSYCHIATRIC: No depression, anxiety, mood swings or difficulty sleeping at night  PAIN SCALE: [  ] None  [ x ] Other-4/10  ROS Unable to obtain due to - [  ] Dementia  [  ] Lethargy  [  ] Sedated [  ] non verbal  REST OF REVIEW Of SYSTEM - [ x ] Normal     Vital Signs Last 24 Hrs  T(C): 36.4 (17 Apr 2021 05:11), Max: 36.9 (16 Apr 2021 21:15)  T(F): 97.6 (17 Apr 2021 05:11), Max: 98.4 (16 Apr 2021 21:15)  HR: 86 (17 Apr 2021 05:11) (75 - 109)  BP: 103/66 (17 Apr 2021 05:11) (103/66 - 108/67)  BP(mean): --  RR: 18 (17 Apr 2021 05:11) (17 - 18)  SpO2: 95% (17 Apr 2021 05:11) (95% - 95%)    CAPILLARY BLOOD GLUCOSE      POCT Blood Glucose.: 182 mg/dL (17 Apr 2021 11:50)  POCT Blood Glucose.: 251 mg/dL (17 Apr 2021 06:12)  POCT Blood Glucose.: 199 mg/dL (16 Apr 2021 23:56)  POCT Blood Glucose.: 179 mg/dL (16 Apr 2021 18:00)      I&O's Summary    PHYSICAL EXAM:  GENERAL:  [x  ] NAD , [ x ] well appearing, [  ] Agitated, [x  ] Drowsy,  [  ] Lethargy, [  ] confused   HEAD:  [ x ] Normal, [  ] Other  EYES:  [ x ] EOMI, [x  ] PERRLA, [x  ] conjunctiva and sclera clear normal, [  ] Other,  [  ] Pallor,[  ] Discharge  ENMT:  [x  ] Normal, [  x] dry  mucous membranes, [  ] Good dentition, [ x ] No Thrush  NECK:  [ x ] Supple, [x  ] No JVD, [ x ] Normal thyroid, [  ] Lymphadenopathy [  ] Other  CHEST/LUNG:  [x  ] Clear to auscultation bilaterally, [ x ] Breath Sounds equal B/L ,  [x  ] No rales, [ x ] No rhonchi  [x  ]  No wheezing  HEART:  [x  ] Regular rate and rhythm, [  ] tachycardia, [  ] Bradycardia,  [  ] irregular  [ x ] No murmurs, No rubs, No gallops, [  ] PPM in place (Mfr:  )  ABDOMEN:  [ x ] Soft, [ x ] Tender in epigastric region NO R/R/G , [x  ] Nondistended, [ x ] No mass, [ x ] Bowel sounds present, [ x ] obese  NERVOUS SYSTEM:  [x  ] Alert & Oriented X3, [ x ] Nonfocal  [  ] Confusion  [  ] Encephalopathic [  ] Sedated [  ] Unable to assess, [  ] Other-  EXTREMITIES: [x  ] 2+ Peripheral Pulses, No clubbing, No cyanosis,  [  ] edema B/L lower EXT. [  ] PVD stasis skin changes B/L Lower EXT  LYMPH: No lymphadenopathy noted  SKIN:  [x  ] No rashes or lesions, [  ] Pressure Ulcers, [  ] ecchymosis, [  ] Skin Tears, [  ] Other    DIET: Diet, Clear Liquid (04-17-21 @ 10:29)      LABS:                        11.8   4.84  )-----------( 256      ( 17 Apr 2021 05:38 )             36.1     17 Apr 2021 05:38    140    |  107    |  5      ----------------------------<  216    3.5     |  24     |  0.62     Ca    8.6        17 Apr 2021 05:38    TPro  7.3    /  Alb  2.8    /  TBili  0.3    /  DBili  x      /  AST  361    /  ALT  229    /  AlkPhos  82     17 Apr 2021 05:38        Lipase, Serum: 151 U/L (04-16-21 @ 06:46)  Amylase, Serum Total: 22 U/L (04-16-21 @ 06:46)  Lipase, Serum: 274 U/L (04-14-21 @ 16:23)      RADIOLOGY & ADDITIONAL TESTS:  NONE in past 24 hours      HEALTH ISSUES - PROBLEM Dx:  Acute pancreatitis    Diabetes mellitus  Insulin dependent    Autism spectrum disorder    Need for prophylactic measure    Dyslipidemia    Transaminitis      Consultant(s) Notes Reviewed:  [ x ] YES     Care Discussed with [ x ] Consultants, [ x ] Patient, [ x ] Family,- MOM -Jennifer [  ] HCP, [  ] , [  ] Social Service, [x ] RN, [  ] Physical Therapy, [  ] Palliative Care Team  DVT PPX: [ x ] Lovenox, [  ] SC Heparin, [  ] Coumadin, [  ] Xarelto, [  ] Eliquis, [  ] Pradaxa, [  ] IV Heparin drip, [  ] SCD, [  ] Ambulation, [  ] Contraindicated 2/2 GI Bleed, [  ] Contraindicated 2/2  Bleed, [  ] Contraindicated 2/2 Brain Bleed  Advanced Directive: [ x ] None, [  ] DNR/DNI

## 2021-04-17 NOTE — PROGRESS NOTE ADULT - NSICDXPROBLEMPLAN2_GEN_ALL_CORE_FT
Hx of IDDM1, insulin pump present  -Dr. Perlman consulted: pt wishes to hold off omnipod. Continue Lantus 8U qam, cont admelog scale q6hr while NPO  -Continue D5/NS   -Hypoglycemia protocol

## 2021-04-17 NOTE — DIETITIAN INITIAL EVALUATION ADULT. - OTHER INFO
20 yo female from long-term with PMH of autism, chronic pancreatitis, IDDM type 1, and PCOS presents to the ED c/o epigastric pain x past several days admitted for acute pancreatitis. US abd ordered for today.  DM managed with insulin pump.  Pt recently transitioned to living in a group home.  Pt asleep, not disturbed.  Awaiting phone call from pt's mother to obtain collateral hx. 22 yo female from assisted with PMH of autism, chronic pancreatitis, IDDM type 1, and PCOS presents to the ED c/o epigastric pain x past several days admitted for acute pancreatitis. US abd ordered for today.  DM managed with insulin pump.  Pt recently transitioned to living in a group home.  Pt asleep, not disturbed.  Collateral hx obtained from mother.  Pt eats a vegetarian diet PTA, eats low to no fat due to PMH.  Will eat tilapia or other low fat white fish and tofu. Also eats egg whites. No meat or dairy though. Mother suspects pt was eating more than she is supposed to and likely eating the wrong foods which prompted this admission.

## 2021-04-17 NOTE — PROGRESS NOTE ADULT - SUBJECTIVE AND OBJECTIVE BOX
CAPILLARY BLOOD GLUCOSE      POCT Blood Glucose.: 251 mg/dL (17 Apr 2021 06:12)  POCT Blood Glucose.: 199 mg/dL (16 Apr 2021 23:56)  POCT Blood Glucose.: 179 mg/dL (16 Apr 2021 18:00)  POCT Blood Glucose.: 226 mg/dL (16 Apr 2021 11:07)      Vital Signs Last 24 Hrs  T(C): 36.4 (17 Apr 2021 05:11), Max: 36.9 (16 Apr 2021 21:15)  T(F): 97.6 (17 Apr 2021 05:11), Max: 98.4 (16 Apr 2021 21:15)  HR: 86 (17 Apr 2021 05:11) (75 - 109)  BP: 103/66 (17 Apr 2021 05:11) (103/66 - 108/67)  BP(mean): --  RR: 18 (17 Apr 2021 05:11) (17 - 18)  SpO2: 95% (17 Apr 2021 05:11) (95% - 95%)    General: WN/WD NAD  Respiratory: CTA B/L  CV: RRR, S1S2, no murmurs, rubs or gallops  Abdominal: Soft, NT, ND +BS, Last BM  Extremities: No edema, + peripheral pulses     04-17    140  |  107  |  5<L>  ----------------------------<  216<H>  3.5   |  24  |  0.62    Ca    8.6      17 Apr 2021 05:38    TPro  7.3  /  Alb  2.8<L>  /  TBili  0.3  /  DBili  x   /  AST  361<H>  /  ALT  229<H>  /  AlkPhos  82  04-17      dextrose 40% Gel 15 Gram(s) Oral once  dextrose 50% Injectable 25 Gram(s) IV Push once  dextrose 50% Injectable 12.5 Gram(s) IV Push once  dextrose 50% Injectable 25 Gram(s) IV Push once  glucagon  Injectable 1 milliGRAM(s) IntraMuscular once  insulin glargine Injectable (LANTUS) 8 Unit(s) SubCutaneous every morning  insulin lispro (ADMELOG) corrective regimen sliding scale   SubCutaneous every 6 hours

## 2021-04-17 NOTE — PROGRESS NOTE ADULT - NSICDXPROBLEMPLAN1_GEN_ALL_CORE_FT
Patient w/ hx of recurrent chronic pancreatitis p/w epigastric pain, found to have acute pancreatitis w/ underlying chronic pancreatitis   ?Etiology   on CT a/p shows Ac Pancreatitis , No GB stones  -Does not meet SIRS criteria  -Denies ETOH use, no change in medications, lipid panel with only mild elevation  -Continue with D5/NS   -Pain control w/ Morphine 1 mg IV for Mild / morphine 2mg IV q4 for moderate, 3mg q4 for severe pain  -Zofran PRN nausea  -advance to clear liquids today per GI, On Pancreatic enzyme supplement  -GI consulted, recs appreciated

## 2021-04-17 NOTE — PROGRESS NOTE ADULT - NSICDXPROBLEMPLAN5_GEN_ALL_CORE_FT
- LFTs uptrending  - Will check RUQ US  - Will trend  - GI consulted - LFTs uptrending  - Will check RUQ US  - Will trend  - GI consulted- Dr Carrasco follow up

## 2021-04-17 NOTE — PROGRESS NOTE ADULT - SUBJECTIVE AND OBJECTIVE BOX
INTERVAL HPI/OVERNIGHT EVENTS:  Pt seen and examined   No acute overnight eventsas per  nursing staff         MEDICATIONS  (STANDING):  ascorbic acid 500 milliGRAM(s) Oral daily  cyanocobalamin 1000 MICROGram(s) Oral daily  dextrose 40% Gel 15 Gram(s) Oral once  dextrose 5% + sodium chloride 0.9%. 1000 milliLiter(s) (125 mL/Hr) IV Continuous <Continuous>  dextrose 5%. 1000 milliLiter(s) (50 mL/Hr) IV Continuous <Continuous>  dextrose 5%. 1000 milliLiter(s) (100 mL/Hr) IV Continuous <Continuous>  dextrose 50% Injectable 25 Gram(s) IV Push once  dextrose 50% Injectable 12.5 Gram(s) IV Push once  dextrose 50% Injectable 25 Gram(s) IV Push once  enoxaparin Injectable 40 milliGRAM(s) SubCutaneous daily  famotidine    Tablet 40 milliGRAM(s) Oral two times a day  gabapentin 300 milliGRAM(s) Oral two times a day  glucagon  Injectable 1 milliGRAM(s) IntraMuscular once  insulin lispro (ADMELOG) corrective regimen sliding scale   SubCutaneous every 6 hours  loratadine 10 milliGRAM(s) Oral daily  multivitamin 1 Tablet(s) Oral daily  pancrelipase  (CREON 36,000 Lipase Units) 2 Capsule(s) Oral three times a day with meals  polyethylene glycol 3350 17 Gram(s) Oral daily  ursodiol Capsule 300 milliGRAM(s) Oral <User Schedule>    MEDICATIONS  (PRN):  fluticasone propionate 50 MICROgram(s)/spray Nasal Spray 1 Spray(s) Both Nostrils two times a day PRN nasal congestion  morphine  - Injectable 2 milliGRAM(s) IV Push every 4 hours PRN Moderate Pain (4 - 6)  morphine  - Injectable 3 milliGRAM(s) IV Push every 4 hours PRN Severe Pain (7 - 10)  morphine  - Injectable 1 milliGRAM(s) IV Push every 3 hours PRN Mild Pain (1 - 3)  ondansetron    Tablet 4 milliGRAM(s) Oral every 6 hours PRN Nausea and/or Vomiting  pancrelipase  (CREON 36,000 Lipase Units) 1 Capsule(s) Oral daily PRN w/ snacks      Allergies    No Known Allergies    Intolerances        ROS:   General:  No wt loss, fevers, chills, night sweats, fatigue,   Eyes:  Good vision, no reported pain  ENT:  No sore throat, pain, runny nose, dysphagia  CV:  No pain, palpitations, hypo/hypertension  Resp:  No dyspnea, cough, tachypnea, wheezing  GI:  No pain, No nausea, No vomiting, No diarrhea, No constipation, No weight loss, No fever, No pruritis, No rectal bleeding, No tarry stools, No dysphagia,  :  No pain, bleeding, incontinence, nocturia  Muscle:  No pain, weakness  Neuro:  No weakness, tingling, memory problems  Psych:  No fatigue, insomnia, mood problems, depression  Endocrine:  No polyuria, polydipsia, cold/heat intolerance  Heme:  No petechiae, ecchymosis, easy bruisability  Skin:  No rash, tattoos, scars, edema      PHYSICAL EXAM:   Vital Signs:  Vital Signs Last 24 Hrs  T(C): 36.4 (17 Apr 2021 05:11), Max: 36.9 (16 Apr 2021 21:15)  T(F): 97.6 (17 Apr 2021 05:11), Max: 98.4 (16 Apr 2021 21:15)  HR: 86 (17 Apr 2021 05:11) (75 - 109)  BP: 103/66 (17 Apr 2021 05:11) (103/66 - 108/67)  BP(mean): --  RR: 18 (17 Apr 2021 05:11) (17 - 18)  SpO2: 95% (17 Apr 2021 05:11) (95% - 95%)  Daily     Daily     GENERAL:  Appears stated age, well-groomed, well-nourished, no distress  HEENT:  NC/AT,  conjunctivae clear and pink, no thyromegaly, nodules, adenopathy, no JVD, sclera -anicteric  CHEST:  Full & symmetric excursion, no increased effort, breath sounds clear  HEART:  Regular rhythm, S1, S2, no murmur/rub/S3/S4, no abdominal bruit, no edema  ABDOMEN:  Soft, non-tender, non-distended, normoactive bowel sounds,  no masses ,no hepato-splenomegaly, no signs of chronic liver disease  EXTEREMITIES:  no cyanosis,clubbing or edema  SKIN:  No rash/erythema/ecchymoses/petechiae/wounds/abscess/warm/dry  NEURO:  Alert, oriented, no asterixis, no tremor, no encephalopathy      LABS:                        11.8   4.84  )-----------( 256      ( 17 Apr 2021 05:38 )             36.1     04-17    140  |  107  |  5<L>  ----------------------------<  216<H>  3.5   |  24  |  0.62    Ca    8.6      17 Apr 2021 05:38    TPro  7.3  /  Alb  2.8<L>  /  TBili  0.3  /  DBili  x   /  AST  361<H>  /  ALT  229<H>  /  AlkPhos  82  04-17          RADIOLOGY & ADDITIONAL TESTS:   INTERVAL HPI/OVERNIGHT EVENTS:  Pt seen and examined   No acute overnight eventsas per  nursing staff   Pain improving         MEDICATIONS  (STANDING):  ascorbic acid 500 milliGRAM(s) Oral daily  cyanocobalamin 1000 MICROGram(s) Oral daily  dextrose 40% Gel 15 Gram(s) Oral once  dextrose 5% + sodium chloride 0.9%. 1000 milliLiter(s) (125 mL/Hr) IV Continuous <Continuous>  dextrose 5%. 1000 milliLiter(s) (50 mL/Hr) IV Continuous <Continuous>  dextrose 5%. 1000 milliLiter(s) (100 mL/Hr) IV Continuous <Continuous>  dextrose 50% Injectable 25 Gram(s) IV Push once  dextrose 50% Injectable 12.5 Gram(s) IV Push once  dextrose 50% Injectable 25 Gram(s) IV Push once  enoxaparin Injectable 40 milliGRAM(s) SubCutaneous daily  famotidine    Tablet 40 milliGRAM(s) Oral two times a day  gabapentin 300 milliGRAM(s) Oral two times a day  glucagon  Injectable 1 milliGRAM(s) IntraMuscular once  insulin lispro (ADMELOG) corrective regimen sliding scale   SubCutaneous every 6 hours  loratadine 10 milliGRAM(s) Oral daily  multivitamin 1 Tablet(s) Oral daily  pancrelipase  (CREON 36,000 Lipase Units) 2 Capsule(s) Oral three times a day with meals  polyethylene glycol 3350 17 Gram(s) Oral daily  ursodiol Capsule 300 milliGRAM(s) Oral <User Schedule>    MEDICATIONS  (PRN):  fluticasone propionate 50 MICROgram(s)/spray Nasal Spray 1 Spray(s) Both Nostrils two times a day PRN nasal congestion  morphine  - Injectable 2 milliGRAM(s) IV Push every 4 hours PRN Moderate Pain (4 - 6)  morphine  - Injectable 3 milliGRAM(s) IV Push every 4 hours PRN Severe Pain (7 - 10)  morphine  - Injectable 1 milliGRAM(s) IV Push every 3 hours PRN Mild Pain (1 - 3)  ondansetron    Tablet 4 milliGRAM(s) Oral every 6 hours PRN Nausea and/or Vomiting  pancrelipase  (CREON 36,000 Lipase Units) 1 Capsule(s) Oral daily PRN w/ snacks      Allergies    No Known Allergies    Intolerances        ROS:   General:  No wt loss, fevers, chills, night sweats, fatigue,   Eyes:  Good vision, no reported pain  ENT:  No sore throat, pain, runny nose, dysphagia  CV:  No pain, palpitations, hypo/hypertension  Resp:  No dyspnea, cough, tachypnea, wheezing  GI:  No pain, No nausea, No vomiting, No diarrhea, No constipation, No weight loss, No fever, No pruritis, No rectal bleeding, No tarry stools, No dysphagia,  :  No pain, bleeding, incontinence, nocturia  Muscle:  No pain, weakness  Neuro:  No weakness, tingling, memory problems  Psych:  No fatigue, insomnia, mood problems, depression  Endocrine:  No polyuria, polydipsia, cold/heat intolerance  Heme:  No petechiae, ecchymosis, easy bruisability  Skin:  No rash, tattoos, scars, edema      PHYSICAL EXAM:   Vital Signs:  Vital Signs Last 24 Hrs  T(C): 36.4 (17 Apr 2021 05:11), Max: 36.9 (16 Apr 2021 21:15)  T(F): 97.6 (17 Apr 2021 05:11), Max: 98.4 (16 Apr 2021 21:15)  HR: 86 (17 Apr 2021 05:11) (75 - 109)  BP: 103/66 (17 Apr 2021 05:11) (103/66 - 108/67)  BP(mean): --  RR: 18 (17 Apr 2021 05:11) (17 - 18)  SpO2: 95% (17 Apr 2021 05:11) (95% - 95%)  Daily     Daily     GENERAL:  Appears stated age, well-groomed, well-nourished, no distress  HEENT:  NC/AT,  conjunctivae clear and pink, no thyromegaly, nodules, adenopathy, no JVD, sclera -anicteric  CHEST:  Full & symmetric excursion, no increased effort, breath sounds clear  HEART:  Regular rhythm, S1, S2, no murmur/rub/S3/S4, no abdominal bruit, no edema  ABDOMEN:  Soft, non-tender, non-distended, normoactive bowel sounds,  no masses ,no hepato-splenomegaly, no signs of chronic liver disease  EXTEREMITIES:  no cyanosis,clubbing or edema  SKIN:  No rash/erythema/ecchymoses/petechiae/wounds/abscess/warm/dry  NEURO:  Alert, oriented, no asterixis, no tremor, no encephalopathy      LABS:                        11.8   4.84  )-----------( 256      ( 17 Apr 2021 05:38 )             36.1     04-17    140  |  107  |  5<L>  ----------------------------<  216<H>  3.5   |  24  |  0.62    Ca    8.6      17 Apr 2021 05:38    TPro  7.3  /  Alb  2.8<L>  /  TBili  0.3  /  DBili  x   /  AST  361<H>  /  ALT  229<H>  /  AlkPhos  82  04-17          RADIOLOGY & ADDITIONAL TESTS:

## 2021-04-17 NOTE — DIETITIAN INITIAL EVALUATION ADULT. - PERTINENT MEDS FT
Lovenox, D5+NaCl 125cc/hr, Lispro coverage, VitC, B12, Pepcid, Morphine, MVI, Zofran, Creon, Miralax

## 2021-04-17 NOTE — PROGRESS NOTE ADULT - ATTENDING COMMENTS
Patient is a 22 yo female from [facility] with PMH of autism, chronic pancreatitis, IDDM type 1 presents to the ED c/o epigastric pain x past several days admitted for acute pancreatitis.  Pt seen, examined, case & care plan d/w pt, residents at detail.  D/W GI- DR Ladd , continue current care, pt can have ice chips   IV Fluids, Clear liquid diet as per GI  D/W TARI Rodriguez on phone at detail.   Total care time is 40 minutes.

## 2021-04-17 NOTE — PROGRESS NOTE ADULT - ASSESSMENT
Patient is a 22 yo female from care home with PMH of autism, chronic pancreatitis, IDDM type 1 presents to the ED c/o epigastric pain x past several days admitted for acute pancreatitis

## 2021-04-18 LAB
ALBUMIN SERPL ELPH-MCNC: 2.8 G/DL — LOW (ref 3.3–5)
ALP SERPL-CCNC: 83 U/L — SIGNIFICANT CHANGE UP (ref 40–120)
ALT FLD-CCNC: 286 U/L — HIGH (ref 12–78)
ANION GAP SERPL CALC-SCNC: 8 MMOL/L — SIGNIFICANT CHANGE UP (ref 5–17)
AST SERPL-CCNC: 323 U/L — HIGH (ref 15–37)
BILIRUB DIRECT SERPL-MCNC: 0.2 MG/DL — SIGNIFICANT CHANGE UP (ref 0.05–0.2)
BILIRUB INDIRECT FLD-MCNC: 0.2 MG/DL — SIGNIFICANT CHANGE UP (ref 0.2–1)
BILIRUB SERPL-MCNC: 0.4 MG/DL — SIGNIFICANT CHANGE UP (ref 0.2–1.2)
BUN SERPL-MCNC: 5 MG/DL — LOW (ref 7–23)
CALCIUM SERPL-MCNC: 8.7 MG/DL — SIGNIFICANT CHANGE UP (ref 8.5–10.1)
CHLORIDE SERPL-SCNC: 108 MMOL/L — SIGNIFICANT CHANGE UP (ref 96–108)
CO2 SERPL-SCNC: 24 MMOL/L — SIGNIFICANT CHANGE UP (ref 22–31)
CREAT SERPL-MCNC: 0.68 MG/DL — SIGNIFICANT CHANGE UP (ref 0.5–1.3)
GLUCOSE SERPL-MCNC: 243 MG/DL — HIGH (ref 70–99)
HCT VFR BLD CALC: 36.2 % — SIGNIFICANT CHANGE UP (ref 34.5–45)
HGB BLD-MCNC: 12 G/DL — SIGNIFICANT CHANGE UP (ref 11.5–15.5)
MCHC RBC-ENTMCNC: 29.6 PG — SIGNIFICANT CHANGE UP (ref 27–34)
MCHC RBC-ENTMCNC: 33.1 GM/DL — SIGNIFICANT CHANGE UP (ref 32–36)
MCV RBC AUTO: 89.4 FL — SIGNIFICANT CHANGE UP (ref 80–100)
NRBC # BLD: 0 /100 WBCS — SIGNIFICANT CHANGE UP (ref 0–0)
PLATELET # BLD AUTO: 255 K/UL — SIGNIFICANT CHANGE UP (ref 150–400)
POTASSIUM SERPL-MCNC: 3.6 MMOL/L — SIGNIFICANT CHANGE UP (ref 3.5–5.3)
POTASSIUM SERPL-SCNC: 3.6 MMOL/L — SIGNIFICANT CHANGE UP (ref 3.5–5.3)
PROT SERPL-MCNC: 7.2 G/DL — SIGNIFICANT CHANGE UP (ref 6–8.3)
RBC # BLD: 4.05 M/UL — SIGNIFICANT CHANGE UP (ref 3.8–5.2)
RBC # FLD: 12.1 % — SIGNIFICANT CHANGE UP (ref 10.3–14.5)
SODIUM SERPL-SCNC: 140 MMOL/L — SIGNIFICANT CHANGE UP (ref 135–145)
WBC # BLD: 4.07 K/UL — SIGNIFICANT CHANGE UP (ref 3.8–10.5)
WBC # FLD AUTO: 4.07 K/UL — SIGNIFICANT CHANGE UP (ref 3.8–10.5)

## 2021-04-18 RX ORDER — POTASSIUM CHLORIDE 20 MEQ
40 PACKET (EA) ORAL ONCE
Refills: 0 | Status: COMPLETED | OUTPATIENT
Start: 2021-04-18 | End: 2021-04-18

## 2021-04-18 RX ADMIN — PREGABALIN 1000 MICROGRAM(S): 225 CAPSULE ORAL at 12:27

## 2021-04-18 RX ADMIN — GABAPENTIN 300 MILLIGRAM(S): 400 CAPSULE ORAL at 17:41

## 2021-04-18 RX ADMIN — GABAPENTIN 300 MILLIGRAM(S): 400 CAPSULE ORAL at 06:17

## 2021-04-18 RX ADMIN — Medication 2: at 12:36

## 2021-04-18 RX ADMIN — Medication 500 MILLIGRAM(S): at 12:17

## 2021-04-18 RX ADMIN — SODIUM CHLORIDE 125 MILLILITER(S): 9 INJECTION, SOLUTION INTRAVENOUS at 06:48

## 2021-04-18 RX ADMIN — Medication 40 MILLIEQUIVALENT(S): at 17:41

## 2021-04-18 RX ADMIN — Medication 2: at 19:42

## 2021-04-18 RX ADMIN — Medication 3: at 23:14

## 2021-04-18 RX ADMIN — Medication 1 TABLET(S): at 12:17

## 2021-04-18 RX ADMIN — MORPHINE SULFATE 1 MILLIGRAM(S): 50 CAPSULE, EXTENDED RELEASE ORAL at 01:37

## 2021-04-18 RX ADMIN — URSODIOL 300 MILLIGRAM(S): 250 TABLET, FILM COATED ORAL at 06:18

## 2021-04-18 RX ADMIN — FAMOTIDINE 40 MILLIGRAM(S): 10 INJECTION INTRAVENOUS at 06:18

## 2021-04-18 RX ADMIN — Medication 2: at 06:18

## 2021-04-18 RX ADMIN — FAMOTIDINE 40 MILLIGRAM(S): 10 INJECTION INTRAVENOUS at 17:42

## 2021-04-18 RX ADMIN — LORATADINE 10 MILLIGRAM(S): 10 TABLET ORAL at 12:17

## 2021-04-18 RX ADMIN — ENOXAPARIN SODIUM 40 MILLIGRAM(S): 100 INJECTION SUBCUTANEOUS at 12:15

## 2021-04-18 RX ADMIN — MORPHINE SULFATE 2 MILLIGRAM(S): 50 CAPSULE, EXTENDED RELEASE ORAL at 12:15

## 2021-04-18 RX ADMIN — Medication 2: at 00:10

## 2021-04-18 NOTE — PROGRESS NOTE ADULT - NSICDXPROBLEMPLAN2_GEN_ALL_CORE_FT
Hx of IDDM1, insulin pump present  -Dr. Perlman consulted: pt wishes to hold off omnipod. Continue Lantus 10U qam, cont admelog scale q6hr as pt barely eating clear liquid diet  -Continue D5/NS   -Hypoglycemia protocol

## 2021-04-18 NOTE — PROGRESS NOTE ADULT - ASSESSMENT
Patient is a 22 yo female from [facility] with PMH of autism, chronic pancreatitis, IDDM type 1 presents to the ED c/o epigastric pain x past several days admitted for acute pancreatitis.

## 2021-04-18 NOTE — PROGRESS NOTE ADULT - NSICDXPROBLEMPLAN3_GEN_ALL_CORE_FT
- LFTs uptrending  - RUQ US: hepatic steatosis, no gallstones  - Will trend  - GI consulted- Dr Carrasco follow up

## 2021-04-18 NOTE — PROGRESS NOTE ADULT - ATTENDING COMMENTS
Patient is a 22 yo female from [facility] with PMH of autism, chronic pancreatitis, IDDM type 1 presents to the ED c/o epigastric pain x past several days admitted for acute pancreatitis.  Pt seen, examined, case & care plan d/w pt, residents at detail.  D/W GI- DR Ladd, continue current care, pt can have ice chips   IV Fluids, Clear liquid diet as per GI  Total care time is 40 minutes.

## 2021-04-18 NOTE — PROGRESS NOTE ADULT - SUBJECTIVE AND OBJECTIVE BOX
Patient is a 21y old  Female who presents with a chief complaint of Acute pancreatitis (17 Apr 2021 10:12)      HPI:  Patient is a 22 yo female from [facility] with PMH of autism, chronic pancreatitis, IDDM type 1 (Insulin pump present), and PCOS presents to the ED c/o epigastric pain x 2 days. Patient states that her sx are similar sx to her episodes of chronic pancreatitis. Patient has frequent flare-ups of her pancreatitis, last being a few weeks ago and did not require hospital stay. Patient was scheduled for total pancreatectomy w/ islet cell transplantation into liver this past January, but the procedure was cancelled due to COVID pandemic. Denies ETOH use, no change in medications. Patient states that her pain is well controlled s/p morphine 4mg IV in the ED. Patient notes mild nausea, which is improved s/p zofran. Denies any fevers, chills, CP, SOB, vomiting, diarrhea, constipation, or back pain. Patient received Moderna 2 of 2, last dose ~1 week ago. No known recent COVID exposures.    In the ED  VS- Temp 97.9F,  -->102, /76, RR 18, SpO2 97% on RA  Labs significant for lipase WNL, lactate 2.2, glu 222, AST 84,   CT a/p (+) acute pancreatitis, underlying chronic pancreatitis, hepatic steatosis  ECG showed sinus tachycardia, no signs of acute ischemia  s/p 1L NS, morphine 4mg IV x2, zofran 4mg IV x1 in the ED (14 Apr 2021 19:23)      INTERVAL HPI:  4/15/21: Patient admitted overnight. Patient seen and examined. Patient tired from late admission. States abdominal pain is improving, now a 4-4.5/10 in epigastric region with radiation to back. States pain regimen and heating pads have been helping her pain. Endorses intermittent nausea. No gallstones seen on imaging, patient denies EtOH use, and lipid panel only mildly elevated. NPO with IVF. Endocrine consulted - Dr C perlman ,for insulin pump.    4/16/21: No acute overnight events. Patient seen and examined. Patient tired, limited ROS given fatigue/pain. Says her abdominal pain and nausea are still present. Patient still NPO and on IVF. GI consulted, pt can be DC'd once she can tolerate PO diet. Endocrine consulted for insulin pump - pt wishes to hold off omnipod, started on Lantus 8U qam and continue Admelog scale q6hr while NPO., On IV Fluids.    4/17/21: Patient seen and examined. Patient tired, and sleeping during interview. States that abdominal pain and nausea are still present. Denies vomiting. On IVF. Pain controlled with current pain regimen. GI advanced diet to clear liquid for Diet. LFT's increasing.    4/18/21: No acute overnight events. Patient seen and examined. Patient tired, sleeping during interview. States that abdominal pain and nausea are still present. Says it worsens when she tries clear liquids. Per RN, patient barely drinking. On IVF. LFTs remain elevated.       OVERNIGHT EVENTS: NONE    Home Medications:  Allegra 180 mg oral tablet: 1 tab(s) orally once a day (14 Apr 2021 20:20)  Cranberry oral capsule: 8400 milligram(s) orally once a day (14 Apr 2021 20:20)  Creon 36,000 units oral delayed release capsule: 2 cap(s) orally 3 times a day (14 Apr 2021 20:20)  Creon 36,000 units oral delayed release capsule: 1 cap(s) orally once a day, As Needed with snacks (14 Apr 2021 20:20)  famotidine 40 mg oral tablet: 1 tab(s) orally once a day (at bedtime) (14 Apr 2021 20:20)  Flonase 50 mcg/inh nasal spray: 1 spray(s) nasal once a day (14 Apr 2021 20:20)  gabapentin 300 mg oral tablet: 1 tab(s) orally 2 times a day (14 Apr 2021 20:20)  Junel 1/20 oral tablet: 1 tab(s) orally once a day (14 Apr 2021 20:20)  Lantus 100 units/mL subcutaneous solution: Dose dependent on Freestlye Candy Sensor-14 (14 Apr 2021 20:20)  MiraLax oral powder for reconstitution: 17 gram(s) orally once a day (14 Apr 2021 20:20)  Multiple Vitamins oral tablet: 1 tab(s) orally once a day (14 Apr 2021 20:20)  NovoLOG 100 units/mL injectable solution: Sliding scale (14 Apr 2021 20:20)  ursodiol 300 mg oral capsule: 1 cap(s) orally once a day (14 Apr 2021 20:20)  Vitamin B12 1000 mcg oral tablet: 1 tab(s) orally once a day (14 Apr 2021 20:20)  Vitamin C 500 mg oral tablet: 1 tab(s) orally once a day (14 Apr 2021 20:20)  Zofran 4 mg oral tablet: 1 tab(s) orally every 6 hours, As Needed (14 Apr 2021 20:20)      MEDICATIONS  (STANDING):  ascorbic acid 500 milliGRAM(s) Oral daily  cyanocobalamin 1000 MICROGram(s) Oral daily  dextrose 40% Gel 15 Gram(s) Oral once  dextrose 5% + sodium chloride 0.9%. 1000 milliLiter(s) (125 mL/Hr) IV Continuous <Continuous>  dextrose 5%. 1000 milliLiter(s) (50 mL/Hr) IV Continuous <Continuous>  dextrose 5%. 1000 milliLiter(s) (100 mL/Hr) IV Continuous <Continuous>  dextrose 50% Injectable 25 Gram(s) IV Push once  dextrose 50% Injectable 12.5 Gram(s) IV Push once  dextrose 50% Injectable 25 Gram(s) IV Push once  enoxaparin Injectable 40 milliGRAM(s) SubCutaneous daily  famotidine    Tablet 40 milliGRAM(s) Oral two times a day  gabapentin 300 milliGRAM(s) Oral two times a day  glucagon  Injectable 1 milliGRAM(s) IntraMuscular once  insulin glargine Injectable (LANTUS) 10 Unit(s) SubCutaneous every morning  insulin lispro (ADMELOG) corrective regimen sliding scale   SubCutaneous every 6 hours  loratadine 10 milliGRAM(s) Oral daily  multivitamin 1 Tablet(s) Oral daily  pancrelipase  (CREON 36,000 Lipase Units) 2 Capsule(s) Oral three times a day with meals  polyethylene glycol 3350 17 Gram(s) Oral daily  ursodiol Capsule 300 milliGRAM(s) Oral <User Schedule>    MEDICATIONS  (PRN):  fluticasone propionate 50 MICROgram(s)/spray Nasal Spray 1 Spray(s) Both Nostrils two times a day PRN nasal congestion  morphine  - Injectable 2 milliGRAM(s) IV Push every 4 hours PRN Moderate Pain (4 - 6)  morphine  - Injectable 3 milliGRAM(s) IV Push every 4 hours PRN Severe Pain (7 - 10)  morphine  - Injectable 1 milliGRAM(s) IV Push every 3 hours PRN Mild Pain (1 - 3)  ondansetron    Tablet 4 milliGRAM(s) Oral every 6 hours PRN Nausea and/or Vomiting  pancrelipase  (CREON 36,000 Lipase Units) 1 Capsule(s) Oral daily PRN w/ snacks      Allergies    No Known Allergies    Intolerances        REVIEW OF SYSTEMS: Limited due to fatigue/pain  CONSTITUTIONAL: No fever, No chills, +Fatigue, No myalgia, No Body ache, No Weakness  EYES: No eye pain,  No visual disturbances, No discharge, NO Redness  ENMT:  No ear pain, No nose bleed, No vertigo; No sinus or throat pain, No Congestion  NECK: No pain, No stiffness  RESPIRATORY: No cough, wheezing, No  hemoptysis, No shortness of breath  CARDIOVASCULAR: No chest pain, palpitations  GASTROINTESTINAL: +Epigastric pain with radiation to the back. +Nausea, No vomiting; No diarrhea or constipation. [ 1 ] BM  GENITOURINARY: No dysuria, No frequency, No urgency, No hematuria, or incontinence  NEUROLOGICAL: No headaches, No dizziness, No numbness, No tingling, No tremors, No weakness  EXT: No Swelling, No Pain, No Edema  SKIN:  [ x ] No itching, burning, rashes, or lesions   MUSCULOSKELETAL: No joint pain or swelling; No muscle pain, +Back pain, No extremity pain  PSYCHIATRIC: No depression, anxiety, mood swings or difficulty sleeping at night  PAIN SCALE: [  ] None  [ x ] Other-4/10  ROS Unable to obtain due to - [  ] Dementia  [  ] Lethargy  [  ] Sedated [  ] non verbal  REST OF REVIEW Of SYSTEM - [ x ] Normal     Vital Signs Last 24 Hrs  T(C): 36.8 (18 Apr 2021 12:17), Max: 37 (17 Apr 2021 14:26)  T(F): 98.2 (18 Apr 2021 12:17), Max: 98.6 (17 Apr 2021 14:26)  HR: 73 (18 Apr 2021 12:17) (73 - 99)  BP: 105/67 (18 Apr 2021 12:17) (98/61 - 114/72)  BP(mean): --  RR: 18 (18 Apr 2021 12:17) (17 - 18)  SpO2: 96% (18 Apr 2021 12:17) (95% - 98%)  Finger Stick        04-17 @ 07:01  -  04-18 @ 07:00  --------------------------------------------------------  IN: 3000 mL / OUT: 450 mL / NET: 2550 mL        PHYSICAL EXAM:  GENERAL:  [x  ] NAD , [ x ] well appearing, [  ] Agitated, [x  ] Drowsy,  [  ] Lethargy, [  ] confused   HEAD:  [ x ] Normal, [  ] Other  EYES:  [ x ] EOMI, [x  ] PERRLA, [x  ] conjunctiva and sclera clear normal, [  ] Other,  [  ] Pallor,[  ] Discharge  ENMT:  [x  ] Normal, [  x] dry  mucous membranes, [  ] Good dentition, [ x ] No Thrush  NECK:  [ x ] Supple, [x  ] No JVD, [ x ] Normal thyroid, [  ] Lymphadenopathy [  ] Other  CHEST/LUNG:  [x  ] Clear to auscultation bilaterally, [ x ] Breath Sounds equal B/L ,  [x  ] No rales, [ x ] No rhonchi  [x  ]  No wheezing  HEART:  [x  ] Regular rate and rhythm, [  ] tachycardia, [  ] Bradycardia,  [  ] irregular  [ x ] No murmurs, No rubs, No gallops, [  ] PPM in place (Mfr:  )  ABDOMEN:  [ x ] Soft, [ x ] Tender in epigastric region NO R/R/G , [x  ] Nondistended, [ x ] No mass, [ x ] Bowel sounds present, [ x ] obese  NERVOUS SYSTEM:  [x  ] Alert & Oriented X3, [ x ] Nonfocal  [  ] Confusion  [  ] Encephalopathic [  ] Sedated [  ] Unable to assess, [  ] Other-  EXTREMITIES: [x  ] 2+ Peripheral Pulses, No clubbing, No cyanosis,  [  ] edema B/L lower EXT. [  ] PVD stasis skin changes B/L Lower EXT  LYMPH: No lymphadenopathy noted  SKIN:  [x  ] No rashes or lesions, [  ] Pressure Ulcers, [  ] ecchymosis, [  ] Skin Tears, [  ] Other    DIET: Clear liquid    LABS:                        12.0   4.07  )-----------( 255      ( 18 Apr 2021 09:15 )             36.2     18 Apr 2021 09:15    140    |  108    |  5      ----------------------------<  243    3.6     |  24     |  0.68     Ca    8.7        18 Apr 2021 09:15    TPro  7.2    /  Alb  2.8    /  TBili  0.4    /  DBili  .20    /  AST  323    /  ALT  286    /  AlkPhos  83     18 Apr 2021 09:15                             Anemia Panel:      Thyroid Panel:        Lipase, Serum: 151 U/L (04-16-21 @ 06:46)  Amylase, Serum Total: 22 U/L (04-16-21 @ 06:46)  Lipase, Serum: 274 U/L (04-14-21 @ 16:23)          RADIOLOGY & ADDITIONAL TESTS:    < from: US Abdomen Limited (04.17.21 @ 18:52) >  IMPRESSION:    Hepatic steatosis.    < end of copied text >        HEALTH ISSUES - PROBLEM Dx:  Acute pancreatitis    Diabetes mellitus  Insulin dependent    Transaminitis    Dyslipidemia    Autism spectrum disorder    Need for prophylactic measure            Consultant(s) Notes Reviewed:  [ x ] YES     Care Discussed with [ x ] Consultants, [ x ] Patient, [ x ] Family,- MOM -Jennifer [  ] HCP, [  ] , [  ] Social Service, [x ] RN, [  ] Physical Therapy, [  ] Palliative Care Team  DVT PPX: [ x ] Lovenox, [  ] SC Heparin, [  ] Coumadin, [  ] Xarelto, [  ] Eliquis, [  ] Pradaxa, [  ] IV Heparin drip, [  ] SCD, [  ] Ambulation, [  ] Contraindicated 2/2 GI Bleed, [  ] Contraindicated 2/2  Bleed, [  ] Contraindicated 2/2 Brain Bleed  Advanced Directive: [ x ] None, [  ] DNR/DNI Patient is a 21y old  Female who presents with a chief complaint of Acute pancreatitis (17 Apr 2021 10:12)      HPI:  Patient is a 20 [facility] with PMH of autism, chronic pancreatitis, IDDM type 1 (Insulin pump present), and PCOS presents to the ED c/o epigastric pain x 2 days. Patient states that her sx are similar sx to her episodes of chronic pancreatitis. Patient has frequent flare-ups of her pancreatitis, last being a few weeks ago and did not require hospital stay. Patient was scheduled for total pancreatectomy w/ islet cell transplantation into liver this past January, but the procedure was cancelled due to COVID pandemic. Denies ETOH use, no change in medications. Patient states that her pain is well controlled s/p morphine 4mg IV in the ED. Patient notes mild nausea, which is improved s/p zofran. Denies any fevers, chills, CP, SOB, vomiting, diarrhea, constipation, or back pain. Patient received Moderna 2 of 2, last dose ~1 week ago. No known recent COVID exposures.    In the ED  VS- Temp 97.9F,  -->102, /76, RR 18, SpO2 97% on RA  Labs significant for lipase WNL, lactate 2.2, glu 222, AST 84,   CT a/p (+) acute pancreatitis, underlying chronic pancreatitis, hepatic steatosis  ECG showed sinus tachycardia, no signs of acute ischemia  s/p 1L NS, morphine 4mg IV x2, zofran 4mg IV x1 in the ED (14 Apr 2021 19:23)      INTERVAL HPI:  4/15/21: Patient admitted overnight. Patient seen and examined. Patient tired from late admission. States abdominal pain is improving, now a 4-4.5/10 in epigastric region with radiation to back. States pain regimen and heating pads have been helping her pain. Endorses intermittent nausea. No gallstones seen on imaging, patient denies EtOH use, and lipid panel only mildly elevated. NPO with IVF. Endocrine consulted - Dr C perlman ,for insulin pump.    4/16/21: No acute overnight events. Patient seen and examined. Patient tired, limited ROS given fatigue/pain. Says her abdominal pain and nausea are still present. Patient still NPO and on IVF. GI consulted, pt can be DC'd once she can tolerate PO diet. Endocrine consulted for insulin pump - pt wishes to hold off omnipod, started on Lantus 8U qam and continue Admelog scale q6hr while NPO., On IV Fluids.    4/17/21: Patient seen and examined. Patient tired, and sleeping during interview. States that abdominal pain and nausea are still present. Denies vomiting. On IVF. Pain controlled with current pain regimen. GI advanced diet to clear liquid for Diet. LFT's increasing.    4/18/21: No acute overnight events. Patient seen and examined. Patient tired, sleeping during interview. States that abdominal pain and nausea are still present. Says it worsens when she tries clear liquids. Per RN, patient barely drinking. On IVF. LFTs remain elevated.       OVERNIGHT EVENTS: NONE    Home Medications:  Allegra 180 mg oral tablet: 1 tab(s) orally once a day (14 Apr 2021 20:20)  Cranberry oral capsule: 8400 milligram(s) orally once a day (14 Apr 2021 20:20)  Creon 36,000 units oral delayed release capsule: 2 cap(s) orally 3 times a day (14 Apr 2021 20:20)  Creon 36,000 units oral delayed release capsule: 1 cap(s) orally once a day, As Needed with snacks (14 Apr 2021 20:20)  famotidine 40 mg oral tablet: 1 tab(s) orally once a day (at bedtime) (14 Apr 2021 20:20)  Flonase 50 mcg/inh nasal spray: 1 spray(s) nasal once a day (14 Apr 2021 20:20)  gabapentin 300 mg oral tablet: 1 tab(s) orally 2 times a day (14 Apr 2021 20:20)  Junel 1/20 oral tablet: 1 tab(s) orally once a day (14 Apr 2021 20:20)  Lantus 100 units/mL subcutaneous solution: Dose dependent on Freestlye Candy Sensor-14 (14 Apr 2021 20:20)  MiraLax oral powder for reconstitution: 17 gram(s) orally once a day (14 Apr 2021 20:20)  Multiple Vitamins oral tablet: 1 tab(s) orally once a day (14 Apr 2021 20:20)  NovoLOG 100 units/mL injectable solution: Sliding scale (14 Apr 2021 20:20)  ursodiol 300 mg oral capsule: 1 cap(s) orally once a day (14 Apr 2021 20:20)  Vitamin B12 1000 mcg oral tablet: 1 tab(s) orally once a day (14 Apr 2021 20:20)  Vitamin C 500 mg oral tablet: 1 tab(s) orally once a day (14 Apr 2021 20:20)  Zofran 4 mg oral tablet: 1 tab(s) orally every 6 hours, As Needed (14 Apr 2021 20:20)      MEDICATIONS  (STANDING):  ascorbic acid 500 milliGRAM(s) Oral daily  cyanocobalamin 1000 MICROGram(s) Oral daily  dextrose 40% Gel 15 Gram(s) Oral once  dextrose 5% + sodium chloride 0.9%. 1000 milliLiter(s) (125 mL/Hr) IV Continuous <Continuous>  dextrose 5%. 1000 milliLiter(s) (50 mL/Hr) IV Continuous <Continuous>  dextrose 5%. 1000 milliLiter(s) (100 mL/Hr) IV Continuous <Continuous>  dextrose 50% Injectable 25 Gram(s) IV Push once  dextrose 50% Injectable 12.5 Gram(s) IV Push once  dextrose 50% Injectable 25 Gram(s) IV Push once  enoxaparin Injectable 40 milliGRAM(s) SubCutaneous daily  famotidine    Tablet 40 milliGRAM(s) Oral two times a day  gabapentin 300 milliGRAM(s) Oral two times a day  glucagon  Injectable 1 milliGRAM(s) IntraMuscular once  insulin glargine Injectable (LANTUS) 10 Unit(s) SubCutaneous every morning  insulin lispro (ADMELOG) corrective regimen sliding scale   SubCutaneous every 6 hours  loratadine 10 milliGRAM(s) Oral daily  multivitamin 1 Tablet(s) Oral daily  pancrelipase  (CREON 36,000 Lipase Units) 2 Capsule(s) Oral three times a day with meals  polyethylene glycol 3350 17 Gram(s) Oral daily  ursodiol Capsule 300 milliGRAM(s) Oral <User Schedule>    MEDICATIONS  (PRN):  fluticasone propionate 50 MICROgram(s)/spray Nasal Spray 1 Spray(s) Both Nostrils two times a day PRN nasal congestion  morphine  - Injectable 2 milliGRAM(s) IV Push every 4 hours PRN Moderate Pain (4 - 6)  morphine  - Injectable 3 milliGRAM(s) IV Push every 4 hours PRN Severe Pain (7 - 10)  morphine  - Injectable 1 milliGRAM(s) IV Push every 3 hours PRN Mild Pain (1 - 3)  ondansetron    Tablet 4 milliGRAM(s) Oral every 6 hours PRN Nausea and/or Vomiting  pancrelipase  (CREON 36,000 Lipase Units) 1 Capsule(s) Oral daily PRN w/ snacks      Allergies    No Known Allergies    Intolerances        REVIEW OF SYSTEMS: Limited due to fatigue/pain  CONSTITUTIONAL: No fever, No chills, +Fatigue, No myalgia, No Body ache, No Weakness  EYES: No eye pain,  No visual disturbances, No discharge, NO Redness  ENMT:  No ear pain, No nose bleed, No vertigo; No sinus or throat pain, No Congestion  NECK: No pain, No stiffness  RESPIRATORY: No cough, wheezing, No  hemoptysis, No shortness of breath  CARDIOVASCULAR: No chest pain, palpitations  GASTROINTESTINAL: +Epigastric pain with radiation to the back. +Nausea, No vomiting; No diarrhea or constipation. [ 1 ] BM  GENITOURINARY: No dysuria, No frequency, No urgency, No hematuria, or incontinence  NEUROLOGICAL: No headaches, No dizziness, No numbness, No tingling, No tremors, No weakness  EXT: No Swelling, No Pain, No Edema  SKIN:  [ x ] No itching, burning, rashes, or lesions   MUSCULOSKELETAL: No joint pain or swelling; No muscle pain, +Back pain, No extremity pain  PSYCHIATRIC: No depression, anxiety, mood swings or difficulty sleeping at night  PAIN SCALE: [  ] None  [ x ] Other-4/10  ROS Unable to obtain due to - [  ] Dementia  [  ] Lethargy  [  ] Sedated [  ] non verbal  REST OF REVIEW Of SYSTEM - [ x ] Normal     Vital Signs Last 24 Hrs  T(C): 36.8 (18 Apr 2021 12:17), Max: 37 (17 Apr 2021 14:26)  T(F): 98.2 (18 Apr 2021 12:17), Max: 98.6 (17 Apr 2021 14:26)  HR: 73 (18 Apr 2021 12:17) (73 - 99)  BP: 105/67 (18 Apr 2021 12:17) (98/61 - 114/72)  BP(mean): --  RR: 18 (18 Apr 2021 12:17) (17 - 18)  SpO2: 96% (18 Apr 2021 12:17) (95% - 98%)  Finger Stick        04-17 @ 07:01  -  04-18 @ 07:00  --------------------------------------------------------  IN: 3000 mL / OUT: 450 mL / NET: 2550 mL        PHYSICAL EXAM:  GENERAL:  [x  ] NAD , [ x ] well appearing, [  ] Agitated, [x  ] Drowsy,  [  ] Lethargy, [  ] confused   HEAD:  [ x ] Normal, [  ] Other  EYES:  [ x ] EOMI, [x  ] PERRLA, [x  ] conjunctiva and sclera clear normal, [  ] Other,  [  ] Pallor,[  ] Discharge  ENMT:  [x  ] Normal, [  x] dry  mucous membranes, [  ] Good dentition, [ x ] No Thrush  NECK:  [ x ] Supple, [x  ] No JVD, [ x ] Normal thyroid, [  ] Lymphadenopathy [  ] Other  CHEST/LUNG:  [x  ] Clear to auscultation bilaterally, [ x ] Breath Sounds equal B/L ,  [x  ] No rales, [ x ] No rhonchi  [x  ]  No wheezing  HEART:  [x  ] Regular rate and rhythm, [  ] tachycardia, [  ] Bradycardia,  [  ] irregular  [ x ] No murmurs, No rubs, No gallops, [  ] PPM in place (Mfr:  )  ABDOMEN:  [ x ] Soft, [ x ] Tender in epigastric region NO R/R/G , [x  ] Nondistended, [ x ] No mass, [ x ] Bowel sounds present, [ x ] obese  NERVOUS SYSTEM:  [x  ] Alert & Oriented X3, [ x ] Nonfocal  [  ] Confusion  [  ] Encephalopathic [  ] Sedated [  ] Unable to assess, [  ] Other-  EXTREMITIES: [x  ] 2+ Peripheral Pulses, No clubbing, No cyanosis,  [  ] edema B/L lower EXT. [  ] PVD stasis skin changes B/L Lower EXT  LYMPH: No lymphadenopathy noted  SKIN:  [x  ] No rashes or lesions, [  ] Pressure Ulcers, [  ] ecchymosis, [  ] Skin Tears, [  ] Other    DIET: Clear liquid    LABS:                        12.0   4.07  )-----------( 255      ( 18 Apr 2021 09:15 )             36.2     18 Apr 2021 09:15    140    |  108    |  5      ----------------------------<  243    3.6     |  24     |  0.68     Ca    8.7        18 Apr 2021 09:15    TPro  7.2    /  Alb  2.8    /  TBili  0.4    /  DBili  .20    /  AST  323    /  ALT  286    /  AlkPhos  83     18 Apr 2021 09:15      Lipase, Serum: 151 U/L (04-16-21 @ 06:46)  Amylase, Serum Total: 22 U/L (04-16-21 @ 06:46)  Lipase, Serum: 274 U/L (04-14-21 @ 16:23)      RADIOLOGY & ADDITIONAL TESTS:    < from: US Abdomen Limited (04.17.21 @ 18:52) >  IMPRESSION:    Hepatic steatosis.    < end of copied text >        HEALTH ISSUES - PROBLEM Dx:  Acute pancreatitis    Diabetes mellitus  Insulin dependent    Transaminitis    Dyslipidemia    Autism spectrum disorder    Need for prophylactic measure      Consultant(s) Notes Reviewed:  [ x ] YES     Care Discussed with [ x ] Consultants, [ x ] Patient, [ x ] Family,- MOM -Jennifer [  ] HCP, [  ] , [  ] Social Service, [x ] RN, [  ] Physical Therapy, [  ] Palliative Care Team  DVT PPX: [ x ] Lovenox, [  ] SC Heparin, [  ] Coumadin, [  ] Xarelto, [  ] Eliquis, [  ] Pradaxa, [  ] IV Heparin drip, [  ] SCD, [  ] Ambulation, [  ] Contraindicated 2/2 GI Bleed, [  ] Contraindicated 2/2  Bleed, [  ] Contraindicated 2/2 Brain Bleed  Advanced Directive: [ x ] None, [  ] DNR/DNI

## 2021-04-18 NOTE — PROGRESS NOTE ADULT - [id number]_GEN_ALL_CORE_FT
Patient w/ hx of recurrent chronic pancreatitis p/w epigastric pain, found to have acute pancreatitis w/ underlying chronic pancreatitis   ?Etiology   on CT a/p shows Ac Pancreatitis , No GB stones  -Does not meet SIRS criteria  -Denies ETOH use, no change in medications, lipid panel with only mild elevation  -Continue with D5/NS   -Pain control w/ Morphine 1 mg IV for Mild / morphine 2mg IV q4 for moderate, 3mg q4 for severe pain  -Zofran PRN nausea  -On clear liquids per GI, On Pancreatic enzyme supplement  -GI consulted, recs appreciated

## 2021-04-18 NOTE — PROGRESS NOTE ADULT - SUBJECTIVE AND OBJECTIVE BOX
INTERVAL HPI/OVERNIGHT EVENTS:  Pt seen and examined   No acute overnight events as per  nursing staff   Pain improving   No eating b/c no appetite       MEDICATIONS  (STANDING):  ascorbic acid 500 milliGRAM(s) Oral daily  cyanocobalamin 1000 MICROGram(s) Oral daily  dextrose 40% Gel 15 Gram(s) Oral once  dextrose 5% + sodium chloride 0.9%. 1000 milliLiter(s) (125 mL/Hr) IV Continuous <Continuous>  dextrose 5%. 1000 milliLiter(s) (50 mL/Hr) IV Continuous <Continuous>  dextrose 5%. 1000 milliLiter(s) (100 mL/Hr) IV Continuous <Continuous>  dextrose 50% Injectable 25 Gram(s) IV Push once  dextrose 50% Injectable 12.5 Gram(s) IV Push once  dextrose 50% Injectable 25 Gram(s) IV Push once  enoxaparin Injectable 40 milliGRAM(s) SubCutaneous daily  famotidine    Tablet 40 milliGRAM(s) Oral two times a day  gabapentin 300 milliGRAM(s) Oral two times a day  glucagon  Injectable 1 milliGRAM(s) IntraMuscular once  insulin glargine Injectable (LANTUS) 10 Unit(s) SubCutaneous every morning  insulin lispro (ADMELOG) corrective regimen sliding scale   SubCutaneous every 6 hours  loratadine 10 milliGRAM(s) Oral daily  multivitamin 1 Tablet(s) Oral daily  pancrelipase  (CREON 36,000 Lipase Units) 2 Capsule(s) Oral three times a day with meals  polyethylene glycol 3350 17 Gram(s) Oral daily  ursodiol Capsule 300 milliGRAM(s) Oral <User Schedule>    MEDICATIONS  (PRN):  fluticasone propionate 50 MICROgram(s)/spray Nasal Spray 1 Spray(s) Both Nostrils two times a day PRN nasal congestion  morphine  - Injectable 2 milliGRAM(s) IV Push every 4 hours PRN Moderate Pain (4 - 6)  morphine  - Injectable 3 milliGRAM(s) IV Push every 4 hours PRN Severe Pain (7 - 10)  morphine  - Injectable 1 milliGRAM(s) IV Push every 3 hours PRN Mild Pain (1 - 3)  ondansetron    Tablet 4 milliGRAM(s) Oral every 6 hours PRN Nausea and/or Vomiting  pancrelipase  (CREON 36,000 Lipase Units) 1 Capsule(s) Oral daily PRN w/ snacks      Allergies    No Known Allergies    Intolerances            ROS:   General:  No wt loss, fevers, chills, night sweats, fatigue,   Eyes:  Good vision, no reported pain  ENT:  No sore throat, pain, runny nose, dysphagia  CV:  No pain, palpitations, hypo/hypertension  Resp:  No dyspnea, cough, tachypnea, wheezing  GI:  No pain, No nausea, No vomiting, No diarrhea, No constipation, No weight loss, No fever, No pruritis, No rectal bleeding, No tarry stools, No dysphagia,  :  No pain, bleeding, incontinence, nocturia  Muscle:  No pain, weakness  Neuro:  No weakness, tingling, memory problems  Psych:  No fatigue, insomnia, mood problems, depression  Endocrine:  No polyuria, polydipsia, cold/heat intolerance  Heme:  No petechiae, ecchymosis, easy bruisability  Skin:  No rash, tattoos, scars, edema    Vital Signs Last 24 Hrs  T(C): 36.7 (18 Apr 2021 05:00), Max: 37 (17 Apr 2021 14:26)  T(F): 98.1 (18 Apr 2021 05:00), Max: 98.6 (17 Apr 2021 14:26)  HR: 86 (18 Apr 2021 05:00) (76 - 99)  BP: 104/63 (18 Apr 2021 05:00) (98/61 - 114/72)  BP(mean): --  RR: 18 (18 Apr 2021 05:00) (17 - 18)  SpO2: 95% (18 Apr 2021 05:00) (95% - 98%)      GENERAL:  Appears stated age, well-groomed, well-nourished, no distress  HEENT:  NC/AT,  conjunctivae clear and pink, no thyromegaly, nodules, adenopathy, no JVD, sclera -anicteric  CHEST:  Full & symmetric excursion, no increased effort, breath sounds clear  HEART:  Regular rhythm, S1, S2, no murmur/rub/S3/S4, no abdominal bruit, no edema  ABDOMEN:  Soft, non-tender, non-distended, normoactive bowel sounds,  no masses ,no hepato-splenomegaly, no signs of chronic liver disease  EXTEREMITIES:  no cyanosis,clubbing or edema  SKIN:  No rash/erythema/ecchymoses/petechiae/wounds/abscess/warm/dry  NEURO:  Alert, oriented, no asterixis, no tremor, no encephalopathy          LABS:                        12.0   4.07  )-----------( 255      ( 18 Apr 2021 09:15 )             36.2     04-18    140  |  108  |  5<L>  ----------------------------<  243<H>  3.6   |  24  |  0.68    Ca    8.7      18 Apr 2021 09:15    TPro  7.2  /  Alb  2.8<L>  /  TBili  0.4  /  DBili  .20  /  AST  323<H>  /  ALT  286<H>  /  AlkPhos  83  04-18 04-17-21 @ 07:01  -  04-18-21 @ 07:00  --------------------------------------------------------  IN: 3000 mL / OUT: 450 mL / NET: 2550 mL

## 2021-04-19 LAB
ALBUMIN SERPL ELPH-MCNC: 2.9 G/DL — LOW (ref 3.3–5)
ALP SERPL-CCNC: 86 U/L — SIGNIFICANT CHANGE UP (ref 40–120)
ALT FLD-CCNC: 271 U/L — HIGH (ref 12–78)
ANION GAP SERPL CALC-SCNC: 9 MMOL/L — SIGNIFICANT CHANGE UP (ref 5–17)
AST SERPL-CCNC: 220 U/L — HIGH (ref 15–37)
BILIRUB SERPL-MCNC: 0.4 MG/DL — SIGNIFICANT CHANGE UP (ref 0.2–1.2)
BUN SERPL-MCNC: 4 MG/DL — LOW (ref 7–23)
CALCIUM SERPL-MCNC: 8.8 MG/DL — SIGNIFICANT CHANGE UP (ref 8.5–10.1)
CHLORIDE SERPL-SCNC: 107 MMOL/L — SIGNIFICANT CHANGE UP (ref 96–108)
CO2 SERPL-SCNC: 24 MMOL/L — SIGNIFICANT CHANGE UP (ref 22–31)
CREAT SERPL-MCNC: 0.6 MG/DL — SIGNIFICANT CHANGE UP (ref 0.5–1.3)
GLUCOSE SERPL-MCNC: 224 MG/DL — HIGH (ref 70–99)
HCT VFR BLD CALC: 37.1 % — SIGNIFICANT CHANGE UP (ref 34.5–45)
HGB BLD-MCNC: 12.2 G/DL — SIGNIFICANT CHANGE UP (ref 11.5–15.5)
MCHC RBC-ENTMCNC: 29.4 PG — SIGNIFICANT CHANGE UP (ref 27–34)
MCHC RBC-ENTMCNC: 32.9 GM/DL — SIGNIFICANT CHANGE UP (ref 32–36)
MCV RBC AUTO: 89.4 FL — SIGNIFICANT CHANGE UP (ref 80–100)
NRBC # BLD: 0 /100 WBCS — SIGNIFICANT CHANGE UP (ref 0–0)
PLATELET # BLD AUTO: 273 K/UL — SIGNIFICANT CHANGE UP (ref 150–400)
POTASSIUM SERPL-MCNC: 3.4 MMOL/L — LOW (ref 3.5–5.3)
POTASSIUM SERPL-SCNC: 3.4 MMOL/L — LOW (ref 3.5–5.3)
PROT SERPL-MCNC: 7.3 G/DL — SIGNIFICANT CHANGE UP (ref 6–8.3)
RBC # BLD: 4.15 M/UL — SIGNIFICANT CHANGE UP (ref 3.8–5.2)
RBC # FLD: 12.4 % — SIGNIFICANT CHANGE UP (ref 10.3–14.5)
SARS-COV-2 RNA SPEC QL NAA+PROBE: SIGNIFICANT CHANGE UP
SODIUM SERPL-SCNC: 140 MMOL/L — SIGNIFICANT CHANGE UP (ref 135–145)
WBC # BLD: 4.32 K/UL — SIGNIFICANT CHANGE UP (ref 3.8–10.5)
WBC # FLD AUTO: 4.32 K/UL — SIGNIFICANT CHANGE UP (ref 3.8–10.5)

## 2021-04-19 RX ORDER — MORPHINE SULFATE 50 MG/1
2 CAPSULE, EXTENDED RELEASE ORAL EVERY 4 HOURS
Refills: 0 | Status: DISCONTINUED | OUTPATIENT
Start: 2021-04-19 | End: 2021-04-20

## 2021-04-19 RX ORDER — POTASSIUM CHLORIDE 20 MEQ
40 PACKET (EA) ORAL ONCE
Refills: 0 | Status: COMPLETED | OUTPATIENT
Start: 2021-04-19 | End: 2021-04-19

## 2021-04-19 RX ORDER — MORPHINE SULFATE 50 MG/1
0.5 CAPSULE, EXTENDED RELEASE ORAL
Refills: 0 | Status: DISCONTINUED | OUTPATIENT
Start: 2021-04-19 | End: 2021-04-20

## 2021-04-19 RX ORDER — INSULIN LISPRO 100/ML
VIAL (ML) SUBCUTANEOUS
Refills: 0 | Status: DISCONTINUED | OUTPATIENT
Start: 2021-04-19 | End: 2021-04-22

## 2021-04-19 RX ORDER — MORPHINE SULFATE 50 MG/1
1 CAPSULE, EXTENDED RELEASE ORAL EVERY 4 HOURS
Refills: 0 | Status: DISCONTINUED | OUTPATIENT
Start: 2021-04-19 | End: 2021-04-20

## 2021-04-19 RX ADMIN — PREGABALIN 1000 MICROGRAM(S): 225 CAPSULE ORAL at 12:21

## 2021-04-19 RX ADMIN — Medication 2: at 12:17

## 2021-04-19 RX ADMIN — FAMOTIDINE 40 MILLIGRAM(S): 10 INJECTION INTRAVENOUS at 06:43

## 2021-04-19 RX ADMIN — Medication 2: at 06:42

## 2021-04-19 RX ADMIN — ENOXAPARIN SODIUM 40 MILLIGRAM(S): 100 INJECTION SUBCUTANEOUS at 12:21

## 2021-04-19 RX ADMIN — FAMOTIDINE 40 MILLIGRAM(S): 10 INJECTION INTRAVENOUS at 18:29

## 2021-04-19 RX ADMIN — Medication 40 MILLIEQUIVALENT(S): at 12:17

## 2021-04-19 RX ADMIN — GABAPENTIN 300 MILLIGRAM(S): 400 CAPSULE ORAL at 18:29

## 2021-04-19 RX ADMIN — Medication 1 TABLET(S): at 12:23

## 2021-04-19 RX ADMIN — LORATADINE 10 MILLIGRAM(S): 10 TABLET ORAL at 12:21

## 2021-04-19 RX ADMIN — URSODIOL 300 MILLIGRAM(S): 250 TABLET, FILM COATED ORAL at 06:43

## 2021-04-19 RX ADMIN — Medication 3: at 23:56

## 2021-04-19 RX ADMIN — Medication 500 MILLIGRAM(S): at 12:21

## 2021-04-19 RX ADMIN — ONDANSETRON 4 MILLIGRAM(S): 8 TABLET, FILM COATED ORAL at 01:03

## 2021-04-19 RX ADMIN — MORPHINE SULFATE 2 MILLIGRAM(S): 50 CAPSULE, EXTENDED RELEASE ORAL at 01:30

## 2021-04-19 RX ADMIN — INSULIN GLARGINE 10 UNIT(S): 100 INJECTION, SOLUTION SUBCUTANEOUS at 10:08

## 2021-04-19 RX ADMIN — MORPHINE SULFATE 2 MILLIGRAM(S): 50 CAPSULE, EXTENDED RELEASE ORAL at 01:07

## 2021-04-19 RX ADMIN — GABAPENTIN 300 MILLIGRAM(S): 400 CAPSULE ORAL at 06:43

## 2021-04-19 NOTE — PROGRESS NOTE ADULT - NSICDXPROBLEMPLAN1_GEN_ALL_CORE_FT
type 3c diabetes  pt wishes to hold off on resuming omnipod at this time  cont lantus 10 units qam  cont admelog scale coverage q6hrs while npo  goal bg 100-180 in hosp setting

## 2021-04-19 NOTE — PROGRESS NOTE ADULT - SUBJECTIVE AND OBJECTIVE BOX
INTERVAL HPI/OVERNIGHT EVENTS:  Pt seen and examined   No acute overnight events as per  nursing staff   feels better  starting clears    MEDICATIONS  (STANDING):  ascorbic acid 500 milliGRAM(s) Oral daily  cyanocobalamin 1000 MICROGram(s) Oral daily  dextrose 40% Gel 15 Gram(s) Oral once  dextrose 5% + sodium chloride 0.9%. 1000 milliLiter(s) (125 mL/Hr) IV Continuous <Continuous>  dextrose 5%. 1000 milliLiter(s) (50 mL/Hr) IV Continuous <Continuous>  dextrose 5%. 1000 milliLiter(s) (100 mL/Hr) IV Continuous <Continuous>  dextrose 50% Injectable 25 Gram(s) IV Push once  dextrose 50% Injectable 12.5 Gram(s) IV Push once  dextrose 50% Injectable 25 Gram(s) IV Push once  enoxaparin Injectable 40 milliGRAM(s) SubCutaneous daily  famotidine    Tablet 40 milliGRAM(s) Oral two times a day  gabapentin 300 milliGRAM(s) Oral two times a day  glucagon  Injectable 1 milliGRAM(s) IntraMuscular once  insulin glargine Injectable (LANTUS) 10 Unit(s) SubCutaneous every morning  insulin lispro (ADMELOG) corrective regimen sliding scale   SubCutaneous every 6 hours  loratadine 10 milliGRAM(s) Oral daily  multivitamin 1 Tablet(s) Oral daily  pancrelipase  (CREON 36,000 Lipase Units) 2 Capsule(s) Oral three times a day with meals  polyethylene glycol 3350 17 Gram(s) Oral daily  ursodiol Capsule 300 milliGRAM(s) Oral <User Schedule>    MEDICATIONS  (PRN):  fluticasone propionate 50 MICROgram(s)/spray Nasal Spray 1 Spray(s) Both Nostrils two times a day PRN nasal congestion  morphine  - Injectable 2 milliGRAM(s) IV Push every 4 hours PRN Moderate Pain (4 - 6)  morphine  - Injectable 3 milliGRAM(s) IV Push every 4 hours PRN Severe Pain (7 - 10)  morphine  - Injectable 1 milliGRAM(s) IV Push every 3 hours PRN Mild Pain (1 - 3)  ondansetron    Tablet 4 milliGRAM(s) Oral every 6 hours PRN Nausea and/or Vomiting  pancrelipase  (CREON 36,000 Lipase Units) 1 Capsule(s) Oral daily PRN w/ snacks      Allergies    No Known Allergies    Intolerances            ROS:   General:  No wt loss, fevers, chills, night sweats, fatigue,   Eyes:  Good vision, no reported pain  ENT:  No sore throat, pain, runny nose, dysphagia  CV:  No pain, palpitations, hypo/hypertension  Resp:  No dyspnea, cough, tachypnea, wheezing  GI:  No pain, No nausea, No vomiting, No diarrhea, No constipation, No weight loss, No fever, No pruritis, No rectal bleeding, No tarry stools, No dysphagia,  :  No pain, bleeding, incontinence, nocturia  Muscle:  No pain, weakness  Neuro:  No weakness, tingling, memory problems  Psych:  No fatigue, insomnia, mood problems, depression  Endocrine:  No polyuria, polydipsia, cold/heat intolerance  Heme:  No petechiae, ecchymosis, easy bruisability  Skin:  No rash, tattoos, scars, edema    Vital Signs Last 24 Hrs  T(C): 36.7 (18 Apr 2021 05:00), Max: 37 (17 Apr 2021 14:26)  T(F): 98.1 (18 Apr 2021 05:00), Max: 98.6 (17 Apr 2021 14:26)  HR: 86 (18 Apr 2021 05:00) (76 - 99)  BP: 104/63 (18 Apr 2021 05:00) (98/61 - 114/72)  BP(mean): --  RR: 18 (18 Apr 2021 05:00) (17 - 18)  SpO2: 95% (18 Apr 2021 05:00) (95% - 98%)      GENERAL:  Appears stated age, well-groomed, well-nourished, no distress  HEENT:  NC/AT,  conjunctivae clear and pink, no thyromegaly, nodules, adenopathy, no JVD, sclera -anicteric  CHEST:  Full & symmetric excursion, no increased effort, breath sounds clear  HEART:  Regular rhythm, S1, S2, no murmur/rub/S3/S4, no abdominal bruit, no edema  ABDOMEN:  Soft, non-tender, non-distended, normoactive bowel sounds,  no masses ,no hepato-splenomegaly, no signs of chronic liver disease  EXTEREMITIES:  no cyanosis,clubbing or edema  SKIN:  No rash/erythema/ecchymoses/petechiae/wounds/abscess/warm/dry  NEURO:  Alert, oriented, no asterixis, no tremor, no encephalopathy          LABS:                        12.0   4.07  )-----------( 255      ( 18 Apr 2021 09:15 )             36.2     04-18    140  |  108  |  5<L>  ----------------------------<  243<H>  3.6   |  24  |  0.68    Ca    8.7      18 Apr 2021 09:15    TPro  7.2  /  Alb  2.8<L>  /  TBili  0.4  /  DBili  .20  /  AST  323<H>  /  ALT  286<H>  /  AlkPhos  83  04-18 04-17-21 @ 07:01  -  04-18-21 @ 07:00  --------------------------------------------------------  IN: 3000 mL / OUT: 450 mL / NET: 2550 mL

## 2021-04-19 NOTE — PROGRESS NOTE ADULT - ASSESSMENT
Patient is a 20 yo female from [facility] with PMH of autism, chronic pancreatitis, IDDM type 1 presents to the ED c/o epigastric pain x past several days admitted for acute pancreatitis.

## 2021-04-19 NOTE — PROGRESS NOTE ADULT - SUBJECTIVE AND OBJECTIVE BOX
CAPILLARY BLOOD GLUCOSE      POCT Blood Glucose.: 250 mg/dL (19 Apr 2021 06:40)  POCT Blood Glucose.: 264 mg/dL (18 Apr 2021 23:09)  POCT Blood Glucose.: 211 mg/dL (18 Apr 2021 19:33)  POCT Blood Glucose.: 180 mg/dL (18 Apr 2021 17:45)  POCT Blood Glucose.: 238 mg/dL (18 Apr 2021 11:56)      Vital Signs Last 24 Hrs  T(C): 36.7 (19 Apr 2021 05:01), Max: 36.8 (18 Apr 2021 12:17)  T(F): 98.1 (19 Apr 2021 05:01), Max: 98.2 (18 Apr 2021 12:17)  HR: 72 (19 Apr 2021 05:01) (72 - 80)  BP: 95/58 (19 Apr 2021 05:01) (95/58 - 105/67)  BP(mean): --  RR: 17 (19 Apr 2021 05:01) (17 - 18)  SpO2: 96% (19 Apr 2021 05:01) (96% - 96%)    General: WN/WD NAD  Respiratory: CTA B/L  CV: RRR, S1S2, no murmurs, rubs or gallops  Abdominal: Soft, NT, ND +BS, Last BM  Extremities: No edema, + peripheral pulses     04-18    140  |  108  |  5<L>  ----------------------------<  243<H>  3.6   |  24  |  0.68    Ca    8.7      18 Apr 2021 09:15    TPro  7.2  /  Alb  2.8<L>  /  TBili  0.4  /  DBili  .20  /  AST  323<H>  /  ALT  286<H>  /  AlkPhos  83  04-18      dextrose 40% Gel 15 Gram(s) Oral once  dextrose 50% Injectable 25 Gram(s) IV Push once  dextrose 50% Injectable 12.5 Gram(s) IV Push once  dextrose 50% Injectable 25 Gram(s) IV Push once  glucagon  Injectable 1 milliGRAM(s) IntraMuscular once  insulin glargine Injectable (LANTUS) 10 Unit(s) SubCutaneous every morning  insulin lispro (ADMELOG) corrective regimen sliding scale   SubCutaneous every 6 hours

## 2021-04-19 NOTE — PROGRESS NOTE ADULT - ATTENDING COMMENTS
[place] is a 20 [place] with PMH of autism, chronic pancreatitis, IDDM type 1 presents to the ED c/o epigastric pain x past several days admitted for acute pancreatitis .improving   Pt seen, examined, case & care plan d/w pt, residents at detail.  D/W GI- DR Ladd, continue current care,   IV Fluids, Clear liquid diet as per GI Ambulation   Total care time is 40 minutes.

## 2021-04-19 NOTE — PROGRESS NOTE ADULT - ASSESSMENT
acute on chronic pancreatitis       clears as nusrat  if tolerates then low fat in am  dc planning once tolerating po  patient with recent eus at [facility]  being eval for pancreatectomy and islet cell transplant at [place]    Advanced care planning was discussed with patient and family.  Advanced care planning forms were reviewed and discussed.  Risks, benefits and alternatives of gastroenterologic procedures were discussed in detail and all questions were answered.    30 minutes spent.

## 2021-04-19 NOTE — PROGRESS NOTE ADULT - NSICDXPROBLEMPLAN1_GEN_ALL_CORE_FT
Patient w/ hx of recurrent chronic pancreatitis p/w epigastric pain, found to have acute pancreatitis w/ underlying chronic pancreatitis on CT scan. ?Etiology   -Does not meet SIRS criteria  -Denies ETOH use, no change in medications, lipid panel with only mild elevation, no gallstones on imaging   -Continue with D5/NS   -Pain control w/ Morphine 1 mg IV for Mild / morphine 2mg IV q4 for moderate, 3mg q4 for severe pain  -Zofran PRN nausea  -On clear liquids per GI, On Pancreatic enzyme supplement  -GI consulted, recs appreciated Patient w/ hx of recurrent chronic pancreatitis p/w epigastric pain, found to have acute pancreatitis w/ underlying chronic pancreatitis   on CT scan. ?Etiology   -Does not meet SIRS criteria  -Denies ETOH use, no change in medications, lipid panel with only mild elevation, no gallstones on imaging   -Continue with D5/NS   -Pain control w/ Morphine, will wean dose today and as tolerated  -Zofran PRN nausea  -On clear liquids per GI, On Pancreatic enzyme supplement  -GI consulted, recs appreciated

## 2021-04-19 NOTE — PROGRESS NOTE ADULT - SUBJECTIVE AND OBJECTIVE BOX
Patient is a 21y old  Female who presents with a chief complaint of Acute pancreatitis (17 Apr 2021 10:12)      HPI:  Patient is a 22 [facility] with PMH of autism, chronic pancreatitis, IDDM type 1 (Insulin pump present), and PCOS presents to the ED c/o epigastric pain x 2 days. Patient states that her sx are similar sx to her episodes of chronic pancreatitis. Patient has frequent flare-ups of her pancreatitis, last being a few weeks ago and did not require hospital stay. Patient was scheduled for total pancreatectomy w/ islet cell transplantation into liver this past January, but the procedure was cancelled due to COVID pandemic. Denies ETOH use, no change in medications. Patient states that her pain is well controlled s/p morphine 4mg IV in the ED. Patient notes mild nausea, which is improved s/p zofran. Denies any fevers, chills, CP, SOB, vomiting, diarrhea, constipation, or back pain. Patient received Moderna 2 of 2, last dose ~1 week ago. No known recent COVID exposures.    In the ED  VS- Temp 97.9F,  -->102, /76, RR 18, SpO2 97% on RA  Labs significant for lipase WNL, lactate 2.2, glu 222, AST 84,   CT a/p (+) acute pancreatitis, underlying chronic pancreatitis, hepatic steatosis  ECG showed sinus tachycardia, no signs of acute ischemia  s/p 1L NS, morphine 4mg IV x2, zofran 4mg IV x1 in the ED (14 Apr 2021 19:23)      INTERVAL HPI:  4/15/21: Patient admitted overnight. Patient seen and examined. Patient tired from late admission. States abdominal pain is improving, now a 4-4.5/10 in epigastric region with radiation to back. States pain regimen and heating pads have been helping her pain. Endorses intermittent nausea. No gallstones seen on imaging, patient denies EtOH use, and lipid panel only mildly elevated. NPO with IVF. Endocrine consulted - Dr C perlman ,for insulin pump.    4/16/21: No acute overnight events. Patient seen and examined. Patient tired, limited ROS given fatigue/pain. Says her abdominal pain and nausea are still present. Patient still NPO and on IVF. GI consulted, pt can be DC'd once she can tolerate PO diet. Endocrine consulted for insulin pump - pt wishes to hold off omnipod, started on Lantus 8U qam and continue Admelog scale q6hr while NPO., On IV Fluids.    4/17/21: Patient seen and examined. Patient tired, and sleeping during interview. States that abdominal pain and nausea are still present. Denies vomiting. On IVF. Pain controlled with current pain regimen. GI advanced diet to clear liquid for Diet. LFT's increasing.    4/18/21: No acute overnight events. Patient seen and examined. Patient tired, sleeping during interview. States that abdominal pain and nausea are still present. Says it worsens when she tries clear liquids. Per RN, patient barely drinking. On IVF. LFTs remain elevated.     4/19/21: No acute overnight events. Patient seen and examined. Patient is tired, sleeping during interview. States that abdominal pain and nausea are more intermittent now rather than constant but still present. Said she tried to have clear liquids yesterday when her mother was visiting but it increased her pain and nausea. On IVF. LFTs still elevated but now downtrending. K repleted.     OVERNIGHT EVENTS: NONE    Home Medications:  Allegra 180 mg oral tablet: 1 tab(s) orally once a day (14 Apr 2021 20:20)  Cranberry oral capsule: 8400 milligram(s) orally once a day (14 Apr 2021 20:20)  Creon 36,000 units oral delayed release capsule: 2 cap(s) orally 3 times a day (14 Apr 2021 20:20)  Creon 36,000 units oral delayed release capsule: 1 cap(s) orally once a day, As Needed with snacks (14 Apr 2021 20:20)  famotidine 40 mg oral tablet: 1 tab(s) orally once a day (at bedtime) (14 Apr 2021 20:20)  Flonase 50 mcg/inh nasal spray: 1 spray(s) nasal once a day (14 Apr 2021 20:20)  gabapentin 300 mg oral tablet: 1 tab(s) orally 2 times a day (14 Apr 2021 20:20)  Junel 1/20 oral tablet: 1 tab(s) orally once a day (14 Apr 2021 20:20)  Lantus 100 units/mL subcutaneous solution: Dose dependent on Freestlye Candy Sensor-14 (14 Apr 2021 20:20)  MiraLax oral powder for reconstitution: 17 gram(s) orally once a day (14 Apr 2021 20:20)  Multiple Vitamins oral tablet: 1 tab(s) orally once a day (14 Apr 2021 20:20)  NovoLOG 100 units/mL injectable solution: Sliding scale (14 Apr 2021 20:20)  ursodiol 300 mg oral capsule: 1 cap(s) orally once a day (14 Apr 2021 20:20)  Vitamin B12 1000 mcg oral tablet: 1 tab(s) orally once a day (14 Apr 2021 20:20)  Vitamin C 500 mg oral tablet: 1 tab(s) orally once a day (14 Apr 2021 20:20)  Zofran 4 mg oral tablet: 1 tab(s) orally every 6 hours, As Needed (14 Apr 2021 20:20)      MEDICATIONS  (STANDING):  ascorbic acid 500 milliGRAM(s) Oral daily  cyanocobalamin 1000 MICROGram(s) Oral daily  dextrose 40% Gel 15 Gram(s) Oral once  dextrose 5% + sodium chloride 0.9%. 1000 milliLiter(s) (100 mL/Hr) IV Continuous <Continuous>  dextrose 5%. 1000 milliLiter(s) (50 mL/Hr) IV Continuous <Continuous>  dextrose 5%. 1000 milliLiter(s) (100 mL/Hr) IV Continuous <Continuous>  dextrose 50% Injectable 25 Gram(s) IV Push once  dextrose 50% Injectable 12.5 Gram(s) IV Push once  dextrose 50% Injectable 25 Gram(s) IV Push once  enoxaparin Injectable 40 milliGRAM(s) SubCutaneous daily  famotidine    Tablet 40 milliGRAM(s) Oral two times a day  gabapentin 300 milliGRAM(s) Oral two times a day  glucagon  Injectable 1 milliGRAM(s) IntraMuscular once  insulin glargine Injectable (LANTUS) 10 Unit(s) SubCutaneous every morning  insulin lispro (ADMELOG) corrective regimen sliding scale   SubCutaneous every 6 hours  loratadine 10 milliGRAM(s) Oral daily  multivitamin 1 Tablet(s) Oral daily  pancrelipase  (CREON 36,000 Lipase Units) 2 Capsule(s) Oral three times a day with meals  polyethylene glycol 3350 17 Gram(s) Oral daily  potassium chloride   Powder 40 milliEquivalent(s) Oral once  ursodiol Capsule 300 milliGRAM(s) Oral <User Schedule>    MEDICATIONS  (PRN):  fluticasone propionate 50 MICROgram(s)/spray Nasal Spray 1 Spray(s) Both Nostrils two times a day PRN nasal congestion  morphine  - Injectable 2 milliGRAM(s) IV Push every 4 hours PRN Moderate Pain (4 - 6)  morphine  - Injectable 3 milliGRAM(s) IV Push every 4 hours PRN Severe Pain (7 - 10)  morphine  - Injectable 1 milliGRAM(s) IV Push every 3 hours PRN Mild Pain (1 - 3)  ondansetron    Tablet 4 milliGRAM(s) Oral every 6 hours PRN Nausea and/or Vomiting  pancrelipase  (CREON 36,000 Lipase Units) 1 Capsule(s) Oral daily PRN w/ snacks      Allergies    No Known Allergies    Intolerances        REVIEW OF SYSTEMS: Limited due to fatigue/pain  CONSTITUTIONAL: No fever, No chills, +Fatigue, No myalgia, No Body ache, No Weakness  EYES: No eye pain,  No visual disturbances, No discharge, NO Redness  ENMT:  No ear pain, No nose bleed, No vertigo; No sinus or throat pain, No Congestion  NECK: No pain, No stiffness  RESPIRATORY: No cough, wheezing, No  hemoptysis, No shortness of breath  CARDIOVASCULAR: No chest pain, palpitations  GASTROINTESTINAL: +Epigastric pain with radiation to the back, more intermittent now rather than constant. +Nausea, more intermittent now rather than constant. No vomiting. No diarrhea or constipation. [ 0 ] BM  GENITOURINARY: No dysuria, No frequency, No urgency, No hematuria, or incontinence  NEUROLOGICAL: No headaches, No dizziness, No numbness, No tingling, No tremors, No weakness  EXT: No Swelling, No Pain, No Edema  SKIN:  [ x ] No itching, burning, rashes, or lesions   MUSCULOSKELETAL: No joint pain or swelling; No muscle pain, +Back pain, No extremity pain  PSYCHIATRIC: No depression, anxiety, mood swings or difficulty sleeping at night  PAIN SCALE: [  ] None  [ x ] Other-4/10  ROS Unable to obtain due to - [  ] Dementia  [  ] Lethargy  [  ] Sedated [  ] non verbal  REST OF REVIEW Of SYSTEM - [ x ] Normal     Vital Signs Last 24 Hrs  T(C): 36.7 (19 Apr 2021 05:01), Max: 36.8 (18 Apr 2021 12:17)  T(F): 98.1 (19 Apr 2021 05:01), Max: 98.2 (18 Apr 2021 12:17)  HR: 72 (19 Apr 2021 05:01) (72 - 80)  BP: 95/58 (19 Apr 2021 05:01) (95/58 - 105/67)  BP(mean): --  RR: 17 (19 Apr 2021 05:01) (17 - 18)  SpO2: 96% (19 Apr 2021 05:01) (96% - 96%)  Finger Stick        04-18 @ 07:01  -  04-19 @ 07:00  --------------------------------------------------------  IN: 1250 mL / OUT: 1050 mL / NET: 200 mL        PHYSICAL EXAM:  GENERAL:  [x  ] NAD , [ x ] well appearing, [  ] Agitated, [x  ] Drowsy,  [  ] Lethargy, [  ] confused   HEAD:  [ x ] Normal, [  ] Other  EYES:  [ x ] EOMI, [x  ] PERRLA, [x  ] conjunctiva and sclera clear normal, [  ] Other,  [  ] Pallor,[  ] Discharge  ENMT:  [x  ] Normal, [  x] dry mucous membranes, [  ] Good dentition, [ x ] No Thrush  NECK:  [ x ] Supple, [x  ] No JVD, [ x ] Normal thyroid, [  ] Lymphadenopathy [  ] Other  CHEST/LUNG:  [x  ] Clear to auscultation bilaterally, [ x ] Breath Sounds equal B/L ,  [x  ] No rales, [ x ] No rhonchi  [x  ]  No wheezing  HEART:  [x  ] Regular rate and rhythm, [  ] tachycardia, [  ] Bradycardia,  [  ] irregular  [ x ] No murmurs, No rubs, No gallops, [  ] PPM in place (Mfr:  )  ABDOMEN:  [ x ] Soft, [ x ] Tender in epigastric region NO R/R/G , [x  ] Nondistended, [ x ] No mass, [ x ] Bowel sounds present, [ x ] obese  NERVOUS SYSTEM:  [x  ] Alert & Oriented X3, [ x ] Nonfocal  [  ] Confusion  [  ] Encephalopathic [  ] Sedated [  ] Unable to assess, [  ] Other-  EXTREMITIES: [x  ] 2+ Peripheral Pulses, No clubbing, No cyanosis,  [  ] edema B/L lower EXT. [  ] PVD stasis skin changes B/L Lower EXT  LYMPH: No lymphadenopathy noted  SKIN:  [x  ] No rashes or lesions, [  ] Pressure Ulcers, [  ] ecchymosis, [  ] Skin Tears, [  ] Other    DIET: Clear liquids    LABS:                        12.2   4.32  )-----------( 273      ( 19 Apr 2021 08:35 )             37.1     19 Apr 2021 08:35    140    |  107    |  4      ----------------------------<  224    3.4     |  24     |  0.60     Ca    8.8        19 Apr 2021 08:35    TPro  7.3    /  Alb  2.9    /  TBili  0.4    /  DBili  x      /  AST  220    /  ALT  271    /  AlkPhos  86     19 Apr 2021 08:35                             Anemia Panel:      Thyroid Panel:        Lipase, Serum: 151 U/L (04-16-21 @ 06:46)  Amylase, Serum Total: 22 U/L (04-16-21 @ 06:46)  Lipase, Serum: 274 U/L (04-14-21 @ 16:23)          RADIOLOGY & ADDITIONAL TESTS: none in past 24 hours      HEALTH ISSUES - PROBLEM Dx:  Acute pancreatitis    Diabetes mellitus  Insulin dependent    Transaminitis    Dyslipidemia    Autism spectrum disorder    Need for prophylactic measure            Consultant(s) Notes Reviewed:  [ x ] YES     Care Discussed with [ x ] Consultants, [ x ] Patient, [ x ] Family,- MOM -Jennifer [  ] HCP, [  ] , [  ] Social Service, [x ] RN, [  ] Physical Therapy, [  ] Palliative Care Team  DVT PPX: [ x ] Lovenox, [  ] SC Heparin, [  ] Coumadin, [  ] Xarelto, [  ] Eliquis, [  ] Pradaxa, [  ] IV Heparin drip, [  ] SCD, [  ] Ambulation, [  ] Contraindicated 2/2 GI Bleed, [  ] Contraindicated 2/2  Bleed, [  ] Contraindicated 2/2 Brain Bleed  Advanced Directive: [ x ] None, [  ] DNR/DNI Patient is a 21y old  Female who presents with a chief complaint of Acute pancreatitis (17 Apr 2021 10:12)      HPI:  Patient is a 22 yo female from [place] with PMH of autism, chronic pancreatitis, IDDM type 1 (Insulin pump present), and PCOS presents to the ED c/o epigastric pain x 2 days. Patient states that her sx are similar sx to her episodes of chronic pancreatitis. Patient has frequent flare-ups of her pancreatitis, last being a few weeks ago and did not require hospital stay. Patient was scheduled for total pancreatectomy w/ islet cell transplantation into liver this past January, but the procedure was cancelled due to COVID pandemic. Denies ETOH use, no change in medications. Patient states that her pain is well controlled s/p morphine 4mg IV in the ED. Patient notes mild nausea, which is improved s/p zofran. Denies any fevers, chills, CP, SOB, vomiting, diarrhea, constipation, or back pain. Patient received Moderna 2 of 2, last dose ~1 week ago. No known recent COVID exposures.    In the ED  VS- Temp 97.9F,  -->102, /76, RR 18, SpO2 97% on RA  Labs significant for lipase WNL, lactate 2.2, glu 222, AST 84,   CT a/p (+) acute pancreatitis, underlying chronic pancreatitis, hepatic steatosis  ECG showed sinus tachycardia, no signs of acute ischemia  s/p 1L NS, morphine 4mg IV x2, zofran 4mg IV x1 in the ED (14 Apr 2021 19:23)      INTERVAL HPI:  4/15/21: Patient admitted overnight. Patient seen and examined. Patient tired from late admission. States abdominal pain is improving, now a 4-4.5/10 in epigastric region with radiation to back. States pain regimen and heating pads have been helping her pain. Endorses intermittent nausea. No gallstones seen on imaging, patient denies EtOH use, and lipid panel only mildly elevated. NPO with IVF. Endocrine consulted - Dr C perlman ,for insulin pump.    4/16/21: No acute overnight events. Patient seen and examined. Patient tired, limited ROS given fatigue/pain. Says her abdominal pain and nausea are still present. Patient still NPO and on IVF. GI consulted, pt can be DC'd once she can tolerate PO diet. Endocrine consulted for insulin pump - pt wishes to hold off omnipod, started on Lantus 8U qam and continue Admelog scale q6hr while NPO., On IV Fluids.    4/17/21: Patient seen and examined. Patient tired, and sleeping during interview. States that abdominal pain and nausea are still present. Denies vomiting. On IVF. Pain controlled with current pain regimen. GI advanced diet to clear liquid for Diet. LFT's increasing.    4/18/21: No acute overnight events. Patient seen and examined. Patient tired, sleeping during interview. States that abdominal pain and nausea are still present. Says it worsens when she tries clear liquids. Per RN, patient barely drinking. On IVF. LFTs remain elevated.     4/19/21: No acute overnight events. Patient seen and examined. Patient is tired, sleeping during interview. States that abdominal pain and nausea are more intermittent now rather than constant but still present. Said she tried to have clear liquids yesterday when her mother was visiting but it increased her pain and nausea. On IVF. LFTs still elevated but now downtrending. K repleted.     OVERNIGHT EVENTS: NONE    Home Medications:  Allegra 180 mg oral tablet: 1 tab(s) orally once a day (14 Apr 2021 20:20)  Cranberry oral capsule: 8400 milligram(s) orally once a day (14 Apr 2021 20:20)  Creon 36,000 units oral delayed release capsule: 2 cap(s) orally 3 times a day (14 Apr 2021 20:20)  Creon 36,000 units oral delayed release capsule: 1 cap(s) orally once a day, As Needed with snacks (14 Apr 2021 20:20)  famotidine 40 mg oral tablet: 1 tab(s) orally once a day (at bedtime) (14 Apr 2021 20:20)  Flonase 50 mcg/inh nasal spray: 1 spray(s) nasal once a day (14 Apr 2021 20:20)  gabapentin 300 mg oral tablet: 1 tab(s) orally 2 times a day (14 Apr 2021 20:20)  Junel 1/20 oral tablet: 1 tab(s) orally once a day (14 Apr 2021 20:20)  Lantus 100 units/mL subcutaneous solution: Dose dependent on Freestlye Candy Sensor-14 (14 Apr 2021 20:20)  MiraLax oral powder for reconstitution: 17 gram(s) orally once a day (14 Apr 2021 20:20)  Multiple Vitamins oral tablet: 1 tab(s) orally once a day (14 Apr 2021 20:20)  NovoLOG 100 units/mL injectable solution: Sliding scale (14 Apr 2021 20:20)  ursodiol 300 mg oral capsule: 1 cap(s) orally once a day (14 Apr 2021 20:20)  Vitamin B12 1000 mcg oral tablet: 1 tab(s) orally once a day (14 Apr 2021 20:20)  Vitamin C 500 mg oral tablet: 1 tab(s) orally once a day (14 Apr 2021 20:20)  Zofran 4 mg oral tablet: 1 tab(s) orally every 6 hours, As Needed (14 Apr 2021 20:20)      MEDICATIONS  (STANDING):  ascorbic acid 500 milliGRAM(s) Oral daily  cyanocobalamin 1000 MICROGram(s) Oral daily  dextrose 40% Gel 15 Gram(s) Oral once  dextrose 5% + sodium chloride 0.9%. 1000 milliLiter(s) (100 mL/Hr) IV Continuous <Continuous>  dextrose 5%. 1000 milliLiter(s) (50 mL/Hr) IV Continuous <Continuous>  dextrose 5%. 1000 milliLiter(s) (100 mL/Hr) IV Continuous <Continuous>  dextrose 50% Injectable 25 Gram(s) IV Push once  dextrose 50% Injectable 12.5 Gram(s) IV Push once  dextrose 50% Injectable 25 Gram(s) IV Push once  enoxaparin Injectable 40 milliGRAM(s) SubCutaneous daily  famotidine    Tablet 40 milliGRAM(s) Oral two times a day  gabapentin 300 milliGRAM(s) Oral two times a day  glucagon  Injectable 1 milliGRAM(s) IntraMuscular once  insulin glargine Injectable (LANTUS) 10 Unit(s) SubCutaneous every morning  insulin lispro (ADMELOG) corrective regimen sliding scale   SubCutaneous every 6 hours  loratadine 10 milliGRAM(s) Oral daily  multivitamin 1 Tablet(s) Oral daily  pancrelipase  (CREON 36,000 Lipase Units) 2 Capsule(s) Oral three times a day with meals  polyethylene glycol 3350 17 Gram(s) Oral daily  potassium chloride   Powder 40 milliEquivalent(s) Oral once  ursodiol Capsule 300 milliGRAM(s) Oral <User Schedule>    MEDICATIONS  (PRN):  fluticasone propionate 50 MICROgram(s)/spray Nasal Spray 1 Spray(s) Both Nostrils two times a day PRN nasal congestion  morphine  - Injectable 2 milliGRAM(s) IV Push every 4 hours PRN Moderate Pain (4 - 6)  morphine  - Injectable 3 milliGRAM(s) IV Push every 4 hours PRN Severe Pain (7 - 10)  morphine  - Injectable 1 milliGRAM(s) IV Push every 3 hours PRN Mild Pain (1 - 3)  ondansetron    Tablet 4 milliGRAM(s) Oral every 6 hours PRN Nausea and/or Vomiting  pancrelipase  (CREON 36,000 Lipase Units) 1 Capsule(s) Oral daily PRN w/ snacks      Allergies    No Known Allergies    Intolerances        REVIEW OF SYSTEMS: Limited due to fatigue/pain  CONSTITUTIONAL: No fever, No chills, +Fatigue, No myalgia, No Body ache, No Weakness  EYES: No eye pain,  No visual disturbances, No discharge, NO Redness  ENMT:  No ear pain, No nose bleed, No vertigo; No sinus or throat pain, No Congestion  NECK: No pain, No stiffness  RESPIRATORY: No cough, wheezing, No  hemoptysis, No shortness of breath  CARDIOVASCULAR: No chest pain, palpitations  GASTROINTESTINAL: +Epigastric pain with radiation to the back, more intermittent now rather than constant. +Nausea, more intermittent now rather than constant. No vomiting. No diarrhea or constipation. [ 0 ] BM  GENITOURINARY: No dysuria, No frequency, No urgency, No hematuria, or incontinence  NEUROLOGICAL: No headaches, No dizziness, No numbness, No tingling, No tremors, No weakness  EXT: No Swelling, No Pain, No Edema  SKIN:  [ x ] No itching, burning, rashes, or lesions   MUSCULOSKELETAL: No joint pain or swelling; No muscle pain, +Back pain, No extremity pain  PSYCHIATRIC: No depression, anxiety, mood swings or difficulty sleeping at night  PAIN SCALE: [  ] None  [ x ] Other-4/10  ROS Unable to obtain due to - [  ] Dementia  [  ] Lethargy  [  ] Sedated [  ] non verbal  REST OF REVIEW Of SYSTEM - [ x ] Normal     Vital Signs Last 24 Hrs  T(C): 36.7 (19 Apr 2021 05:01), Max: 36.8 (18 Apr 2021 12:17)  T(F): 98.1 (19 Apr 2021 05:01), Max: 98.2 (18 Apr 2021 12:17)  HR: 72 (19 Apr 2021 05:01) (72 - 80)  BP: 95/58 (19 Apr 2021 05:01) (95/58 - 105/67)  BP(mean): --  RR: 17 (19 Apr 2021 05:01) (17 - 18)  SpO2: 96% (19 Apr 2021 05:01) (96% - 96%)  Finger Stick        04-18 @ 07:01  -  04-19 @ 07:00  --------------------------------------------------------  IN: 1250 mL / OUT: 1050 mL / NET: 200 mL        PHYSICAL EXAM:  GENERAL:  [x  ] NAD , [ x ] well appearing, [  ] Agitated, [x  ] Drowsy,  [  ] Lethargy, [  ] confused   HEAD:  [ x ] Normal, [  ] Other  EYES:  [ x ] EOMI, [x  ] PERRLA, [x  ] conjunctiva and sclera clear normal, [  ] Other,  [  ] Pallor,[  ] Discharge  ENMT:  [x  ] Normal, [  x] dry mucous membranes, [  ] Good dentition, [ x ] No Thrush  NECK:  [ x ] Supple, [x  ] No JVD, [ x ] Normal thyroid, [  ] Lymphadenopathy [  ] Other  CHEST/LUNG:  [x  ] Clear to auscultation bilaterally, [ x ] Breath Sounds equal B/L ,  [x  ] No rales, [ x ] No rhonchi  [x  ]  No wheezing  HEART:  [x  ] Regular rate and rhythm, [  ] tachycardia, [  ] Bradycardia,  [  ] irregular  [ x ] No murmurs, No rubs, No gallops, [  ] PPM in place (Mfr:  )  ABDOMEN:  [ x ] Soft, [ x ] Tender in epigastric region NO R/R/G , [x  ] Nondistended, [ x ] No mass, [ x ] Bowel sounds present, [ x ] obese  NERVOUS SYSTEM:  [x  ] Alert & Oriented X3, [ x ] Nonfocal  [  ] Confusion  [  ] Encephalopathic [  ] Sedated [  ] Unable to assess, [  ] Other-  EXTREMITIES: [x  ] 2+ Peripheral Pulses, No clubbing, No cyanosis,  [  ] edema B/L lower EXT. [  ] PVD stasis skin changes B/L Lower EXT  LYMPH: No lymphadenopathy noted  SKIN:  [x  ] No rashes or lesions, [  ] Pressure Ulcers, [  ] ecchymosis, [  ] Skin Tears, [  ] Other    DIET: Clear liquids    LABS:                        12.2   4.32  )-----------( 273      ( 19 Apr 2021 08:35 )             37.1     19 Apr 2021 08:35    140    |  107    |  4      ----------------------------<  224    3.4     |  24     |  0.60     Ca    8.8        19 Apr 2021 08:35    TPro  7.3    /  Alb  2.9    /  TBili  0.4    /  DBili  x      /  AST  220    /  ALT  271    /  AlkPhos  86     19 Apr 2021 08:35                             Anemia Panel:      Thyroid Panel:        Lipase, Serum: 151 U/L (04-16-21 @ 06:46)  Amylase, Serum Total: 22 U/L (04-16-21 @ 06:46)  Lipase, Serum: 274 U/L (04-14-21 @ 16:23)    RADIOLOGY & ADDITIONAL TESTS: none in past 24 hours      HEALTH ISSUES - PROBLEM Dx:  Acute pancreatitis    Diabetes mellitus  Insulin dependent    Transaminitis    Dyslipidemia    Autism spectrum disorder    Need for prophylactic measure        Consultant(s) Notes Reviewed:  [ x ] YES     Care Discussed with [ x ] Consultants, [ x ] Patient, [ x ] Family,- MOM -Jennifer [  ] HCP, [  ] , [  ] Social Service, [x ] RN, [  ] Physical Therapy, [  ] Palliative Care Team  DVT PPX: [ x ] Lovenox, [  ] SC Heparin, [  ] Coumadin, [  ] Xarelto, [  ] Eliquis, [  ] Pradaxa, [  ] IV Heparin drip, [  ] SCD, [  ] Ambulation, [  ] Contraindicated 2/2 GI Bleed, [  ] Contraindicated 2/2  Bleed, [  ] Contraindicated 2/2 Brain Bleed  Advanced Directive: [ x ] None, [  ] DNR/DNI

## 2021-04-19 NOTE — PROGRESS NOTE ADULT - NSICDXPROBLEMPLAN3_GEN_ALL_CORE_FT
- LFTs elevated but now downtrending   - RUQ US: hepatic steatosis, no gallstones  - Will trend  - GI consulted- Dr Carrasco follow up

## 2021-04-20 ENCOUNTER — TRANSCRIPTION ENCOUNTER (OUTPATIENT)
Age: 21
End: 2021-04-20

## 2021-04-20 LAB
ALBUMIN SERPL ELPH-MCNC: 3.2 G/DL — LOW (ref 3.3–5)
ALP SERPL-CCNC: 92 U/L — SIGNIFICANT CHANGE UP (ref 40–120)
ALT FLD-CCNC: 278 U/L — HIGH (ref 12–78)
ANION GAP SERPL CALC-SCNC: 8 MMOL/L — SIGNIFICANT CHANGE UP (ref 5–17)
AST SERPL-CCNC: 191 U/L — HIGH (ref 15–37)
BASOPHILS # BLD AUTO: 0.02 K/UL — SIGNIFICANT CHANGE UP (ref 0–0.2)
BASOPHILS NFR BLD AUTO: 0.3 % — SIGNIFICANT CHANGE UP (ref 0–2)
BILIRUB SERPL-MCNC: 0.4 MG/DL — SIGNIFICANT CHANGE UP (ref 0.2–1.2)
BUN SERPL-MCNC: 4 MG/DL — LOW (ref 7–23)
CALCIUM SERPL-MCNC: 9 MG/DL — SIGNIFICANT CHANGE UP (ref 8.5–10.1)
CHLORIDE SERPL-SCNC: 107 MMOL/L — SIGNIFICANT CHANGE UP (ref 96–108)
CO2 SERPL-SCNC: 25 MMOL/L — SIGNIFICANT CHANGE UP (ref 22–31)
CREAT SERPL-MCNC: 0.69 MG/DL — SIGNIFICANT CHANGE UP (ref 0.5–1.3)
EOSINOPHIL # BLD AUTO: 0.2 K/UL — SIGNIFICANT CHANGE UP (ref 0–0.5)
EOSINOPHIL NFR BLD AUTO: 3.2 % — SIGNIFICANT CHANGE UP (ref 0–6)
GLUCOSE SERPL-MCNC: 241 MG/DL — HIGH (ref 70–99)
HCT VFR BLD CALC: 37.6 % — SIGNIFICANT CHANGE UP (ref 34.5–45)
HGB BLD-MCNC: 12.7 G/DL — SIGNIFICANT CHANGE UP (ref 11.5–15.5)
IMM GRANULOCYTES NFR BLD AUTO: 0.2 % — SIGNIFICANT CHANGE UP (ref 0–1.5)
LYMPHOCYTES # BLD AUTO: 3.09 K/UL — SIGNIFICANT CHANGE UP (ref 1–3.3)
LYMPHOCYTES # BLD AUTO: 49.8 % — HIGH (ref 13–44)
MCHC RBC-ENTMCNC: 29.8 PG — SIGNIFICANT CHANGE UP (ref 27–34)
MCHC RBC-ENTMCNC: 33.8 GM/DL — SIGNIFICANT CHANGE UP (ref 32–36)
MCV RBC AUTO: 88.3 FL — SIGNIFICANT CHANGE UP (ref 80–100)
MONOCYTES # BLD AUTO: 0.45 K/UL — SIGNIFICANT CHANGE UP (ref 0–0.9)
MONOCYTES NFR BLD AUTO: 7.2 % — SIGNIFICANT CHANGE UP (ref 2–14)
NEUTROPHILS # BLD AUTO: 2.44 K/UL — SIGNIFICANT CHANGE UP (ref 1.8–7.4)
NEUTROPHILS NFR BLD AUTO: 39.3 % — LOW (ref 43–77)
NRBC # BLD: 0 /100 WBCS — SIGNIFICANT CHANGE UP (ref 0–0)
PLATELET # BLD AUTO: 300 K/UL — SIGNIFICANT CHANGE UP (ref 150–400)
POTASSIUM SERPL-MCNC: 3.3 MMOL/L — LOW (ref 3.5–5.3)
POTASSIUM SERPL-SCNC: 3.3 MMOL/L — LOW (ref 3.5–5.3)
PROT SERPL-MCNC: 7.8 G/DL — SIGNIFICANT CHANGE UP (ref 6–8.3)
RBC # BLD: 4.26 M/UL — SIGNIFICANT CHANGE UP (ref 3.8–5.2)
RBC # FLD: 12.5 % — SIGNIFICANT CHANGE UP (ref 10.3–14.5)
SODIUM SERPL-SCNC: 140 MMOL/L — SIGNIFICANT CHANGE UP (ref 135–145)
WBC # BLD: 6.21 K/UL — SIGNIFICANT CHANGE UP (ref 3.8–10.5)
WBC # FLD AUTO: 6.21 K/UL — SIGNIFICANT CHANGE UP (ref 3.8–10.5)

## 2021-04-20 RX ORDER — INSULIN LISPRO 100/ML
2 VIAL (ML) SUBCUTANEOUS
Refills: 0 | Status: DISCONTINUED | OUTPATIENT
Start: 2021-04-20 | End: 2021-04-21

## 2021-04-20 RX ORDER — ACETAMINOPHEN 500 MG
650 TABLET ORAL EVERY 6 HOURS
Refills: 0 | Status: DISCONTINUED | OUTPATIENT
Start: 2021-04-20 | End: 2021-04-22

## 2021-04-20 RX ORDER — MORPHINE SULFATE 50 MG/1
1 CAPSULE, EXTENDED RELEASE ORAL EVERY 4 HOURS
Refills: 0 | Status: DISCONTINUED | OUTPATIENT
Start: 2021-04-20 | End: 2021-04-22

## 2021-04-20 RX ORDER — INSULIN GLARGINE 100 [IU]/ML
12 INJECTION, SOLUTION SUBCUTANEOUS EVERY MORNING
Refills: 0 | Status: DISCONTINUED | OUTPATIENT
Start: 2021-04-20 | End: 2021-04-21

## 2021-04-20 RX ORDER — TRAMADOL HYDROCHLORIDE 50 MG/1
50 TABLET ORAL EVERY 6 HOURS
Refills: 0 | Status: DISCONTINUED | OUTPATIENT
Start: 2021-04-20 | End: 2021-04-22

## 2021-04-20 RX ORDER — POTASSIUM CHLORIDE 20 MEQ
40 PACKET (EA) ORAL EVERY 4 HOURS
Refills: 0 | Status: COMPLETED | OUTPATIENT
Start: 2021-04-20 | End: 2021-04-20

## 2021-04-20 RX ADMIN — Medication 2 CAPSULE(S): at 17:16

## 2021-04-20 RX ADMIN — ENOXAPARIN SODIUM 40 MILLIGRAM(S): 100 INJECTION SUBCUTANEOUS at 11:50

## 2021-04-20 RX ADMIN — POLYETHYLENE GLYCOL 3350 17 GRAM(S): 17 POWDER, FOR SOLUTION ORAL at 11:50

## 2021-04-20 RX ADMIN — URSODIOL 300 MILLIGRAM(S): 250 TABLET, FILM COATED ORAL at 05:44

## 2021-04-20 RX ADMIN — Medication 1 TABLET(S): at 11:50

## 2021-04-20 RX ADMIN — Medication 2: at 17:02

## 2021-04-20 RX ADMIN — INSULIN GLARGINE 12 UNIT(S): 100 INJECTION, SOLUTION SUBCUTANEOUS at 08:33

## 2021-04-20 RX ADMIN — FAMOTIDINE 40 MILLIGRAM(S): 10 INJECTION INTRAVENOUS at 05:44

## 2021-04-20 RX ADMIN — Medication 500 MILLIGRAM(S): at 11:50

## 2021-04-20 RX ADMIN — Medication 2: at 21:09

## 2021-04-20 RX ADMIN — Medication 2 UNIT(S): at 11:41

## 2021-04-20 RX ADMIN — FAMOTIDINE 40 MILLIGRAM(S): 10 INJECTION INTRAVENOUS at 17:16

## 2021-04-20 RX ADMIN — TRAMADOL HYDROCHLORIDE 50 MILLIGRAM(S): 50 TABLET ORAL at 12:18

## 2021-04-20 RX ADMIN — Medication 2 UNIT(S): at 17:02

## 2021-04-20 RX ADMIN — Medication 2: at 06:52

## 2021-04-20 RX ADMIN — GABAPENTIN 300 MILLIGRAM(S): 400 CAPSULE ORAL at 17:16

## 2021-04-20 RX ADMIN — LORATADINE 10 MILLIGRAM(S): 10 TABLET ORAL at 11:50

## 2021-04-20 RX ADMIN — Medication 3: at 11:41

## 2021-04-20 RX ADMIN — Medication 40 MILLIEQUIVALENT(S): at 13:11

## 2021-04-20 RX ADMIN — Medication 2 CAPSULE(S): at 11:50

## 2021-04-20 RX ADMIN — Medication 40 MILLIEQUIVALENT(S): at 10:51

## 2021-04-20 RX ADMIN — PREGABALIN 1000 MICROGRAM(S): 225 CAPSULE ORAL at 11:50

## 2021-04-20 RX ADMIN — GABAPENTIN 300 MILLIGRAM(S): 400 CAPSULE ORAL at 05:44

## 2021-04-20 RX ADMIN — ONDANSETRON 4 MILLIGRAM(S): 8 TABLET, FILM COATED ORAL at 02:20

## 2021-04-20 RX ADMIN — TRAMADOL HYDROCHLORIDE 50 MILLIGRAM(S): 50 TABLET ORAL at 12:45

## 2021-04-20 RX ADMIN — Medication 2 CAPSULE(S): at 07:41

## 2021-04-20 RX ADMIN — MORPHINE SULFATE 1 MILLIGRAM(S): 50 CAPSULE, EXTENDED RELEASE ORAL at 03:02

## 2021-04-20 NOTE — PROGRESS NOTE ADULT - ASSESSMENT
acute on chronic pancreatitis       clears as nusrat  low fat diet in am  d/w patient  dc planning once tolerating po  patient with recent eus at [facility]  being eval for pancreatectomy and islet cell transplant at [facility]    Advanced care planning was discussed with patient and family.  Advanced care planning forms were reviewed and discussed.  Risks, benefits and alternatives of gastroenterologic procedures were discussed in detail and all questions were answered.    30 minutes spent.

## 2021-04-20 NOTE — DISCHARGE NOTE PROVIDER - CARE PROVIDER_API CALL
[facility]  FAMILY MEDICINE  [address] [place] [address]  Phone: [phone]  Fax: [phone]  Follow Up Time:     Veronica Hernandez)  EndocrinologyMetabDiabetes; Internal Medicine  [address] [place] [address]  Phone: [phone]  Fax: [phone]  Follow Up Time:     Rodrigo Ireland)  Gastroenterology; Internal Medicine  [address] [place] [address], [address]  [place] [address]  Phone: [phone]  Fax: [phone]  Follow Up Time:

## 2021-04-20 NOTE — DISCHARGE NOTE PROVIDER - NSDCCPCAREPLAN_GEN_ALL_CORE_FT
PRINCIPAL DISCHARGE DIAGNOSIS  Diagnosis: Acute pancreatitis, unspecified complication status, unspecified pancreatitis type  Assessment and Plan of Treatment: You were admitted for acute on chronic pancreatitis. Your symptoms improved during your hospitalization.  - Follow up with gastroenterology within 1 week of discharge  - CONTINUE pancreatic enzyme supplements      SECONDARY DISCHARGE DIAGNOSES  Diagnosis: Diabetes mellitus  Assessment and Plan of Treatment: - CONTINUE insulin pump for management of your diabetes mellitus  - Follow up with your endocrinologist within 1 week of discharge    Diagnosis: Transaminitis  Assessment and Plan of Treatment: You were found on labs to have elevated liver enzymes. These decreased during your hospitalization. Your medical imaging was negative for gallstones as a potential cause. You were found on ultrasound to have hepatic steatosis (fatty liver).  - Follow up with gastroenterology within 1 week of discharge.    Diagnosis: Dyslipidemia  Assessment and Plan of Treatment: You were found on workup to have mildly elevated cholesterol.   - Follow up with your primary care physician within 1 week of discharge.     PRINCIPAL DISCHARGE DIAGNOSIS  Diagnosis: Acute pancreatitis, unspecified complication status, unspecified pancreatitis type  Assessment and Plan of Treatment: You were admitted for acute on chronic pancreatitis. Your symptoms improved during your hospitalization.  - Follow up with gastroenterology within 1 week of discharge  - CONTINUE pancreatic enzyme supplements      SECONDARY DISCHARGE DIAGNOSES  Diagnosis: Diabetes mellitus  Assessment and Plan of Treatment: - CONTINUE insulin pump for management of your diabetes mellitus  - Follow up with your endocrinologist within 1 week of discharge    Diagnosis: Transaminitis  Assessment and Plan of Treatment: You were found on labs to have elevated liver enzymes. These decreased during your hospitalization. Your medical imaging was negative for gallstones as a potential cause. You were found on ultrasound to have hepatic steatosis (fatty liver).  - Follow up with gastroenterology within 1 week of discharge.    Diagnosis: Dyslipidemia  Assessment and Plan of Treatment: You were found on workup to have mildly elevated cholesterol.   - Please follow a low fat/low cholesterol diet   - Follow up with your primary care physician within 1 week of discharge.     PRINCIPAL DISCHARGE DIAGNOSIS  Diagnosis: Acute pancreatitis, unspecified complication status, unspecified pancreatitis type  Assessment and Plan of Treatment: You were admitted for acute on chronic pancreatitis.  - Your symptoms improved during your hospitalization.  - Follow up with your gastroenterology within 1 week of discharge  - CONTINUE pancreatic enzyme supplements 3x day before each meals  -Pain meds Neurontin 300 mg 2x day  -Matt 300 mg daily  -Continue Low Fat, Low cholesterol Full liquid diet advance as tolerated..      SECONDARY DISCHARGE DIAGNOSES  Diagnosis: Diabetes mellitus  Assessment and Plan of Treatment: - CONTINUE insulin pump for management of your diabetes mellitus  - Follow up with your endocrinologist within 1 week of discharge  -You know how to restart your home Insulin Pump as before  -Low Carb diet    Diagnosis: Dyslipidemia  Assessment and Plan of Treatment: You were found on workup to have mildly elevated cholesterol.   - Please follow a low fat/low cholesterol diet   - Follow up with your primary care physician within 1 week of discharge.    Diagnosis: Transaminitis  Assessment and Plan of Treatment: You were found on labs to have elevated liver enzymes. These decreased during your hospitalization. Your medical imaging was negative for gallstones as a potential cause. You were found on ultrasound to have hepatic steatosis (fatty liver).  - Follow up with gastroenterology within 1 week of discharge.    Diagnosis: Autism spectrum disorder  Assessment and Plan of Treatment: stable

## 2021-04-20 NOTE — PROGRESS NOTE ADULT - ATTENDING COMMENTS
Patient is a 22 yo female from [place] with PMH of autism, chronic pancreatitis, IDDM type 1 presents to the ED c/o epigastric pain x past several days admitted for acute pancreatitis .improving   Pt seen, examined, case & care plan d/w pt, residents at detail.  D/W GI- DR Ladd, continue current care,   IV Fluids, Clear liquid diet as per GI, Ambulation   Total care time is 40 minutes. Patient is a 20 yo female from senior living with PMH of autism, chronic pancreatitis, IDDM type 1 presents to the ED c/o epigastric pain x past several days admitted for acute pancreatitis .improving   Pt seen, examined, case & care plan d/w pt, residents at detail.  D/W GI- DR Ladd, continue current care,   IV Fluids, Clear liquid diet as per GI, Ambulation   Total care time is 40 minutes.  case D/W Pt's primary  pain management MD -[phone]

## 2021-04-20 NOTE — PROGRESS NOTE ADULT - SUBJECTIVE AND OBJECTIVE BOX
CAPILLARY BLOOD GLUCOSE      POCT Blood Glucose.: 230 mg/dL (20 Apr 2021 06:15)  POCT Blood Glucose.: 251 mg/dL (19 Apr 2021 23:51)  POCT Blood Glucose.: 236 mg/dL (19 Apr 2021 16:42)  POCT Blood Glucose.: 248 mg/dL (19 Apr 2021 11:35)  POCT Blood Glucose.: 212 mg/dL (19 Apr 2021 09:38)      Vital Signs Last 24 Hrs  T(C): 36.4 (20 Apr 2021 05:24), Max: 36.8 (19 Apr 2021 13:27)  T(F): 97.6 (20 Apr 2021 05:24), Max: 98.3 (19 Apr 2021 13:27)  HR: 80 (20 Apr 2021 05:24) (68 - 113)  BP: 108/62 (20 Apr 2021 05:24) (97/64 - 108/62)  BP(mean): --  RR: 17 (20 Apr 2021 05:24) (17 - 17)  SpO2: 93% (20 Apr 2021 05:24) (92% - 94%)    General: WN/WD NAD  Respiratory: CTA B/L  CV: RRR, S1S2, no murmurs, rubs or gallops  Abdominal: Soft, NT, ND +BS, Last BM  Extremities: No edema, + peripheral pulses     04-19    140  |  107  |  4<L>  ----------------------------<  224<H>  3.4<L>   |  24  |  0.60    Ca    8.8      19 Apr 2021 08:35    TPro  7.3  /  Alb  2.9<L>  /  TBili  0.4  /  DBili  x   /  AST  220<H>  /  ALT  271<H>  /  AlkPhos  86  04-19      dextrose 40% Gel 15 Gram(s) Oral once  dextrose 50% Injectable 25 Gram(s) IV Push once  dextrose 50% Injectable 12.5 Gram(s) IV Push once  dextrose 50% Injectable 25 Gram(s) IV Push once  glucagon  Injectable 1 milliGRAM(s) IntraMuscular once  insulin glargine Injectable (LANTUS) 10 Unit(s) SubCutaneous every morning  insulin lispro (ADMELOG) corrective regimen sliding scale   SubCutaneous Before meals and at bedtime

## 2021-04-20 NOTE — PROGRESS NOTE ADULT - SUBJECTIVE AND OBJECTIVE BOX
Patient is a 21y old  Female who presents with a chief complaint of Acute pancreatitis (17 Apr 2021 10:12)      HPI:  Patient is a 22 yo female from [facility] with PMH of autism, chronic pancreatitis, IDDM type 1 (Insulin pump present), and PCOS presents to the ED c/o epigastric pain x 2 days. Patient states that her sx are similar sx to her episodes of chronic pancreatitis. Patient has frequent flare-ups of her pancreatitis, last being a few weeks ago and did not require hospital stay. Patient was scheduled for total pancreatectomy w/ islet cell transplantation into liver this past January, but the procedure was cancelled due to COVID pandemic. Denies ETOH use, no change in medications. Patient states that her pain is well controlled s/p morphine 4mg IV in the ED. Patient notes mild nausea, which is improved s/p zofran. Denies any fevers, chills, CP, SOB, vomiting, diarrhea, constipation, or back pain. Patient received Moderna 2 of 2, last dose ~1 week ago. No known recent COVID exposures.    In the ED  VS- Temp 97.9F,  -->102, /76, RR 18, SpO2 97% on RA  Labs significant for lipase WNL, lactate 2.2, glu 222, AST 84,   CT a/p (+) acute pancreatitis, underlying chronic pancreatitis, hepatic steatosis  ECG showed sinus tachycardia, no signs of acute ischemia  s/p 1L NS, morphine 4mg IV x2, zofran 4mg IV x1 in the ED (14 Apr 2021 19:23)      INTERVAL HPI:  4/15/21: Patient admitted overnight. Patient seen and examined. Patient tired from late admission. States abdominal pain is improving, now a 4-4.5/10 in epigastric region with radiation to back. States pain regimen and heating pads have been helping her pain. Endorses intermittent nausea. No gallstones seen on imaging, patient denies EtOH use, and lipid panel only mildly elevated. NPO with IVF. Endocrine consulted - Dr C perlman ,for insulin pump.    4/16/21: No acute overnight events. Patient seen and examined. Patient tired, limited ROS given fatigue/pain. Says her abdominal pain and nausea are still present. Patient still NPO and on IVF. GI consulted, pt can be DC'd once she can tolerate PO diet. Endocrine consulted for insulin pump - pt wishes to hold off omnipod, started on Lantus 8U qam and continue Admelog scale q6hr while NPO., On IV Fluids.    4/17/21: Patient seen and examined. Patient tired, and sleeping during interview. States that abdominal pain and nausea are still present. Denies vomiting. On IVF. Pain controlled with current pain regimen. GI advanced diet to clear liquid for Diet. LFT's increasing.    4/18/21: No acute overnight events. Patient seen and examined. Patient tired, sleeping during interview. States that abdominal pain and nausea are still present. Says it worsens when she tries clear liquids. Per RN, patient barely drinking. On IVF. LFTs remain elevated.     4/19/21: No acute overnight events. Patient seen and examined. Patient is tired, sleeping during interview. States that abdominal pain and nausea are more intermittent now rather than constant but still present. Said she tried to have clear liquids yesterday when her mother was visiting but it increased her pain and nausea. On IVF. LFTs still elevated but now downtrending. K repleted.     4/20/21: No acute overnight events. Patient seen and examined. Patient more awake and responsive this AM. States that her abdominal pain is a 1-2/10 this morning, with intermittent episodes of sharp pain that resolve after a few seconds. She is requiring less pain medications. She had more jello and tea yesterday, says she gets some pain with food. Patient endorses having intermittent nausea that relieves with Zofran, states she takes Zofran at home. Blood sugars still elevated, per endo will increase Lantus to 12 U qam and will add admelog 2U tid. K repleted.       OVERNIGHT EVENTS: NONE    Home Medications:  Allegra 180 mg oral tablet: 1 tab(s) orally once a day (14 Apr 2021 20:20)  Cranberry oral capsule: 8400 milligram(s) orally once a day (14 Apr 2021 20:20)  Creon 36,000 units oral delayed release capsule: 2 cap(s) orally 3 times a day (14 Apr 2021 20:20)  Creon 36,000 units oral delayed release capsule: 1 cap(s) orally once a day, As Needed with snacks (14 Apr 2021 20:20)  famotidine 40 mg oral tablet: 1 tab(s) orally once a day (at bedtime) (14 Apr 2021 20:20)  Flonase 50 mcg/inh nasal spray: 1 spray(s) nasal once a day (14 Apr 2021 20:20)  gabapentin 300 mg oral tablet: 1 tab(s) orally 2 times a day (14 Apr 2021 20:20)  Junel 1/20 oral tablet: 1 tab(s) orally once a day (14 Apr 2021 20:20)  Lantus 100 units/mL subcutaneous solution: Dose dependent on Freestlye Candy Sensor-14 (14 Apr 2021 20:20)  MiraLax oral powder for reconstitution: 17 gram(s) orally once a day (14 Apr 2021 20:20)  Multiple Vitamins oral tablet: 1 tab(s) orally once a day (14 Apr 2021 20:20)  NovoLOG 100 units/mL injectable solution: Sliding scale (14 Apr 2021 20:20)  ursodiol 300 mg oral capsule: 1 cap(s) orally once a day (14 Apr 2021 20:20)  Vitamin B12 1000 mcg oral tablet: 1 tab(s) orally once a day (14 Apr 2021 20:20)  Vitamin C 500 mg oral tablet: 1 tab(s) orally once a day (14 Apr 2021 20:20)  Zofran 4 mg oral tablet: 1 tab(s) orally every 6 hours, As Needed (14 Apr 2021 20:20)      MEDICATIONS  (STANDING):  ascorbic acid 500 milliGRAM(s) Oral daily  cyanocobalamin 1000 MICROGram(s) Oral daily  dextrose 40% Gel 15 Gram(s) Oral once  dextrose 5% + sodium chloride 0.9%. 1000 milliLiter(s) (100 mL/Hr) IV Continuous <Continuous>  dextrose 5%. 1000 milliLiter(s) (50 mL/Hr) IV Continuous <Continuous>  dextrose 5%. 1000 milliLiter(s) (100 mL/Hr) IV Continuous <Continuous>  dextrose 50% Injectable 25 Gram(s) IV Push once  dextrose 50% Injectable 12.5 Gram(s) IV Push once  dextrose 50% Injectable 25 Gram(s) IV Push once  enoxaparin Injectable 40 milliGRAM(s) SubCutaneous daily  famotidine    Tablet 40 milliGRAM(s) Oral two times a day  gabapentin 300 milliGRAM(s) Oral two times a day  glucagon  Injectable 1 milliGRAM(s) IntraMuscular once  insulin glargine Injectable (LANTUS) 12 Unit(s) SubCutaneous every morning  insulin lispro (ADMELOG) corrective regimen sliding scale   SubCutaneous Before meals and at bedtime  insulin lispro Injectable (ADMELOG) 2 Unit(s) SubCutaneous three times a day before meals  loratadine 10 milliGRAM(s) Oral daily  multivitamin 1 Tablet(s) Oral daily  pancrelipase  (CREON 36,000 Lipase Units) 2 Capsule(s) Oral three times a day with meals  polyethylene glycol 3350 17 Gram(s) Oral daily  potassium chloride   Powder 40 milliEquivalent(s) Oral every 4 hours  ursodiol Capsule 300 milliGRAM(s) Oral <User Schedule>    MEDICATIONS  (PRN):  fluticasone propionate 50 MICROgram(s)/spray Nasal Spray 1 Spray(s) Both Nostrils two times a day PRN nasal congestion  morphine  - Injectable 0.5 milliGRAM(s) IV Push every 3 hours PRN Mild Pain (1 - 3)  morphine  - Injectable 1 milliGRAM(s) IV Push every 4 hours PRN Moderate Pain (4 - 6)  morphine  - Injectable 2 milliGRAM(s) IV Push every 4 hours PRN Severe Pain (7 - 10)  ondansetron    Tablet 4 milliGRAM(s) Oral every 6 hours PRN Nausea and/or Vomiting  pancrelipase  (CREON 36,000 Lipase Units) 1 Capsule(s) Oral daily PRN w/ snacks      Allergies    No Known Allergies    Intolerances        REVIEW OF SYSTEMS: "Still having discomfort but feeling better"  CONSTITUTIONAL: No fever, No chills, No fatigue, No myalgia, No Body ache, No Weakness  EYES: No eye pain,  No visual disturbances, No discharge, NO Redness  ENMT:  No ear pain, No nose bleed, No vertigo; No sinus or throat pain, No Congestion  NECK: No pain, No stiffness  RESPIRATORY: No cough, wheezing, No  hemoptysis, No shortness of breath  CARDIOVASCULAR: No chest pain, palpitations  GASTROINTESTINAL: +Epigastric discomfort with radiation to the back with intermittent sharp pain. +Nausea that relieves with zofran. No vomiting. No diarrhea or constipation. [ 0 ] BM  GENITOURINARY: No dysuria, No frequency, No urgency, No hematuria, or incontinence  NEUROLOGICAL: No headaches, No dizziness, No numbness, No tingling, No tremors, No weakness  EXT: No Swelling, No Pain, No Edema  SKIN:  [ x ] No itching, burning, rashes, or lesions   MUSCULOSKELETAL: No joint pain or swelling; No muscle pain, +Back pain, No extremity pain  PSYCHIATRIC: No depression, anxiety, mood swings or difficulty sleeping at night  PAIN SCALE: [  ] None  [ x ] Other-1-2/10  ROS Unable to obtain due to - [  ] Dementia  [  ] Lethargy  [  ] Sedated [  ] non verbal  REST OF REVIEW Of SYSTEM - [ x ] Normal     Vital Signs Last 24 Hrs  T(C): 36.4 (20 Apr 2021 05:24), Max: 36.8 (19 Apr 2021 13:27)  T(F): 97.6 (20 Apr 2021 05:24), Max: 98.3 (19 Apr 2021 13:27)  HR: 80 (20 Apr 2021 05:24) (68 - 113)  BP: 108/62 (20 Apr 2021 05:24) (97/64 - 108/62)  BP(mean): --  RR: 17 (20 Apr 2021 05:24) (17 - 17)  SpO2: 93% (20 Apr 2021 05:24) (92% - 94%)  Finger Stick        04-19 @ 07:01  -  04-20 @ 07:00  --------------------------------------------------------  IN: 0 mL / OUT: 850 mL / NET: -850 mL        PHYSICAL EXAM:  GENERAL:  [x  ] NAD , [ x ] well appearing, [  ] Agitated, [  ] Drowsy,  [  ] Lethargy, [  ] confused   HEAD:  [ x ] Normal, [  ] Other  EYES:  [ x ] EOMI, [x  ] PERRLA, [x  ] conjunctiva and sclera clear normal, [  ] Other,  [  ] Pallor,[  ] Discharge  ENMT:  [x  ] Normal, [  x] dry mucous membranes, [  ] Good dentition, [ x ] No Thrush  NECK:  [ x ] Supple, [x  ] No JVD, [ x ] Normal thyroid, [  ] Lymphadenopathy [  ] Other  CHEST/LUNG:  [x  ] Clear to auscultation bilaterally, [ x ] Breath Sounds equal B/L ,  [x  ] No rales, [ x ] No rhonchi  [x  ]  No wheezing  HEART:  [x  ] Regular rate and rhythm, [  ] tachycardia, [  ] Bradycardia,  [  ] irregular  [ x ] No murmurs, No rubs, No gallops, [  ] PPM in place (Mfr:  )  ABDOMEN:  [ x ] Soft, [ x ] Mild tenderness in epigastric region NO R/R/G , [x  ] Nondistended, [ x ] No mass, [ x ] Bowel sounds present, [ x ] obese  NERVOUS SYSTEM:  [x  ] Alert & Oriented X3, [ x ] Nonfocal  [  ] Confusion  [  ] Encephalopathic [  ] Sedated [  ] Unable to assess, [  ] Other-  EXTREMITIES: [x  ] 2+ Peripheral Pulses, No clubbing, No cyanosis,  [  ] edema B/L lower EXT. [  ] PVD stasis skin changes B/L Lower EXT  LYMPH: No lymphadenopathy noted  SKIN:  [x  ] No rashes or lesions, [  ] Pressure Ulcers, [  ] ecchymosis, [  ] Skin Tears, [  ] Other    DIET: Clear liquid    LABS:                        12.7   6.21  )-----------( 300      ( 20 Apr 2021 08:23 )             37.6     20 Apr 2021 08:23    140    |  107    |  4      ----------------------------<  241    3.3     |  25     |  0.69     Ca    9.0        20 Apr 2021 08:23    TPro  7.8    /  Alb  3.2    /  TBili  0.4    /  DBili  x      /  AST  191    /  ALT  278    /  AlkPhos  92     20 Apr 2021 08:23                             Anemia Panel:      Thyroid Panel:        Lipase, Serum: 151 U/L (04-16-21 @ 06:46)  Amylase, Serum Total: 22 U/L (04-16-21 @ 06:46)  Lipase, Serum: 274 U/L (04-14-21 @ 16:23)          RADIOLOGY & ADDITIONAL TESTS: none in past 24 hr      HEALTH ISSUES - PROBLEM Dx:  Acute pancreatitis    Diabetes mellitus  Insulin dependent    Transaminitis    Dyslipidemia    Autism spectrum disorder    Need for prophylactic measure            Consultant(s) Notes Reviewed:  [ x ] YES     Care Discussed with [ x ] Consultants, [ x ] Patient, [ x ] Family,- MOM -Jennifer [  ] HCP, [  ] , [  ] Social Service, [x ] RN, [  ] Physical Therapy, [  ] Palliative Care Team  DVT PPX: [ x ] Lovenox, [  ] SC Heparin, [  ] Coumadin, [  ] Xarelto, [  ] Eliquis, [  ] Pradaxa, [  ] IV Heparin drip, [  ] SCD, [  ] Ambulation, [  ] Contraindicated 2/2 GI Bleed, [  ] Contraindicated 2/2  Bleed, [  ] Contraindicated 2/2 Brain Bleed  Advanced Directive: [ x ] None, [  ] DNR/DNI Patient is a 21y old  Female who presents with a chief complaint of Acute pancreatitis (17 Apr 2021 10:12)      HPI:  [facility] is a 20 [facility] with PMH of autism, chronic pancreatitis, IDDM type 1 (Insulin pump present), and PCOS presents to the ED c/o epigastric pain x 2 days. Patient states that her sx are similar sx to her episodes of chronic pancreatitis. Patient has frequent flare-ups of her pancreatitis, last being a few weeks ago and did not require hospital stay. Patient was scheduled for total pancreatectomy w/ islet cell transplantation into liver this past January, but the procedure was cancelled due to COVID pandemic. Denies ETOH use, no change in medications. Patient states that her pain is well controlled s/p morphine 4mg IV in the ED. Patient notes mild nausea, which is improved s/p zofran. Denies any fevers, chills, CP, SOB, vomiting, diarrhea, constipation, or back pain. Patient received Moderna 2 of 2, last dose ~1 week ago. No known recent COVID exposures.    In the ED  VS- Temp 97.9F,  -->102, /76, RR 18, SpO2 97% on RA  Labs significant for lipase WNL, lactate 2.2, glu 222, AST 84,   CT a/p (+) acute pancreatitis, underlying chronic pancreatitis, hepatic steatosis  ECG showed sinus tachycardia, no signs of acute ischemia  s/p 1L NS, morphine 4mg IV x2, zofran 4mg IV x1 in the ED (14 Apr 2021 19:23)      INTERVAL HPI:  4/15/21: Patient admitted overnight. Patient seen and examined. Patient tired from late admission. States abdominal pain is improving, now a 4-4.5/10 in epigastric region with radiation to back. States pain regimen and heating pads have been helping her pain. Endorses intermittent nausea. No gallstones seen on imaging, patient denies EtOH use, and lipid panel only mildly elevated. NPO with IVF. Endocrine consulted - Dr C perlman ,for insulin pump.    4/16/21: No acute overnight events. Patient seen and examined. Patient tired, limited ROS given fatigue/pain. Says her abdominal pain and nausea are still present. Patient still NPO and on IVF. GI consulted, pt can be DC'd once she can tolerate PO diet. Endocrine consulted for insulin pump - pt wishes to hold off omnipod, started on Lantus 8U qam and continue Admelog scale q6hr while NPO., On IV Fluids.    4/17/21: Patient seen and examined. Patient tired, and sleeping during interview. States that abdominal pain and nausea are still present. Denies vomiting. On IVF. Pain controlled with current pain regimen. GI advanced diet to clear liquid for Diet. LFT's increasing.    4/18/21: No acute overnight events. Patient seen and examined. Patient tired, sleeping during interview. States that abdominal pain and nausea are still present. Says it worsens when she tries clear liquids. Per RN, patient barely drinking. On IVF. LFTs remain elevated.     4/19/21: No acute overnight events. Patient seen and examined. Patient is tired, sleeping during interview. States that abdominal pain and nausea are more intermittent now rather than constant but still present. Said she tried to have clear liquids yesterday when her mother was visiting but it increased her pain and nausea. On IVF. LFTs still elevated but now downtrending. K repleted.     4/20/21: No acute overnight events. Patient seen and examined. Patient more awake and responsive this AM. States that her abdominal pain is a 1-2/10 this morning, with intermittent episodes of sharp pain that resolve after a few seconds. She is requiring less pain medications. She had more jello and tea yesterday, says she gets some pain with food. Patient endorses having intermittent nausea that relieves with Zofran, states she takes Zofran at home. Blood sugars still elevated, per endo will increase Lantus to 12 U qam and will add admelog 2U tid. K repleted. Pt is ambulating.      OVERNIGHT EVENTS: NONE    Home Medications:  Allegra 180 mg oral tablet: 1 tab(s) orally once a day (14 Apr 2021 20:20)  Cranberry oral capsule: 8400 milligram(s) orally once a day (14 Apr 2021 20:20)  Creon 36,000 units oral delayed release capsule: 2 cap(s) orally 3 times a day (14 Apr 2021 20:20)  Creon 36,000 units oral delayed release capsule: 1 cap(s) orally once a day, As Needed with snacks (14 Apr 2021 20:20)  famotidine 40 mg oral tablet: 1 tab(s) orally once a day (at bedtime) (14 Apr 2021 20:20)  Flonase 50 mcg/inh nasal spray: 1 spray(s) nasal once a day (14 Apr 2021 20:20)  gabapentin 300 mg oral tablet: 1 tab(s) orally 2 times a day (14 Apr 2021 20:20)  Junel 1/20 oral tablet: 1 tab(s) orally once a day (14 Apr 2021 20:20)  Lantus 100 units/mL subcutaneous solution: Dose dependent on Freestlye Candy Sensor-14 (14 Apr 2021 20:20)  MiraLax oral powder for reconstitution: 17 gram(s) orally once a day (14 Apr 2021 20:20)  Multiple Vitamins oral tablet: 1 tab(s) orally once a day (14 Apr 2021 20:20)  NovoLOG 100 units/mL injectable solution: Sliding scale (14 Apr 2021 20:20)  ursodiol 300 mg oral capsule: 1 cap(s) orally once a day (14 Apr 2021 20:20)  Vitamin B12 1000 mcg oral tablet: 1 tab(s) orally once a day (14 Apr 2021 20:20)  Vitamin C 500 mg oral tablet: 1 tab(s) orally once a day (14 Apr 2021 20:20)  Zofran 4 mg oral tablet: 1 tab(s) orally every 6 hours, As Needed (14 Apr 2021 20:20)      MEDICATIONS  (STANDING):  ascorbic acid 500 milliGRAM(s) Oral daily  cyanocobalamin 1000 MICROGram(s) Oral daily  dextrose 40% Gel 15 Gram(s) Oral once  dextrose 5% + sodium chloride 0.9%. 1000 milliLiter(s) (100 mL/Hr) IV Continuous <Continuous>  dextrose 5%. 1000 milliLiter(s) (50 mL/Hr) IV Continuous <Continuous>  dextrose 5%. 1000 milliLiter(s) (100 mL/Hr) IV Continuous <Continuous>  dextrose 50% Injectable 25 Gram(s) IV Push once  dextrose 50% Injectable 12.5 Gram(s) IV Push once  dextrose 50% Injectable 25 Gram(s) IV Push once  enoxaparin Injectable 40 milliGRAM(s) SubCutaneous daily  famotidine    Tablet 40 milliGRAM(s) Oral two times a day  gabapentin 300 milliGRAM(s) Oral two times a day  glucagon  Injectable 1 milliGRAM(s) IntraMuscular once  insulin glargine Injectable (LANTUS) 12 Unit(s) SubCutaneous every morning  insulin lispro (ADMELOG) corrective regimen sliding scale   SubCutaneous Before meals and at bedtime  insulin lispro Injectable (ADMELOG) 2 Unit(s) SubCutaneous three times a day before meals  loratadine 10 milliGRAM(s) Oral daily  multivitamin 1 Tablet(s) Oral daily  pancrelipase  (CREON 36,000 Lipase Units) 2 Capsule(s) Oral three times a day with meals  polyethylene glycol 3350 17 Gram(s) Oral daily  potassium chloride   Powder 40 milliEquivalent(s) Oral every 4 hours  ursodiol Capsule 300 milliGRAM(s) Oral <User Schedule>    MEDICATIONS  (PRN):  fluticasone propionate 50 MICROgram(s)/spray Nasal Spray 1 Spray(s) Both Nostrils two times a day PRN nasal congestion  morphine  - Injectable 0.5 milliGRAM(s) IV Push every 3 hours PRN Mild Pain (1 - 3)  morphine  - Injectable 1 milliGRAM(s) IV Push every 4 hours PRN Moderate Pain (4 - 6)  morphine  - Injectable 2 milliGRAM(s) IV Push every 4 hours PRN Severe Pain (7 - 10)  ondansetron    Tablet 4 milliGRAM(s) Oral every 6 hours PRN Nausea and/or Vomiting  pancrelipase  (CREON 36,000 Lipase Units) 1 Capsule(s) Oral daily PRN w/ snacks      Allergies    No Known Allergies    Intolerances    REVIEW OF SYSTEMS: "Still having discomfort but feeling better"  CONSTITUTIONAL: No fever, No chills, No fatigue, No myalgia, No Body ache, No Weakness  EYES: No eye pain,  No visual disturbances, No discharge, NO Redness  ENMT:  No ear pain, No nose bleed, No vertigo; No sinus or throat pain, No Congestion  NECK: No pain, No stiffness  RESPIRATORY: No cough, wheezing, No  hemoptysis, No shortness of breath  CARDIOVASCULAR: No chest pain, palpitations  GASTROINTESTINAL: +Epigastric discomfort with radiation to the back with intermittent sharp pain. +Nausea that relieves with zofran. No vomiting. No diarrhea or constipation. [ 0 ] BM  GENITOURINARY: No dysuria, No frequency, No urgency, No hematuria, or incontinence  NEUROLOGICAL: No headaches, No dizziness, No numbness, No tingling, No tremors, No weakness  EXT: No Swelling, No Pain, No Edema  SKIN:  [ x ] No itching, burning, rashes, or lesions   MUSCULOSKELETAL: No joint pain or swelling; No muscle pain, +Back pain, No extremity pain  PSYCHIATRIC: No depression, anxiety, mood swings or difficulty sleeping at night  PAIN SCALE: [  ] None  [ x ] Other-1-2/10  ROS Unable to obtain due to - [  ] Dementia  [  ] Lethargy  [  ] Sedated [  ] non verbal  REST OF REVIEW Of SYSTEM - [ x ] Normal     Vital Signs Last 24 Hrs  T(C): 36.4 (20 Apr 2021 05:24), Max: 36.8 (19 Apr 2021 13:27)  T(F): 97.6 (20 Apr 2021 05:24), Max: 98.3 (19 Apr 2021 13:27)  HR: 80 (20 Apr 2021 05:24) (68 - 113)  BP: 108/62 (20 Apr 2021 05:24) (97/64 - 108/62)  BP(mean): --  RR: 17 (20 Apr 2021 05:24) (17 - 17)  SpO2: 93% (20 Apr 2021 05:24) (92% - 94%)  Finger Stick        04-19 @ 07:01  -  04-20 @ 07:00  --------------------------------------------------------  IN: 0 mL / OUT: 850 mL / NET: -850 mL        PHYSICAL EXAM:  GENERAL:  [x  ] NAD , [ x ] well appearing, [  ] Agitated, [  ] Drowsy,  [  ] Lethargy, [  ] confused   HEAD:  [ x ] Normal, [  ] Other  EYES:  [ x ] EOMI, [x  ] PERRLA, [x  ] conjunctiva and sclera clear normal, [  ] Other,  [  ] Pallor,[  ] Discharge  ENMT:  [x  ] Normal, [  x] dry mucous membranes, [  ] Good dentition, [ x ] No Thrush  NECK:  [ x ] Supple, [x  ] No JVD, [ x ] Normal thyroid, [  ] Lymphadenopathy [  ] Other  CHEST/LUNG:  [x  ] Clear to auscultation bilaterally, [ x ] Breath Sounds equal B/L ,  [x  ] No rales, [ x ] No rhonchi  [x  ]  No wheezing  HEART:  [x  ] Regular rate and rhythm, [  ] tachycardia, [  ] Bradycardia,  [  ] irregular  [ x ] No murmurs, No rubs, No gallops, [  ] PPM in place (Mfr:  )  ABDOMEN:  [ x ] Soft, [ x ] Mild tenderness in epigastric region NO R/R/G , [x  ] Nondistended, [ x ] No mass, [ x ] Bowel sounds present, [ x ] obese  NERVOUS SYSTEM:  [x  ] Alert & Oriented X3, [ x ] Nonfocal  [  ] Confusion  [  ] Encephalopathic [  ] Sedated [  ] Unable to assess, [  ] Other-  EXTREMITIES: [x  ] 2+ Peripheral Pulses, No clubbing, No cyanosis,  [  ] edema B/L lower EXT. [  ] PVD stasis skin changes B/L Lower EXT  LYMPH: No lymphadenopathy noted  SKIN:  [x  ] No rashes or lesions, [  ] Pressure Ulcers, [  ] ecchymosis, [  ] Skin Tears, [  ] Other    DIET: Clear liquid    LABS:                        12.7   6.21  )-----------( 300      ( 20 Apr 2021 08:23 )             37.6     20 Apr 2021 08:23    140    |  107    |  4      ----------------------------<  241    3.3     |  25     |  0.69     Ca    9.0        20 Apr 2021 08:23    TPro  7.8    /  Alb  3.2    /  TBili  0.4    /  DBili  x      /  AST  191    /  ALT  278    /  AlkPhos  92     20 Apr 2021 08:23      Thyroid Panel:      Lipase, Serum: 151 U/L (04-16-21 @ 06:46)  Amylase, Serum Total: 22 U/L (04-16-21 @ 06:46)  Lipase, Serum: 274 U/L (04-14-21 @ 16:23)      RADIOLOGY & ADDITIONAL TESTS: none in past 24 hr      HEALTH ISSUES - PROBLEM Dx:  Acute pancreatitis    Diabetes mellitus  Insulin dependent    Transaminitis    Dyslipidemia    Autism spectrum disorder    Need for prophylactic measure      Consultant(s) Notes Reviewed:  [ x ] YES     Care Discussed with [ x ] Consultants, [ x ] Patient, [ x ] Family,- MOM -Jennifer [  ] HCP, [  ] , [  ] Social Service, [x ] RN, [  ] Physical Therapy, [  ] Palliative Care Team  DVT PPX: [ x ] Lovenox, [  ] SC Heparin, [  ] Coumadin, [  ] Xarelto, [  ] Eliquis, [  ] Pradaxa, [  ] IV Heparin drip, [  ] SCD, [  ] Ambulation, [  ] Contraindicated 2/2 GI Bleed, [  ] Contraindicated 2/2  Bleed, [  ] Contraindicated 2/2 Brain Bleed  Advanced Directive: [ x ] None, [  ] DNR/DNI

## 2021-04-20 NOTE — PROGRESS NOTE ADULT - [id number]_GEN_ALL_CORE_FT
type 3c diabetes  pt wishes to hold off on resuming omnipod at this time  increase lantus 12 units qam  cont admelog scale coverage qac/qhs  add admelog 3 units 3x/day before meals  goal bg 100-180 in hosp setting

## 2021-04-20 NOTE — PROGRESS NOTE ADULT - NSICDXPROBLEMPLAN1_GEN_ALL_CORE_FT
Patient w/ hx of recurrent chronic pancreatitis p/w epigastric pain, found to have acute pancreatitis w/ underlying chronic pancreatitis   on CT scan. ?Etiology   -Does not meet SIRS criteria  -Denies ETOH use, no change in medications, lipid panel with only mild elevation, no gallstones on imaging   -Continue with D5/NS   -Pain control w/ Morphine, patient requiring less pain meds, will wean as tolerated  -Zofran PRN nausea  -On clear liquids per GI, advance diet as tolerated  -On Pancreatic enzyme supplement  -GI consulted, recs appreciated Patient w/ hx of recurrent chronic pancreatitis p/w epigastric pain, found to have acute pancreatitis w/ underlying chronic pancreatitis   on CT scan. ?Etiology   -Does not meet SIRS criteria  -Denies ETOH use, no change in medications, lipid panel with only mild elevation, no gallstones on imaging   -Continue with D5/NS   -Weaning pain meds, will switch pain control meds to Tylenol for mild pain, Tramadol 50 q6hr for mod pain, and morphine 1 mg q4hr for severe pain  -Zofran PRN nausea  -On clear liquids per GI, advance diet as tolerated  -On Pancreatic enzyme supplement  -GI consulted, recs appreciated

## 2021-04-20 NOTE — DISCHARGE NOTE PROVIDER - INSTRUCTIONS
Low fat, low cholesterol, low sugar diet  Low fat, low cholesterol, low sugar diet   Full liquid diet, advance as

## 2021-04-20 NOTE — DISCHARGE NOTE PROVIDER - NSDCMRMEDTOKEN_GEN_ALL_CORE_FT
Allegra 180 mg oral tablet: 1 tab(s) orally once a day  Cranberry oral capsule: 8400 milligram(s) orally once a day  Creon 36,000 units oral delayed release capsule: 2 cap(s) orally 3 times a day  Creon 36,000 units oral delayed release capsule: 1 cap(s) orally once a day, As Needed with snacks  famotidine 40 mg oral tablet: 1 tab(s) orally once a day (at bedtime)  Flonase 50 mcg/inh nasal spray: 1 spray(s) nasal once a day  gabapentin 300 mg oral tablet: 1 tab(s) orally 2 times a day  Junel 1/20 oral tablet: 1 tab(s) orally once a day  Lantus 100 units/mL subcutaneous solution: Dose dependent on Freestlye Candy Sensor-14  MiraLax oral powder for reconstitution: 17 gram(s) orally once a day  Multiple Vitamins oral tablet: 1 tab(s) orally once a day  NovoLOG 100 units/mL injectable solution: Sliding scale  ursodiol 300 mg oral capsule: 1 cap(s) orally once a day  Vitamin B12 1000 mcg oral tablet: 1 tab(s) orally once a day  Vitamin C 500 mg oral tablet: 1 tab(s) orally once a day  Zofran 4 mg oral tablet: 1 tab(s) orally every 6 hours, As Needed

## 2021-04-20 NOTE — DISCHARGE NOTE PROVIDER - PROVIDER TOKENS
PROVIDER:[TOKEN:[[id number]]],PROVIDER:[TOKEN:[[id number]]],PROVIDER:[TOKEN:[[id number]]]

## 2021-04-20 NOTE — CHART NOTE - NSCHARTNOTEFT_GEN_A_CORE
Assessment: Pt seen for nutrition follow-up for NPO/Clear liquids x 3 days. As per chart pt is a 21 year old female group home with PMH of autism, chronic pancreatitis, IDDM type 1 presents to the ED c/o epigastric pain x past several days admitted for acute pancreatitis.    Pt seen at bedside. Pt currently on Clear liquid diet, reports that she is tolerating it fairly however admits to having some GI pain when eating but resolved with medication. Pt continues with hyperglycemia, endocrine recommending to increase Lantus to 12 units and add Admelog 3 units 3x/day before meals. Denies any nausea/ vomiting/ diarrhea/ constipation, +BM today.     Factors impacting intake: [ ] none [ ] nausea  [ ] vomiting [ ] diarrhea [ ] constipation  [ ]chewing problems [ ] swallowing issues  [x ] other: altered GI function     Diet Presciption: Diet, Clear Liquid (04-17-21 @ 10:29)    Intake:     Current Weight: no updated weight in chart  Admission Weight: 153lbs   % Weight Change- pt appears thinner then this, recommend to obtain pt's updated body weight     Pertinent Medications: MEDICATIONS  (STANDING):  ascorbic acid 500 milliGRAM(s) Oral daily  cyanocobalamin 1000 MICROGram(s) Oral daily  dextrose 40% Gel 15 Gram(s) Oral once  dextrose 5% + sodium chloride 0.9%. 1000 milliLiter(s) (100 mL/Hr) IV Continuous <Continuous>  dextrose 5%. 1000 milliLiter(s) (50 mL/Hr) IV Continuous <Continuous>  dextrose 5%. 1000 milliLiter(s) (100 mL/Hr) IV Continuous <Continuous>  dextrose 50% Injectable 25 Gram(s) IV Push once  dextrose 50% Injectable 12.5 Gram(s) IV Push once  dextrose 50% Injectable 25 Gram(s) IV Push once  enoxaparin Injectable 40 milliGRAM(s) SubCutaneous daily  famotidine    Tablet 40 milliGRAM(s) Oral two times a day  gabapentin 300 milliGRAM(s) Oral two times a day  glucagon  Injectable 1 milliGRAM(s) IntraMuscular once  insulin glargine Injectable (LANTUS) 12 Unit(s) SubCutaneous every morning  insulin lispro (ADMELOG) corrective regimen sliding scale   SubCutaneous Before meals and at bedtime  insulin lispro Injectable (ADMELOG) 2 Unit(s) SubCutaneous three times a day before meals  loratadine 10 milliGRAM(s) Oral daily  multivitamin 1 Tablet(s) Oral daily  pancrelipase  (CREON 36,000 Lipase Units) 2 Capsule(s) Oral three times a day with meals  polyethylene glycol 3350 17 Gram(s) Oral daily  ursodiol Capsule 300 milliGRAM(s) Oral <User Schedule>    MEDICATIONS  (PRN):  acetaminophen   Tablet .. 650 milliGRAM(s) Oral every 6 hours PRN Mild Pain (1 - 3)  fluticasone propionate 50 MICROgram(s)/spray Nasal Spray 1 Spray(s) Both Nostrils two times a day PRN nasal congestion  morphine  - Injectable 1 milliGRAM(s) IV Push every 4 hours PRN Severe Pain (7 - 10)  ondansetron    Tablet 4 milliGRAM(s) Oral every 6 hours PRN Nausea and/or Vomiting  pancrelipase  (CREON 36,000 Lipase Units) 1 Capsule(s) Oral daily PRN w/ snacks  traMADol 50 milliGRAM(s) Oral every 6 hours PRN Moderate Pain (4 - 6)    Pertinent Labs: 04-20 Na140 mmol/L Glu 241 mg/dL<H> K+ 3.3 mmol/L<L> Cr  0.69 mg/dL BUN 4 mg/dL<L> 04-20 Alb 3.2 g/dL<L>, 04-15 Chol 205 mg/dL<H> LDL --    HDL 35 mg/dL<L> Trig 330 mg/dL<H>     CAPILLARY BLOOD GLUCOSE    POCT Blood Glucose.: 295 mg/dL (20 Apr 2021 11:01)  POCT Blood Glucose.: 239 mg/dL (20 Apr 2021 08:30)  POCT Blood Glucose.: 230 mg/dL (20 Apr 2021 06:15)  POCT Blood Glucose.: 251 mg/dL (19 Apr 2021 23:51)  POCT Blood Glucose.: 236 mg/dL (19 Apr 2021 16:42)    Skin: No edema or pressure injuries noted at this time     Estimated Needs:   [x ] no change since previous assessment  [ ] recalculated:     Previous Nutrition Diagnosis:    [x ] Altered GI Function     Nutrition Diagnosis is [ x] ongoing     New Nutrition Diagnosis: [x ] not applicable       Interventions:   Recommend  [ x] Change Diet To: When medically feasible recommend to advance diet to Consistent CHO, Low fat   [ ] Nutrition Supplement  [ ] Nutrition Support  [ x] Other:   1) Pt encouraged to consume Ensure Clear while on clear liquid diet to provide additional source of protein and nutrients   2) Monitor pt's PO intake, weight, skin, edema, GI distress     Monitoring and Evaluation:   [ x] PO intake [ x ] Tolerance to diet prescription [ x ] weights [ x ] labs[ x ] follow up per protocol  [ ]x other: RD to remain available
Spoke with Gracia RN [phone] at group home patient resides in. Requesting to be get all medical updates daily for patient as she gets all her medical care at the group home. Updated Gracia and will add number to patient's chart.

## 2021-04-20 NOTE — PROGRESS NOTE ADULT - ASSESSMENT
Patient is a 22 yo female from [facility] with PMH of autism, chronic pancreatitis, IDDM type 1 presents to the ED c/o epigastric pain x past several days admitted for acute pancreatitis.

## 2021-04-20 NOTE — DISCHARGE NOTE PROVIDER - HOSPITAL COURSE
FROM ADMISSION H+P:   HPI:  Patient is a 22 yo female from [facility] with PMH of autism, chronic pancreatitis, IDDM type 1 (Insulin pump present), and PCOS presents to the ED c/o epigastric pain x 2 days. Patient states that her sx are similar sx to her episodes of chronic pancreatitis. Patient has frequent flare-ups of her pancreatitis, last being a few weeks ago and did not require hospital stay. Patient was scheduled for total pancreatectomy w/ islet cell transplantation into liver this past January, but the procedure was cancelled due to COVID pandemic. Denies ETOH use, no change in medications. Patient states that her pain is well controlled s/p morphine 4mg IV in the ED. Patient notes mild nausea, which is improved s/p zofran. Denies any fevers, chills, CP, SOB, vomiting, diarrhea, constipation, or back pain. Patient received Moderna 2 of 2, last dose ~1 week ago. No known recent COVID exposures.    In the ED  VS- Temp 97.9F,  -->102, /76, RR 18, SpO2 97% on RA  Labs significant for lipase WNL, lactate 2.2, glu 222, AST 84,   CT a/p (+) acute pancreatitis, underlying chronic pancreatitis, hepatic steatosis  ECG showed sinus tachycardia, no signs of acute ischemia  s/p 1L NS, morphine 4mg IV x2, zofran 4mg IV x1 in the ED (14 Apr 2021 19:23)      ---  HOSPITAL COURSE:         PT consulted and recommended _____.     Patient showed improvement throughout hospitalization. Patient was seen and examined on day of discharge. Patient was medically optimized for discharge _____, with close outpatient follow up.  ---  CONSULTANTS:     ---  TIME SPENT:  I, the attending physician, was physically present for the key portions of the evaluation and management (E/M) service provided. The total amount of time spent reviewing the hospital notes, laboratory values, imaging findings, assessing/counseling the patient, discussing with consultant physicians, social work, nursing staff was -- minutes    ---  Primary care provider was made aware of plan for discharge:      [  ] NO     [  ] YES   FROM ADMISSION H+P:   HPI:  Patient is a 22 yo female from [facility] with PMH of autism, chronic pancreatitis, IDDM type 1 (Insulin pump present), and PCOS presents to the ED c/o epigastric pain x 2 days. Patient states that her sx are similar sx to her episodes of chronic pancreatitis. Patient has frequent flare-ups of her pancreatitis, last being a few weeks ago and did not require hospital stay. Patient was scheduled for total pancreatectomy w/ islet cell transplantation into liver this past January, but the procedure was cancelled due to COVID pandemic. Denies ETOH use, no change in medications. Patient states that her pain is well controlled s/p morphine 4mg IV in the ED. Patient notes mild nausea, which is improved s/p zofran. Denies any fevers, chills, CP, SOB, vomiting, diarrhea, constipation, or back pain. Patient received Moderna 2 of 2, last dose ~1 week ago. No known recent COVID exposures.    In the ED  VS- Temp 97.9F,  -->102, /76, RR 18, SpO2 97% on RA  Labs significant for lipase WNL, lactate 2.2, glu 222, AST 84,   CT a/p (+) acute pancreatitis, underlying chronic pancreatitis, hepatic steatosis  ECG showed sinus tachycardia, no signs of acute ischemia  s/p 1L NS, morphine 4mg IV x2, zofran 4mg IV x1 in the ED (14 Apr 2021 19:23)      ---  HOSPITAL COURSE: Patient was admitted for acute on chronic pancreatitis. Etiology was unclear - CT scan/US abdomen showed no gallstones, patient denied EtOH use, triglycerides were only slightly elevated, and patient had no new medication changes. She was made NPO and started on IVF. Pain was controlled with morphine prn, weaned as tolerated, and nausea was controlled with zofran prn. Diet was advanced as tolerated. Endocrine was consulted for patient's Type I diabetes as patient is on insulin pump at home, patient opted to hold off omnipod, blood sugar was controlled with Lantus, premeal admelog, and admelog sliding scale. Patient had transaminitis on labs, RUQ negative for gallstones but showed hepatic steatosis. Her LFTs downtrended during admission.     PT consulted and recommended _____.     Patient showed improvement throughout hospitalization. Patient was seen and examined on day of discharge. Patient was medically optimized for discharge _____, with close outpatient follow up.  ---  CONSULTANTS:   GI - Dr. Ladd  Endocrine - Dr. Perlman  ---  TIME SPENT:  I, the attending physician, was physically present for the key portions of the evaluation and management (E/M) service provided. The total amount of time spent reviewing the hospital notes, laboratory values, imaging findings, assessing/counseling the patient, discussing with consultant physicians, social work, nursing staff was -- minutes    ---  Primary care provider was made aware of plan for discharge:      [  ] NO     [  ] YES   FROM ADMISSION H+P:   HPI:  Patient is a 22 yo female from [facility] with PMH of autism, chronic pancreatitis, IDDM type 1 (Insulin pump present), and PCOS presents to the ED c/o epigastric pain x 2 days. Patient states that her sx are similar sx to her episodes of chronic pancreatitis. Patient has frequent flare-ups of her pancreatitis, last being a few weeks ago and did not require hospital stay. Patient was scheduled for total pancreatectomy w/ islet cell transplantation into liver this past January, but the procedure was cancelled due to COVID pandemic. Denies ETOH use, no change in medications. Patient states that her pain is well controlled s/p morphine 4mg IV in the ED. Patient notes mild nausea, which is improved s/p zofran. Denies any fevers, chills, CP, SOB, vomiting, diarrhea, constipation, or back pain. Patient received Moderna 2 of 2, last dose ~1 week ago. No known recent COVID exposures.    In the ED  VS- Temp 97.9F,  -->102, /76, RR 18, SpO2 97% on RA  Labs significant for lipase WNL, lactate 2.2, glu 222, AST 84,   CT a/p (+) acute pancreatitis, underlying chronic pancreatitis, hepatic steatosis  ECG showed sinus tachycardia, no signs of acute ischemia  s/p 1L NS, morphine 4mg IV x2, zofran 4mg IV x1 in the ED (14 Apr 2021 19:23)      ---  HOSPITAL COURSE: Patient was admitted for acute on chronic pancreatitis. Etiology was unclear - CT scan/US abdomen showed no gallstones, patient denied EtOH use, triglycerides were only slightly elevated, and patient had no new medication changes. She was made NPO and started on IVF. Pain was controlled with morphine prn, weaned as tolerated, and nausea was controlled with zofran prn. Diet was advanced as tolerated. Endocrine was consulted for patient's Type I diabetes as patient is on insulin pump at home, patient opted to hold off omnipod, blood sugar was controlled with Lantus, premeal admelog, and admelog sliding scale. Patient had transaminitis on labs, RUQ negative for gallstones but showed hepatic steatosis. Her LFTs downtrended during admission. Patient showed improvement throughout hospitalization. Patient was seen and examined on day of discharge. Patient was medically optimized for discharge home, with close outpatient follow up.  ---  CONSULTANTS:   GI - Dr. Ladd  Endocrine - Dr. Perlman  ---  TIME SPENT:  I, the attending physician, was physically present for the key portions of the evaluation and management (E/M) service provided. The total amount of time spent reviewing the hospital notes, laboratory values, imaging findings, assessing/counseling the patient, discussing with consultant physicians, social work, nursing staff was -- minutes    ---  Primary care provider was made aware of plan for discharge:      [  ] NO     [  ] YES   FROM ADMISSION H+P:   HPI:  Patient is a 20 yo female from [facility] with PMH of autism, chronic pancreatitis, IDDM type 1 (Insulin pump present), and PCOS presents to the ED c/o epigastric pain x 2 days. Patient states that her sx are similar sx to her episodes of chronic pancreatitis. Patient has frequent flare-ups of her pancreatitis, last being a few weeks ago and did not require hospital stay. Patient was scheduled for total pancreatectomy w/ islet cell transplantation into liver this past January, but the procedure was cancelled due to COVID pandemic. Denies ETOH use, no change in medications. Patient states that her pain is well controlled s/p morphine 4mg IV in the ED. Patient notes mild nausea, which is improved s/p zofran. Denies any fevers, chills, CP, SOB, vomiting, diarrhea, constipation, or back pain. Patient received Moderna 2 of 2, last dose ~1 week ago. No known recent COVID exposures.    In the ED  VS- Temp 97.9F,  -->102, /76, RR 18, SpO2 97% on RA  Labs significant for lipase WNL, lactate 2.2, glu 222, AST 84,   CT a/p (+) acute pancreatitis, underlying chronic pancreatitis, hepatic steatosis  ECG showed sinus tachycardia, no signs of acute ischemia  s/p 1L NS, morphine 4mg IV x2, zofran 4mg IV x1 in the ED (14 Apr 2021 19:23)      ---  HOSPITAL COURSE: Patient was admitted for acute on chronic pancreatitis. Etiology was unclear - CT scan/US abdomen showed no gallstones, patient denied EtOH use, triglycerides were only slightly elevated, and patient had no new medication changes. She was made NPO and started on IVF. Pain was controlled with morphine prn, weaned as tolerated, and nausea was controlled with zofran prn. Diet was advanced as tolerated. Endocrine was consulted for patient's Type I diabetes as patient is on insulin pump at home, patient opted to hold off omnipod, blood sugar was controlled with Lantus, premeal admelog, and admelog sliding scale. Patient had transaminitis on labs, RUQ negative for gallstones but showed hepatic steatosis. Her LFTs downtrended during admission. Patient showed improvement throughout hospitalization. Patient was seen and examined on day of discharge. Patient was medically optimized for discharge home, with close outpatient follow up.  ---  CONSULTANTS:   GI - Dr. Ladd  Endocrine - Dr. Perlman  ---  TIME SPENT:  I, the attending physician, was physically present for the key portions of the evaluation and management (E/M) service provided. The total amount of time spent reviewing the hospital notes, laboratory values, imaging findings, assessing/counseling the patient, discussing with consultant physicians, social work, nursing staff was 50 minutes

## 2021-04-20 NOTE — DISCHARGE NOTE PROVIDER - NSDCFUADDINST_GEN_ALL_CORE_FT
-Follow up with Your Endocrinologist in 1 week   - Follow up with your Pain management in 1week to discuss about pain meds adjustment if your abdominal pain still persist.  -Follow up with your GI -for chronic pancreatitis.

## 2021-04-20 NOTE — PROGRESS NOTE ADULT - NSICDXPROBLEMPLAN2_GEN_ALL_CORE_FT
Hx of IDDM1, insulin pump present  -Dr. Perlman consulted: pt wishes to hold off omnipod. Will increase Lantus to 12U qam and add admelog 2U TID. Cont admelog scale q6hr as pt barely eating clear liquid diet.   -Continue D5/NS   -Hypoglycemia protocol

## 2021-04-20 NOTE — PROGRESS NOTE ADULT - SUBJECTIVE AND OBJECTIVE BOX
INTERVAL HPI/OVERNIGHT EVENTS:  Pt seen and examined   tolerating clears well      MEDICATIONS  (STANDING):  ascorbic acid 500 milliGRAM(s) Oral daily  cyanocobalamin 1000 MICROGram(s) Oral daily  dextrose 40% Gel 15 Gram(s) Oral once  dextrose 5% + sodium chloride 0.9%. 1000 milliLiter(s) (125 mL/Hr) IV Continuous <Continuous>  dextrose 5%. 1000 milliLiter(s) (50 mL/Hr) IV Continuous <Continuous>  dextrose 5%. 1000 milliLiter(s) (100 mL/Hr) IV Continuous <Continuous>  dextrose 50% Injectable 25 Gram(s) IV Push once  dextrose 50% Injectable 12.5 Gram(s) IV Push once  dextrose 50% Injectable 25 Gram(s) IV Push once  enoxaparin Injectable 40 milliGRAM(s) SubCutaneous daily  famotidine    Tablet 40 milliGRAM(s) Oral two times a day  gabapentin 300 milliGRAM(s) Oral two times a day  glucagon  Injectable 1 milliGRAM(s) IntraMuscular once  insulin glargine Injectable (LANTUS) 10 Unit(s) SubCutaneous every morning  insulin lispro (ADMELOG) corrective regimen sliding scale   SubCutaneous every 6 hours  loratadine 10 milliGRAM(s) Oral daily  multivitamin 1 Tablet(s) Oral daily  pancrelipase  (CREON 36,000 Lipase Units) 2 Capsule(s) Oral three times a day with meals  polyethylene glycol 3350 17 Gram(s) Oral daily  ursodiol Capsule 300 milliGRAM(s) Oral <User Schedule>    MEDICATIONS  (PRN):  fluticasone propionate 50 MICROgram(s)/spray Nasal Spray 1 Spray(s) Both Nostrils two times a day PRN nasal congestion  morphine  - Injectable 2 milliGRAM(s) IV Push every 4 hours PRN Moderate Pain (4 - 6)  morphine  - Injectable 3 milliGRAM(s) IV Push every 4 hours PRN Severe Pain (7 - 10)  morphine  - Injectable 1 milliGRAM(s) IV Push every 3 hours PRN Mild Pain (1 - 3)  ondansetron    Tablet 4 milliGRAM(s) Oral every 6 hours PRN Nausea and/or Vomiting  pancrelipase  (CREON 36,000 Lipase Units) 1 Capsule(s) Oral daily PRN w/ snacks      Allergies    No Known Allergies    Intolerances            ROS:   General:  No wt loss, fevers, chills, night sweats, fatigue,   Eyes:  Good vision, no reported pain  ENT:  No sore throat, pain, runny nose, dysphagia  CV:  No pain, palpitations, hypo/hypertension  Resp:  No dyspnea, cough, tachypnea, wheezing  GI:  No pain, No nausea, No vomiting, No diarrhea, No constipation, No weight loss, No fever, No pruritis, No rectal bleeding, No tarry stools, No dysphagia,  :  No pain, bleeding, incontinence, nocturia  Muscle:  No pain, weakness  Neuro:  No weakness, tingling, memory problems  Psych:  No fatigue, insomnia, mood problems, depression  Endocrine:  No polyuria, polydipsia, cold/heat intolerance  Heme:  No petechiae, ecchymosis, easy bruisability  Skin:  No rash, tattoos, scars, edema    Vital Signs Last 24 Hrs  T(C): 36.7 (18 Apr 2021 05:00), Max: 37 (17 Apr 2021 14:26)  T(F): 98.1 (18 Apr 2021 05:00), Max: 98.6 (17 Apr 2021 14:26)  HR: 86 (18 Apr 2021 05:00) (76 - 99)  BP: 104/63 (18 Apr 2021 05:00) (98/61 - 114/72)  BP(mean): --  RR: 18 (18 Apr 2021 05:00) (17 - 18)  SpO2: 95% (18 Apr 2021 05:00) (95% - 98%)      GENERAL:  Appears stated age, well-groomed, well-nourished, no distress  HEENT:  NC/AT,  conjunctivae clear and pink, no thyromegaly, nodules, adenopathy, no JVD, sclera -anicteric  CHEST:  Full & symmetric excursion, no increased effort, breath sounds clear  HEART:  Regular rhythm, S1, S2, no murmur/rub/S3/S4, no abdominal bruit, no edema  ABDOMEN:  Soft, non-tender, non-distended, normoactive bowel sounds,  no masses ,no hepato-splenomegaly, no signs of chronic liver disease  EXTEREMITIES:  no cyanosis,clubbing or edema  SKIN:  No rash/erythema/ecchymoses/petechiae/wounds/abscess/warm/dry  NEURO:  Alert, oriented, no asterixis, no tremor, no encephalopathy          LABS:                        12.0   4.07  )-----------( 255      ( 18 Apr 2021 09:15 )             36.2     04-18    140  |  108  |  5<L>  ----------------------------<  243<H>  3.6   |  24  |  0.68    Ca    8.7      18 Apr 2021 09:15    TPro  7.2  /  Alb  2.8<L>  /  TBili  0.4  /  DBili  .20  /  AST  323<H>  /  ALT  286<H>  /  AlkPhos  83  04-18 04-17-21 @ 07:01  -  04-18-21 @ 07:00  --------------------------------------------------------  IN: 3000 mL / OUT: 450 mL / NET: 2550 mL

## 2021-04-21 LAB
ALBUMIN SERPL ELPH-MCNC: 3.1 G/DL — LOW (ref 3.3–5)
ALP SERPL-CCNC: 88 U/L — SIGNIFICANT CHANGE UP (ref 40–120)
ALT FLD-CCNC: 242 U/L — HIGH (ref 12–78)
ANION GAP SERPL CALC-SCNC: 8 MMOL/L — SIGNIFICANT CHANGE UP (ref 5–17)
AST SERPL-CCNC: 158 U/L — HIGH (ref 15–37)
BILIRUB SERPL-MCNC: 0.3 MG/DL — SIGNIFICANT CHANGE UP (ref 0.2–1.2)
BUN SERPL-MCNC: 4 MG/DL — LOW (ref 7–23)
CALCIUM SERPL-MCNC: 8.7 MG/DL — SIGNIFICANT CHANGE UP (ref 8.5–10.1)
CHLORIDE SERPL-SCNC: 109 MMOL/L — HIGH (ref 96–108)
CO2 SERPL-SCNC: 25 MMOL/L — SIGNIFICANT CHANGE UP (ref 22–31)
CREAT SERPL-MCNC: 0.66 MG/DL — SIGNIFICANT CHANGE UP (ref 0.5–1.3)
GLUCOSE SERPL-MCNC: 198 MG/DL — HIGH (ref 70–99)
HCT VFR BLD CALC: 36.7 % — SIGNIFICANT CHANGE UP (ref 34.5–45)
HGB BLD-MCNC: 11.9 G/DL — SIGNIFICANT CHANGE UP (ref 11.5–15.5)
MCHC RBC-ENTMCNC: 29.2 PG — SIGNIFICANT CHANGE UP (ref 27–34)
MCHC RBC-ENTMCNC: 32.4 GM/DL — SIGNIFICANT CHANGE UP (ref 32–36)
MCV RBC AUTO: 90 FL — SIGNIFICANT CHANGE UP (ref 80–100)
NRBC # BLD: 0 /100 WBCS — SIGNIFICANT CHANGE UP (ref 0–0)
PLATELET # BLD AUTO: 264 K/UL — SIGNIFICANT CHANGE UP (ref 150–400)
POTASSIUM SERPL-MCNC: 3.1 MMOL/L — LOW (ref 3.5–5.3)
POTASSIUM SERPL-SCNC: 3.1 MMOL/L — LOW (ref 3.5–5.3)
PROT SERPL-MCNC: 7.4 G/DL — SIGNIFICANT CHANGE UP (ref 6–8.3)
RBC # BLD: 4.08 M/UL — SIGNIFICANT CHANGE UP (ref 3.8–5.2)
RBC # FLD: 12.6 % — SIGNIFICANT CHANGE UP (ref 10.3–14.5)
SODIUM SERPL-SCNC: 142 MMOL/L — SIGNIFICANT CHANGE UP (ref 135–145)
WBC # BLD: 5.38 K/UL — SIGNIFICANT CHANGE UP (ref 3.8–10.5)
WBC # FLD AUTO: 5.38 K/UL — SIGNIFICANT CHANGE UP (ref 3.8–10.5)

## 2021-04-21 RX ORDER — INSULIN LISPRO 100/ML
2 VIAL (ML) SUBCUTANEOUS
Refills: 0 | Status: DISCONTINUED | OUTPATIENT
Start: 2021-04-21 | End: 2021-04-22

## 2021-04-21 RX ORDER — INSULIN LISPRO 100/ML
3 VIAL (ML) SUBCUTANEOUS
Refills: 0 | Status: DISCONTINUED | OUTPATIENT
Start: 2021-04-21 | End: 2021-04-21

## 2021-04-21 RX ORDER — INSULIN GLARGINE 100 [IU]/ML
12 INJECTION, SOLUTION SUBCUTANEOUS EVERY MORNING
Refills: 0 | Status: DISCONTINUED | OUTPATIENT
Start: 2021-04-21 | End: 2021-04-22

## 2021-04-21 RX ORDER — POTASSIUM CHLORIDE 20 MEQ
40 PACKET (EA) ORAL EVERY 4 HOURS
Refills: 0 | Status: COMPLETED | OUTPATIENT
Start: 2021-04-21 | End: 2021-04-21

## 2021-04-21 RX ADMIN — Medication 2 CAPSULE(S): at 17:08

## 2021-04-21 RX ADMIN — GABAPENTIN 300 MILLIGRAM(S): 400 CAPSULE ORAL at 17:09

## 2021-04-21 RX ADMIN — ONDANSETRON 4 MILLIGRAM(S): 8 TABLET, FILM COATED ORAL at 09:43

## 2021-04-21 RX ADMIN — Medication 2 UNIT(S): at 11:16

## 2021-04-21 RX ADMIN — Medication 2 CAPSULE(S): at 11:27

## 2021-04-21 RX ADMIN — Medication 2 CAPSULE(S): at 09:42

## 2021-04-21 RX ADMIN — Medication 3: at 21:36

## 2021-04-21 RX ADMIN — Medication 40 MILLIEQUIVALENT(S): at 13:34

## 2021-04-21 RX ADMIN — Medication 3: at 17:09

## 2021-04-21 RX ADMIN — Medication 4: at 11:17

## 2021-04-21 RX ADMIN — Medication 40 MILLIEQUIVALENT(S): at 11:22

## 2021-04-21 RX ADMIN — URSODIOL 300 MILLIGRAM(S): 250 TABLET, FILM COATED ORAL at 05:49

## 2021-04-21 RX ADMIN — Medication 1 CAPSULE(S): at 11:22

## 2021-04-21 RX ADMIN — Medication 2 UNIT(S): at 07:31

## 2021-04-21 RX ADMIN — Medication 2: at 07:32

## 2021-04-21 RX ADMIN — TRAMADOL HYDROCHLORIDE 50 MILLIGRAM(S): 50 TABLET ORAL at 10:15

## 2021-04-21 RX ADMIN — GABAPENTIN 300 MILLIGRAM(S): 400 CAPSULE ORAL at 05:49

## 2021-04-21 RX ADMIN — FAMOTIDINE 40 MILLIGRAM(S): 10 INJECTION INTRAVENOUS at 17:09

## 2021-04-21 RX ADMIN — TRAMADOL HYDROCHLORIDE 50 MILLIGRAM(S): 50 TABLET ORAL at 09:45

## 2021-04-21 RX ADMIN — Medication 1 TABLET(S): at 11:22

## 2021-04-21 RX ADMIN — Medication 3 UNIT(S): at 17:09

## 2021-04-21 RX ADMIN — Medication 500 MILLIGRAM(S): at 11:23

## 2021-04-21 RX ADMIN — INSULIN GLARGINE 12 UNIT(S): 100 INJECTION, SOLUTION SUBCUTANEOUS at 07:54

## 2021-04-21 RX ADMIN — LORATADINE 10 MILLIGRAM(S): 10 TABLET ORAL at 11:22

## 2021-04-21 RX ADMIN — ENOXAPARIN SODIUM 40 MILLIGRAM(S): 100 INJECTION SUBCUTANEOUS at 11:23

## 2021-04-21 RX ADMIN — POLYETHYLENE GLYCOL 3350 17 GRAM(S): 17 POWDER, FOR SOLUTION ORAL at 11:22

## 2021-04-21 RX ADMIN — PREGABALIN 1000 MICROGRAM(S): 225 CAPSULE ORAL at 11:22

## 2021-04-21 RX ADMIN — FAMOTIDINE 40 MILLIGRAM(S): 10 INJECTION INTRAVENOUS at 05:49

## 2021-04-21 NOTE — PROGRESS NOTE ADULT - NSICDXPROBLEMPLAN3_GEN_ALL_CORE_FT
- LFTs elevated but now downtrending   - RUQ US: hepatic steatosis, no gallstones  - Will trend  - GI consulted- Dr Carrasco follow up - LFTs elevated but now downtrending   - RUQ US: hepatic steatosis, no gallstones  - Will trend  - GI consulted- Dr Ladd

## 2021-04-21 NOTE — CONSULT NOTE ADULT - NSICDXPROBLEMREC1_GEN_ALL_CORE_FT
Type 3c A1c % s/p  Recommend endocrine  FU appt  diabetes education provided  Diabetes support group info and cell # [phone] given   Goal 100-180 mg/dL; 140-180 mg/dL in critical care areas Type 3c A1c 8.4%   Recommend endocrine_Perlman FU appt: Dr Hernandez in [place]  Goal 100-180 mg/dL; 140-180 mg/dL in critical care areas

## 2021-04-21 NOTE — PROGRESS NOTE ADULT - ASSESSMENT
acute on chronic pancreatitis       clears as nusrat  low fat diet in am  d/w patient  dc planning once tolerating po  patient with recent eus at [facility]  being eval for pancreatectomy and islet cell transplant at [place]    Advanced care planning was discussed with patient and family.  Advanced care planning forms were reviewed and discussed.  Risks, benefits and alternatives of gastroenterologic procedures were discussed in detail and all questions were answered.    30 minutes spent. acute on chronic pancreatitis       clears as nusrat  low fat diet   d/w patient  dc planning once tolerating po  patient with recent eus at [facility]  being eval for pancreatectomy and islet cell transplant at [place]    Advanced care planning was discussed with patient and family.  Advanced care planning forms were reviewed and discussed.  Risks, benefits and alternatives of gastroenterologic procedures were discussed in detail and all questions were answered.    30 minutes spent. acute on chronic pancreatitis       clears as nusrat  low fat diet   d/w patient  dc planning once tolerating po  patient with recent eus at [facility]  being eval for pancreatectomy and islet cell transplant at [place]    Advanced care planning was discussed with patient and family.  Advanced care planning forms were reviewed and discussed.  Risks, benefits and alternatives of gastroenterologic procedures were discussed in detail and all questions were answered.    30 minutes spent.

## 2021-04-21 NOTE — CONSULT NOTE ADULT - SUBJECTIVE AND OBJECTIVE BOX
Patient is a 21y old  Female who presents with a chief complaint of Acute pancreatitis (14 Apr 2021 19:23)      Reason For Consult: dm uncontrolled    HPI:  [facility] is a 20 [facility] with PMH of autism, chronic pancreatitis, IDDM type 1 (Insulin pump present), and PCOS presents to the ED c/o epigastric pain x 2 days. Patient states that her sx are similar sx to her episodes of chronic pancreatitis. Patient has frequent flare-ups of her pancreatitis, last being a few weeks ago and did not require hospital stay. Patient was scheduled for total pancreatectomy w/ islet cell transplantation into liver this past January, but the procedure was cancelled due to COVID pandemic. Denies ETOH use, no change in medications. Patient states that her pain is well controlled s/p morphine 4mg IV in the ED. Patient notes mild nausea, which is improved s/p zofran. Denies any fevers, chills, CP, SOB, vomiting, diarrhea, constipation, or back pain. Patient received Moderna 2 of 2, last dose ~1 week ago. No known recent COVID exposures.    In the ED  VS- Temp 97.9F,  -->102, /76, RR 18, SpO2 97% on RA  Labs significant for lipase WNL, lactate 2.2, glu 222, AST 84,   CT a/p (+) acute pancreatitis, underlying chronic pancreatitis, hepatic steatosis  ECG showed sinus tachycardia, no signs of acute ischemia  s/p 1L NS, morphine 4mg IV x2, zofran 4mg IV x1 in the ED (14 Apr 2021 19:23)      PAST MEDICAL & SURGICAL HISTORY:  Autism spectrum disorder    Chronic pancreatitis    Diabetes mellitus  Insulin dependent        FAMILY HISTORY:        Social History:    MEDICATIONS  (STANDING):  ascorbic acid 500 milliGRAM(s) Oral daily  cyanocobalamin 1000 MICROGram(s) Oral daily  dextrose 40% Gel 15 Gram(s) Oral once  dextrose 5% + sodium chloride 0.9%. 1000 milliLiter(s) (125 mL/Hr) IV Continuous <Continuous>  dextrose 5%. 1000 milliLiter(s) (50 mL/Hr) IV Continuous <Continuous>  dextrose 5%. 1000 milliLiter(s) (100 mL/Hr) IV Continuous <Continuous>  dextrose 50% Injectable 25 Gram(s) IV Push once  dextrose 50% Injectable 12.5 Gram(s) IV Push once  dextrose 50% Injectable 25 Gram(s) IV Push once  enoxaparin Injectable 40 milliGRAM(s) SubCutaneous daily  famotidine    Tablet 40 milliGRAM(s) Oral two times a day  gabapentin 300 milliGRAM(s) Oral two times a day  glucagon  Injectable 1 milliGRAM(s) IntraMuscular once  insulin lispro (ADMELOG) corrective regimen sliding scale   SubCutaneous three times a day before meals  insulin lispro (ADMELOG) corrective regimen sliding scale   SubCutaneous at bedtime  loratadine 10 milliGRAM(s) Oral daily  multivitamin 1 Tablet(s) Oral daily  pancrelipase  (CREON 36,000 Lipase Units) 2 Capsule(s) Oral three times a day with meals  polyethylene glycol 3350 17 Gram(s) Oral daily  ursodiol Capsule 300 milliGRAM(s) Oral <User Schedule>    MEDICATIONS  (PRN):  fluticasone propionate 50 MICROgram(s)/spray Nasal Spray 1 Spray(s) Both Nostrils two times a day PRN nasal congestion  morphine  - Injectable 2 milliGRAM(s) IV Push every 4 hours PRN Moderate Pain (4 - 6)  morphine  - Injectable 3 milliGRAM(s) IV Push every 4 hours PRN Severe Pain (7 - 10)  morphine  - Injectable 1 milliGRAM(s) IV Push every 3 hours PRN Mild Pain (1 - 3)  ondansetron    Tablet 4 milliGRAM(s) Oral every 6 hours PRN Nausea and/or Vomiting  pancrelipase  (CREON 36,000 Lipase Units) 1 Capsule(s) Oral daily PRN w/ snacks        T(C): 36.7 (04-15-21 @ 05:01), Max: 36.9 (04-15-21 @ 00:28)  HR: 95 (04-15-21 @ 05:01) (93 - 110)  BP: 119/74 (04-15-21 @ 05:01) (108/70 - 136/85)  RR: 18 (04-15-21 @ 05:01) (16 - 18)  SpO2: 93% (04-15-21 @ 05:01) (93% - 97%)  Wt(kg): --    PHYSICAL EXAM:  GENERAL: NAD, well-groomed, well-developed  HEAD:  Atraumatic, Normocephalic  NECK: Supple, No JVD, Normal thyroid  CHEST/LUNG: Clear to percussion bilaterally; No rales, rhonchi, wheezing, or rubs  HEART: Regular rate and rhythm; No murmurs, rubs, or gallops  ABDOMEN: Soft, Nontender, Nondistended; Bowel sounds present  EXTREMITIES:  2+ Peripheral Pulses, No clubbing, cyanosis, or edema      CAPILLARY BLOOD GLUCOSE      POCT Blood Glucose.: 218 mg/dL (15 Apr 2021 07:45)  POCT Blood Glucose.: 174 mg/dL (14 Apr 2021 23:30)  POCT Blood Glucose.: 179 mg/dL (14 Apr 2021 18:11)                            12.1   7.81  )-----------( 288      ( 15 Apr 2021 05:32 )             37.4       CMP:  04-15 @ 05:32  SGPT 82  Albumin 2.9   Alk Phos 93   Anion Gap 8   SGOT 54   Total Bili 0.3   BUN 10   Calcium Total 8.5   CO2 23   Chloride 108   Creatinine 0.72   eGFR if    eGFR if non    Glucose 197   Potassium 3.6   Protein 7.5   Sodium 139      Thyroid Function Tests:      Diabetes Tests:       Radiology:               
[facility]  [place] [address]  [phone]      Chief Complaint:  Patient is a 21y old  Female who presents with a chief complaint of Acute pancreatitis (15 Apr 2021 11:51)      HPI: Patient is a 20 yo female from [facility] with PMH of autism, chronic pancreatitis, IDDM type 1 (Insulin pump present), and PCOS presents to the ED c/o epigastric pain x 2 days. Patient states that her sx are similar sx to her episodes of chronic pancreatitis. Patient has frequent flare-ups of her pancreatitis, last being a few weeks ago and did not require hospital stay. Patient was scheduled for total pancreatectomy w/ islet cell transplantation into liver this past January, but the procedure was cancelled due to COVID pandemic. Denies ETOH use, no change in medications. Patient states that her pain is well controlled s/p morphine 4mg IV in the ED. Patient notes mild nausea, which is improved s/p zofran. Denies any fevers, chills, CP, SOB, vomiting, diarrhea, constipation, or back pain. Patient received Moderna 2 of 2, last dose ~1 week ago. No known recent COVID exposures.    Allergies:  No Known Allergies      Medications:  ascorbic acid 500 milliGRAM(s) Oral daily  cyanocobalamin 1000 MICROGram(s) Oral daily  dextrose 40% Gel 15 Gram(s) Oral once  dextrose 5% + sodium chloride 0.9%. 1000 milliLiter(s) IV Continuous <Continuous>  dextrose 5%. 1000 milliLiter(s) IV Continuous <Continuous>  dextrose 5%. 1000 milliLiter(s) IV Continuous <Continuous>  dextrose 50% Injectable 25 Gram(s) IV Push once  dextrose 50% Injectable 12.5 Gram(s) IV Push once  dextrose 50% Injectable 25 Gram(s) IV Push once  enoxaparin Injectable 40 milliGRAM(s) SubCutaneous daily  famotidine    Tablet 40 milliGRAM(s) Oral two times a day  fluticasone propionate 50 MICROgram(s)/spray Nasal Spray 1 Spray(s) Both Nostrils two times a day PRN  gabapentin 300 milliGRAM(s) Oral two times a day  glucagon  Injectable 1 milliGRAM(s) IntraMuscular once  insulin lispro (ADMELOG) corrective regimen sliding scale   SubCutaneous three times a day before meals  insulin lispro (ADMELOG) corrective regimen sliding scale   SubCutaneous at bedtime  loratadine 10 milliGRAM(s) Oral daily  morphine  - Injectable 2 milliGRAM(s) IV Push every 4 hours PRN  morphine  - Injectable 3 milliGRAM(s) IV Push every 4 hours PRN  morphine  - Injectable 1 milliGRAM(s) IV Push every 3 hours PRN  multivitamin 1 Tablet(s) Oral daily  ondansetron    Tablet 4 milliGRAM(s) Oral every 6 hours PRN  pancrelipase  (CREON 36,000 Lipase Units) 2 Capsule(s) Oral three times a day with meals  pancrelipase  (CREON 36,000 Lipase Units) 1 Capsule(s) Oral daily PRN  polyethylene glycol 3350 17 Gram(s) Oral daily  ursodiol Capsule 300 milliGRAM(s) Oral <User Schedule>      PMHX/PSHX:  Autism spectrum disorder    Chronic pancreatitis    Diabetes mellitus        Family history:      Social History:     ROS:     General:  No wt loss, fevers, chills, night sweats, fatigue,   Eyes:  Good vision, no reported pain  ENT:  No sore throat, pain, runny nose, dysphagia  CV:  No pain, palpitations, hypo/hypertension  Resp:  No dyspnea, cough, tachypnea, wheezing  GI:  No pain, No nausea, No vomiting, No diarrhea, No constipation, No weight loss, No fever, No pruritis, No rectal bleeding, No tarry stools, No dysphagia,  :  No pain, bleeding, incontinence, nocturia  Muscle:  No pain, weakness  Neuro:  No weakness, tingling, memory problems  Psych:  No fatigue, insomnia, mood problems, depression  Endocrine:  No polyuria, polydipsia, cold/heat intolerance  Heme:  No petechiae, ecchymosis, easy bruisability  Skin:  No rash, tattoos, scars, edema      PHYSICAL EXAM:   Vital Signs:  Vital Signs Last 24 Hrs  T(C): 36.8 (15 Apr 2021 13:00), Max: 36.9 (15 Apr 2021 00:28)  T(F): 98.2 (15 Apr 2021 13:00), Max: 98.5 (15 Apr 2021 00:28)  HR: 82 (15 Apr 2021 13:00) (82 - 108)  BP: 110/68 (15 Apr 2021 13:00) (108/70 - 136/85)  BP(mean): --  RR: 18 (15 Apr 2021 13:00) (16 - 18)  SpO2: 96% (15 Apr 2021 13:00) (93% - 97%)  Daily     Daily     GENERAL:  Appears stated age,   HEENT:  NC/AT,    CHEST:  Full & symmetric excursion,   HEART:  Regular rhythm  ABDOMEN:  Soft, non-tender, non-distended,   EXTEREMITIES:  no cyanosis,clubbing or edema  SKIN:  No rash  NEURO:  Alert,    LABS:                        12.1   7.81  )-----------( 288      ( 15 Apr 2021 05:32 )             37.4     04-15    139  |  108  |  10  ----------------------------<  197<H>  3.6   |  23  |  0.72    Ca    8.5      15 Apr 2021 05:32    TPro  7.5  /  Alb  2.9<L>  /  TBili  0.3  /  DBili  x   /  AST  54<H>  /  ALT  82<H>  /  AlkPhos  93  04-15    LIVER FUNCTIONS - ( 15 Apr 2021 05:32 )  Alb: 2.9 g/dL / Pro: 7.5 g/dL / ALK PHOS: 93 U/L / ALT: 82 U/L / AST: 54 U/L / GGT: x             Urinalysis Basic - ( 2021 18:52 )    Color: Yellow / Appearance: Slightly Turbid / S.015 / pH: x  Gluc: x / Ketone: Moderate  / Bili: Negative / Urobili: Negative   Blood: x / Protein: 15 / Nitrite: Negative   Leuk Esterase: Moderate / RBC: x / WBC 11-25   Sq Epi: x / Non Sq Epi: Few / Bacteria: Moderate          Imaging:          
21y    Female    Patient is a 21y old  Female who presents with a chief complaint of Acute pancreatitis (21 Apr 2021 15:04)      Type: DX year known complications Endocrine Last seen Rx home Hx DKA/HHS, Glucometer checks, needs, weight, diet, exercise  Hx ASCVD, CKD, HF    HPI:  Patient is a 20 yo female from [facility] with PMH of autism, chronic pancreatitis, IDDM type 1 (Insulin pump present), and PCOS presents to the ED c/o epigastric pain x 2 days. Patient states that her sx are similar sx to her episodes of chronic pancreatitis. Patient has frequent flare-ups of her pancreatitis, last being a few weeks ago and did not require hospital stay. Patient was scheduled for total pancreatectomy w/ islet cell transplantation into liver this past January, but the procedure was cancelled due to COVID pandemic. Denies ETOH use, no change in medications. Patient states that her pain is well controlled s/p morphine 4mg IV in the ED. Patient notes mild nausea, which is improved s/p zofran. Denies any fevers, chills, CP, SOB, vomiting, diarrhea, constipation, or back pain. Patient received Moderna 2 of 2, last dose ~1 week ago. No known recent COVID exposures.    In the ED  VS- Temp 97.9F,  -->102, /76, RR 18, SpO2 97% on RA  Labs significant for lipase WNL, lactate 2.2, glu 222, AST 84,   CT a/p (+) acute pancreatitis, underlying chronic pancreatitis, hepatic steatosis  ECG showed sinus tachycardia, no signs of acute ischemia  s/p 1L NS, morphine 4mg IV x2, zofran 4mg IV x1 in the ED (14 Apr 2021 19:23)      PAST MEDICAL & SURGICAL HISTORY:  Autism spectrum disorder    Chronic pancreatitis    Diabetes mellitus  Insulin dependent        MEDICATIONS  (STANDING):  ascorbic acid 500 milliGRAM(s) Oral daily  cyanocobalamin 1000 MICROGram(s) Oral daily  dextrose 40% Gel 15 Gram(s) Oral once  dextrose 5%. 1000 milliLiter(s) (50 mL/Hr) IV Continuous <Continuous>  dextrose 5%. 1000 milliLiter(s) (100 mL/Hr) IV Continuous <Continuous>  dextrose 50% Injectable 25 Gram(s) IV Push once  dextrose 50% Injectable 12.5 Gram(s) IV Push once  dextrose 50% Injectable 25 Gram(s) IV Push once  enoxaparin Injectable 40 milliGRAM(s) SubCutaneous daily  famotidine    Tablet 40 milliGRAM(s) Oral two times a day  gabapentin 300 milliGRAM(s) Oral two times a day  glucagon  Injectable 1 milliGRAM(s) IntraMuscular once  insulin glargine Injectable (LANTUS) 12 Unit(s) SubCutaneous every morning  insulin lispro (ADMELOG) corrective regimen sliding scale   SubCutaneous Before meals and at bedtime  insulin lispro Injectable (ADMELOG) 2 Unit(s) SubCutaneous three times a day before meals  loratadine 10 milliGRAM(s) Oral daily  multivitamin 1 Tablet(s) Oral daily  pancrelipase  (CREON 36,000 Lipase Units) 2 Capsule(s) Oral three times a day with meals  polyethylene glycol 3350 17 Gram(s) Oral daily  ursodiol Capsule 300 milliGRAM(s) Oral <User Schedule>

## 2021-04-21 NOTE — PROGRESS NOTE ADULT - SUBJECTIVE AND OBJECTIVE BOX
CAPILLARY BLOOD GLUCOSE      POCT Blood Glucose.: 324 mg/dL (21 Apr 2021 11:13)  POCT Blood Glucose.: 213 mg/dL (21 Apr 2021 07:26)  POCT Blood Glucose.: 161 mg/dL (21 Apr 2021 00:19)  POCT Blood Glucose.: 210 mg/dL (20 Apr 2021 21:06)  POCT Blood Glucose.: 247 mg/dL (20 Apr 2021 16:50)      Vital Signs Last 24 Hrs  T(C): 36.6 (21 Apr 2021 04:46), Max: 36.9 (20 Apr 2021 21:03)  T(F): 97.9 (21 Apr 2021 04:46), Max: 98.4 (20 Apr 2021 21:03)  HR: 66 (21 Apr 2021 04:46) (66 - 74)  BP: 96/62 (21 Apr 2021 04:46) (93/54 - 106/70)  BP(mean): --  RR: 18 (21 Apr 2021 04:46) (16 - 18)  SpO2: 96% (21 Apr 2021 04:46) (96% - 96%)    General: WN/WD NAD  Respiratory: CTA B/L  CV: RRR, S1S2, no murmurs, rubs or gallops  Abdominal: Soft, NT, ND +BS, Last BM  Extremities: No edema, + peripheral pulses     04-21    142  |  109<H>  |  4<L>  ----------------------------<  198<H>  3.1<L>   |  25  |  0.66    Ca    8.7      21 Apr 2021 06:42    TPro  7.4  /  Alb  3.1<L>  /  TBili  0.3  /  DBili  x   /  AST  158<H>  /  ALT  242<H>  /  AlkPhos  88  04-21      dextrose 40% Gel 15 Gram(s) Oral once  dextrose 50% Injectable 25 Gram(s) IV Push once  dextrose 50% Injectable 12.5 Gram(s) IV Push once  dextrose 50% Injectable 25 Gram(s) IV Push once  glucagon  Injectable 1 milliGRAM(s) IntraMuscular once  insulin glargine Injectable (LANTUS) 12 Unit(s) SubCutaneous every morning  insulin lispro (ADMELOG) corrective regimen sliding scale   SubCutaneous Before meals and at bedtime  insulin lispro Injectable (ADMELOG) 2 Unit(s) SubCutaneous three times a day before meals

## 2021-04-21 NOTE — PROGRESS NOTE ADULT - NSICDXPROBLEMPLAN2_GEN_ALL_CORE_FT
Hx of IDDM1, insulin pump present  -Dr. Perlman consulted: pt wishes to hold off omnipod. Will increase Lantus to 15U qam and admelog to 3U TID. Cont admelog scale.  -Hypoglycemia protocol Hx of IDDM 3c , insulin pump present  -Dr. Perlman consulted: pt wishes to hold off omnipod. Will increase Lantus to 15U qam and admelog to 3U TID. Cont admelog scale.  -Hypoglycemia protocol  - As  per MOM pt is able to take care of her insulin  pump

## 2021-04-21 NOTE — PROGRESS NOTE ADULT - SUBJECTIVE AND OBJECTIVE BOX
Patient is a 21y old  Female who presents with a chief complaint of Acute pancreatitis (17 Apr 2021 10:12)      HPI:  Patient is a 22 yo female from [facility] with PMH of autism, chronic pancreatitis, IDDM type 1 (Insulin pump present), and PCOS presents to the ED c/o epigastric pain x 2 days. Patient states that her sx are similar sx to her episodes of chronic pancreatitis. Patient has frequent flare-ups of her pancreatitis, last being a few weeks ago and did not require hospital stay. Patient was scheduled for total pancreatectomy w/ islet cell transplantation into liver this past January, but the procedure was cancelled due to COVID pandemic. Denies ETOH use, no change in medications. Patient states that her pain is well controlled s/p morphine 4mg IV in the ED. Patient notes mild nausea, which is improved s/p zofran. Denies any fevers, chills, CP, SOB, vomiting, diarrhea, constipation, or back pain. Patient received Moderna 2 of 2, last dose ~1 week ago. No known recent COVID exposures.    In the ED  VS- Temp 97.9F,  -->102, /76, RR 18, SpO2 97% on RA  Labs significant for lipase WNL, lactate 2.2, glu 222, AST 84,   CT a/p (+) acute pancreatitis, underlying chronic pancreatitis, hepatic steatosis  ECG showed sinus tachycardia, no signs of acute ischemia  s/p 1L NS, morphine 4mg IV x2, zofran 4mg IV x1 in the ED (14 Apr 2021 19:23)      INTERVAL HPI:  4/15/21: Patient admitted overnight. Patient seen and examined. Patient tired from late admission. States abdominal pain is improving, now a 4-4.5/10 in epigastric region with radiation to back. States pain regimen and heating pads have been helping her pain. Endorses intermittent nausea. No gallstones seen on imaging, patient denies EtOH use, and lipid panel only mildly elevated. NPO with IVF. Endocrine consulted - Dr C perlman ,for insulin pump.    4/16/21: No acute overnight events. Patient seen and examined. Patient tired, limited ROS given fatigue/pain. Says her abdominal pain and nausea are still present. Patient still NPO and on IVF. GI consulted, pt can be DC'd once she can tolerate PO diet. Endocrine consulted for insulin pump - pt wishes to hold off omnipod, started on Lantus 8U qam and continue Admelog scale q6hr while NPO., On IV Fluids.    4/17/21: Patient seen and examined. Patient tired, and sleeping during interview. States that abdominal pain and nausea are still present. Denies vomiting. On IVF. Pain controlled with current pain regimen. GI advanced diet to clear liquid for Diet. LFT's increasing.    4/18/21: No acute overnight events. Patient seen and examined. Patient tired, sleeping during interview. States that abdominal pain and nausea are still present. Says it worsens when she tries clear liquids. Per RN, patient barely drinking. On IVF. LFTs remain elevated.     4/19/21: No acute overnight events. Patient seen and examined. Patient is tired, sleeping during interview. States that abdominal pain and nausea are more intermittent now rather than constant but still present. Said she tried to have clear liquids yesterday when her mother was visiting but it increased her pain and nausea. On IVF. LFTs still elevated but now downtrending. K repleted.     4/20/21: No acute overnight events. Patient seen and examined. Patient more awake and responsive this AM. States that her abdominal pain is a 1-2/10 this morning, with intermittent episodes of sharp pain that resolve after a few seconds. She is requiring less pain medications. She had more jello and tea yesterday, says she gets some pain with food. Patient endorses having intermittent nausea that relieves with Zofran, states she takes Zofran at home. Blood sugars still elevated, per endo will increase Lantus to 12 U qam and will add admelog 2U tid. K repleted. Pt is ambulating.    4/21/21: No acute overnight events. Patient seen and examined. Diet advanced to low fat regular diet this AM per GI recs. Patient states that she ate a moderate amount but began having abdominal pain and nausea again. Her pain and nausea resolved with Zofran and tramadol. Discussed with GI, will place patient on full liquid diet and re-evaluate. Blood sugars still elevated, per endo increased Lantus to 15U and admelog 3U tid. K repleted. Pt ambulating and sitting out of bed to chair.       OVERNIGHT EVENTS: NONE    Home Medications:  Allegra 180 mg oral tablet: 1 tab(s) orally once a day (14 Apr 2021 20:20)  Cranberry oral capsule: 8400 milligram(s) orally once a day (14 Apr 2021 20:20)  Creon 36,000 units oral delayed release capsule: 2 cap(s) orally 3 times a day (14 Apr 2021 20:20)  Creon 36,000 units oral delayed release capsule: 1 cap(s) orally once a day, As Needed with snacks (14 Apr 2021 20:20)  famotidine 40 mg oral tablet: 1 tab(s) orally once a day (at bedtime) (14 Apr 2021 20:20)  Flonase 50 mcg/inh nasal spray: 1 spray(s) nasal once a day (14 Apr 2021 20:20)  gabapentin 300 mg oral tablet: 1 tab(s) orally 2 times a day (14 Apr 2021 20:20)  Junel 1/20 oral tablet: 1 tab(s) orally once a day (14 Apr 2021 20:20)  Lantus 100 units/mL subcutaneous solution: Dose dependent on Freestlye Candy Sensor-14 (14 Apr 2021 20:20)  MiraLax oral powder for reconstitution: 17 gram(s) orally once a day (14 Apr 2021 20:20)  Multiple Vitamins oral tablet: 1 tab(s) orally once a day (14 Apr 2021 20:20)  NovoLOG 100 units/mL injectable solution: Sliding scale (14 Apr 2021 20:20)  ursodiol 300 mg oral capsule: 1 cap(s) orally once a day (14 Apr 2021 20:20)  Vitamin B12 1000 mcg oral tablet: 1 tab(s) orally once a day (14 Apr 2021 20:20)  Vitamin C 500 mg oral tablet: 1 tab(s) orally once a day (14 Apr 2021 20:20)  Zofran 4 mg oral tablet: 1 tab(s) orally every 6 hours, As Needed (14 Apr 2021 20:20)      MEDICATIONS  (STANDING):  ascorbic acid 500 milliGRAM(s) Oral daily  cyanocobalamin 1000 MICROGram(s) Oral daily  dextrose 40% Gel 15 Gram(s) Oral once  dextrose 5%. 1000 milliLiter(s) (50 mL/Hr) IV Continuous <Continuous>  dextrose 5%. 1000 milliLiter(s) (100 mL/Hr) IV Continuous <Continuous>  dextrose 50% Injectable 25 Gram(s) IV Push once  dextrose 50% Injectable 12.5 Gram(s) IV Push once  dextrose 50% Injectable 25 Gram(s) IV Push once  enoxaparin Injectable 40 milliGRAM(s) SubCutaneous daily  famotidine    Tablet 40 milliGRAM(s) Oral two times a day  gabapentin 300 milliGRAM(s) Oral two times a day  glucagon  Injectable 1 milliGRAM(s) IntraMuscular once  insulin lispro (ADMELOG) corrective regimen sliding scale   SubCutaneous Before meals and at bedtime  insulin lispro Injectable (ADMELOG) 3 Unit(s) SubCutaneous three times a day before meals  loratadine 10 milliGRAM(s) Oral daily  multivitamin 1 Tablet(s) Oral daily  pancrelipase  (CREON 36,000 Lipase Units) 2 Capsule(s) Oral three times a day with meals  polyethylene glycol 3350 17 Gram(s) Oral daily  ursodiol Capsule 300 milliGRAM(s) Oral <User Schedule>    MEDICATIONS  (PRN):  acetaminophen   Tablet .. 650 milliGRAM(s) Oral every 6 hours PRN Mild Pain (1 - 3)  fluticasone propionate 50 MICROgram(s)/spray Nasal Spray 1 Spray(s) Both Nostrils two times a day PRN nasal congestion  morphine  - Injectable 1 milliGRAM(s) IV Push every 4 hours PRN Severe Pain (7 - 10)  ondansetron    Tablet 4 milliGRAM(s) Oral every 6 hours PRN Nausea and/or Vomiting  pancrelipase  (CREON 36,000 Lipase Units) 1 Capsule(s) Oral daily PRN w/ snacks  traMADol 50 milliGRAM(s) Oral every 6 hours PRN Moderate Pain (4 - 6)      Allergies    No Known Allergies    Intolerances        REVIEW OF SYSTEMS: "I had some pain and nausea with the regular diet"  CONSTITUTIONAL: No fever, No chills, No fatigue, No myalgia, No Body ache, No Weakness  EYES: No eye pain,  No visual disturbances, No discharge, NO Redness  ENMT:  No ear pain, No nose bleed, No vertigo; No sinus or throat pain, No Congestion  NECK: No pain, No stiffness  RESPIRATORY: No cough, wheezing, No  hemoptysis, No shortness of breath  CARDIOVASCULAR: No chest pain, palpitations  GASTROINTESTINAL: Abdominal pain resolved with tramadol but previously had +epigastric pain with radiation to back after eating regular low fat diet. +Nausea that relieves with zofran. No vomiting. No diarrhea or constipation. [ 0 ] BM  GENITOURINARY: No dysuria, No frequency, No urgency, No hematuria, or incontinence  NEUROLOGICAL: No headaches, No dizziness, No numbness, No tingling, No tremors, No weakness  EXT: No Swelling, No Pain, No Edema  SKIN:  [ x ] No itching, burning, rashes, or lesions   MUSCULOSKELETAL: No joint pain or swelling; No muscle pain, +Back pain, No extremity pain  PSYCHIATRIC: No depression, anxiety, mood swings or difficulty sleeping at night  PAIN SCALE: [  ] None  [ x ] Other-1-2/10  ROS Unable to obtain due to - [  ] Dementia  [  ] Lethargy  [  ] Sedated [  ] non verbal  REST OF REVIEW Of SYSTEM - [ x ] Normal     Vital Signs Last 24 Hrs  T(C): 37 (21 Apr 2021 13:14), Max: 37 (21 Apr 2021 13:14)  T(F): 98.6 (21 Apr 2021 13:14), Max: 98.6 (21 Apr 2021 13:14)  HR: 78 (21 Apr 2021 13:14) (66 - 78)  BP: 118/83 (21 Apr 2021 13:14) (96/62 - 118/83)  BP(mean): --  RR: 18 (21 Apr 2021 13:14) (17 - 18)  SpO2: 94% (21 Apr 2021 13:14) (94% - 96%)  Finger Stick        04-20 @ 07:01  -  04-21 @ 07:00  --------------------------------------------------------  IN: 1200 mL / OUT: 2200 mL / NET: -1000 mL        PHYSICAL EXAM:  GENERAL:  [x ] NAD , [ x ] well appearing, [  ] Agitated, [  ] Drowsy,  [  ] Lethargy, [  ] confused   HEAD:  [ x ] Normal, [  ] Other  EYES:  [ x ] EOMI, [x  ] PERRLA, [x  ] conjunctiva and sclera clear normal, [  ] Other,  [  ] Pallor,[  ] Discharge  ENMT:  [x  ] Normal, [  x] moist mucous membranes, [  ] Good dentition, [ x ] No Thrush  NECK:  [ x ] Supple, [x  ] No JVD, [ x ] Normal thyroid, [  ] Lymphadenopathy [  ] Other  CHEST/LUNG:  [x  ] Clear to auscultation bilaterally, [ x ] Breath Sounds equal B/L ,  [x  ] No rales, [ x ] No rhonchi  [x  ]  No wheezing  HEART:  [x  ] Regular rate and rhythm, [  ] tachycardia, [  ] Bradycardia,  [  ] irregular  [ x ] No murmurs, No rubs, No gallops, [  ] PPM in place (Mfr:  )  ABDOMEN:  [ x ] Soft, [ x ] Mild tenderness in epigastric region NO R/R/G , [x  ] Nondistended, [ x ] No mass, [ x ] Bowel sounds present, [ x ] obese  NERVOUS SYSTEM:  [x  ] Alert & Oriented X3, [ x ] Nonfocal  [  ] Confusion  [  ] Encephalopathic [  ] Sedated [  ] Unable to assess, [  ] Other-  EXTREMITIES: [x  ] 2+ Peripheral Pulses, No clubbing, No cyanosis,  [  ] edema B/L lower EXT. [  ] PVD stasis skin changes B/L Lower EXT  LYMPH: No lymphadenopathy noted  SKIN:  [x  ] No rashes or lesions, [  ] Pressure Ulcers, [  ] ecchymosis, [  ] Skin Tears, [  ] Other    DIET: Diet: full liquid carbohydrate consistent     LABS:                        11.9   5.38  )-----------( 264      ( 21 Apr 2021 06:42 )             36.7     21 Apr 2021 06:42    142    |  109    |  4      ----------------------------<  198    3.1     |  25     |  0.66     Ca    8.7        21 Apr 2021 06:42    TPro  7.4    /  Alb  3.1    /  TBili  0.3    /  DBili  x      /  AST  158    /  ALT  242    /  AlkPhos  88     21 Apr 2021 06:42                             Anemia Panel:      Thyroid Panel:        Lipase, Serum: 151 U/L (04-16-21 @ 06:46)  Amylase, Serum Total: 22 U/L (04-16-21 @ 06:46)  Lipase, Serum: 274 U/L (04-14-21 @ 16:23)          RADIOLOGY & ADDITIONAL TESTS: none in past 24 hr      HEALTH ISSUES - PROBLEM Dx:  Acute pancreatitis    Autism spectrum disorder    Need for prophylactic measure    Diabetes mellitus  Insulin dependent    Dyslipidemia    Transaminitis            Consultant(s) Notes Reviewed:  [ x ] YES     Care Discussed with [ x ] Consultants, [ x ] Patient, [ x ] Family,- MOM -Jennifer [  ] HCP, [  ] , [  ] Social Service, [x ] RN, [  ] Physical Therapy, [  ] Palliative Care Team  DVT PPX: [ x ] Lovenox, [  ] SC Heparin, [  ] Coumadin, [  ] Xarelto, [  ] Eliquis, [  ] Pradaxa, [  ] IV Heparin drip, [  ] SCD, [  ] Ambulation, [  ] Contraindicated 2/2 GI Bleed, [  ] Contraindicated 2/2  Bleed, [  ] Contraindicated 2/2 Brain Bleed  Advanced Directive: [ x ] None, [  ] DNR/DNI Patient is a 21y old  Female who presents with a chief complaint of Acute pancreatitis (17 Apr 2021 10:12)      HPI:  Patient is a 22 yo female from [facility] with PMH of autism, chronic pancreatitis 2/2 divisum, abnormal gene, IDDM type 3c (Insulin pump present), and PCOS presents to the ED c/o epigastric pain x 2 days. Patient states that her sx are similar sx to her episodes of chronic pancreatitis. Patient has frequent flare-ups of her pancreatitis, last being a few weeks ago and did not require hospital stay. Patient was scheduled for total pancreatectomy w/ islet cell transplantation into liver this past January, but the procedure was cancelled due to COVID pandemic. Denies ETOH use, no change in medications. Patient states that her pain is well controlled s/p morphine 4mg IV in the ED. Patient notes mild nausea, which is improved s/p zofran. Denies any fevers, chills, CP, SOB, vomiting, diarrhea, constipation, or back pain. Patient received Moderna 2 of 2, last dose ~1 week ago. No known recent COVID exposures.    In the ED  VS- Temp 97.9F,  -->102, /76, RR 18, SpO2 97% on RA  Labs significant for lipase WNL, lactate 2.2, glu 222, AST 84,   CT a/p (+) acute pancreatitis, underlying chronic pancreatitis, hepatic steatosis  ECG showed sinus tachycardia, no signs of acute ischemia  s/p 1L NS, morphine 4mg IV x2, zofran 4mg IV x1 in the ED (14 Apr 2021 19:23)      INTERVAL HPI:  4/15/21: Patient admitted overnight. Patient seen and examined. Patient tired from late admission. States abdominal pain is improving, now a 4-4.5/10 in epigastric region with radiation to back. States pain regimen and heating pads have been helping her pain. Endorses intermittent nausea. No gallstones seen on imaging, patient denies EtOH use, and lipid panel only mildly elevated. NPO with IVF. Endocrine consulted - Dr C perlman ,for insulin pump.    4/16/21: No acute overnight events. Patient seen and examined. Patient tired, limited ROS given fatigue/pain. Says her abdominal pain and nausea are still present. Patient still NPO and on IVF. GI consulted, pt can be DC'd once she can tolerate PO diet. Endocrine consulted for insulin pump - pt wishes to hold off omnipod, started on Lantus 8U qam and continue Admelog scale q6hr while NPO., On IV Fluids.    4/17/21: Patient seen and examined. Patient tired, and sleeping during interview. States that abdominal pain and nausea are still present. Denies vomiting. On IVF. Pain controlled with current pain regimen. GI advanced diet to clear liquid for Diet. LFT's increasing.    4/18/21: No acute overnight events. Patient seen and examined. Patient tired, sleeping during interview. States that abdominal pain and nausea are still present. Says it worsens when she tries clear liquids. Per RN, patient barely drinking. On IVF. LFTs remain elevated.     4/19/21: No acute overnight events. Patient seen and examined. Patient is tired, sleeping during interview. States that abdominal pain and nausea are more intermittent now rather than constant but still present. Said she tried to have clear liquids yesterday when her mother was visiting but it increased her pain and nausea. On IVF. LFTs still elevated but now downtrending. K repleted.     4/20/21: No acute overnight events. Patient seen and examined. Patient more awake and responsive this AM. States that her abdominal pain is a 1-2/10 this morning, with intermittent episodes of sharp pain that resolve after a few seconds. She is requiring less pain medications. She had more jello and tea yesterday, says she gets some pain with food. Patient endorses having intermittent nausea that relieves with Zofran, states she takes Zofran at home. Blood sugars still elevated, per endo will increase Lantus to 12 U qam and will add admelog 2U tid. K repleted. Pt is ambulating.    4/21/21: No acute overnight events. Patient seen and examined. Diet advanced to low fat regular diet this AM per GI recs. Patient states that she ate a moderate amount but began having abdominal pain and nausea again. Her pain and nausea resolved with Zofran and tramadol. Discussed with GI, will place patient on full liquid diet and re-evaluate. Blood sugars still elevated, per endo increased Lantus to 15U and admelog 3U tid. K repleted. Pt ambulating and sitting out of bed to chair. Pt had mildly elevated Lactic acid level in ER in setting of Ac Pancreatitis with type 3c  DM , not clinically significant., on full liquid diet.       OVERNIGHT EVENTS: NONE    Home Medications:  Allegra 180 mg oral tablet: 1 tab(s) orally once a day (14 Apr 2021 20:20)  Cranberry oral capsule: 8400 milligram(s) orally once a day (14 Apr 2021 20:20)  Creon 36,000 units oral delayed release capsule: 2 cap(s) orally 3 times a day (14 Apr 2021 20:20)  Creon 36,000 units oral delayed release capsule: 1 cap(s) orally once a day, As Needed with snacks (14 Apr 2021 20:20)  famotidine 40 mg oral tablet: 1 tab(s) orally once a day (at bedtime) (14 Apr 2021 20:20)  Flonase 50 mcg/inh nasal spray: 1 spray(s) nasal once a day (14 Apr 2021 20:20)  gabapentin 300 mg oral tablet: 1 tab(s) orally 2 times a day (14 Apr 2021 20:20)  Junel 1/20 oral tablet: 1 tab(s) orally once a day (14 Apr 2021 20:20)  Lantus 100 units/mL subcutaneous solution: Dose dependent on Freestlye Candy Sensor-14 (14 Apr 2021 20:20)  MiraLax oral powder for reconstitution: 17 gram(s) orally once a day (14 Apr 2021 20:20)  Multiple Vitamins oral tablet: 1 tab(s) orally once a day (14 Apr 2021 20:20)  NovoLOG 100 units/mL injectable solution: Sliding scale (14 Apr 2021 20:20)  ursodiol 300 mg oral capsule: 1 cap(s) orally once a day (14 Apr 2021 20:20)  Vitamin B12 1000 mcg oral tablet: 1 tab(s) orally once a day (14 Apr 2021 20:20)  Vitamin C 500 mg oral tablet: 1 tab(s) orally once a day (14 Apr 2021 20:20)  Zofran 4 mg oral tablet: 1 tab(s) orally every 6 hours, As Needed (14 Apr 2021 20:20)      MEDICATIONS  (STANDING):  ascorbic acid 500 milliGRAM(s) Oral daily  cyanocobalamin 1000 MICROGram(s) Oral daily  dextrose 40% Gel 15 Gram(s) Oral once  dextrose 5%. 1000 milliLiter(s) (50 mL/Hr) IV Continuous <Continuous>  dextrose 5%. 1000 milliLiter(s) (100 mL/Hr) IV Continuous <Continuous>  dextrose 50% Injectable 25 Gram(s) IV Push once  dextrose 50% Injectable 12.5 Gram(s) IV Push once  dextrose 50% Injectable 25 Gram(s) IV Push once  enoxaparin Injectable 40 milliGRAM(s) SubCutaneous daily  famotidine    Tablet 40 milliGRAM(s) Oral two times a day  gabapentin 300 milliGRAM(s) Oral two times a day  glucagon  Injectable 1 milliGRAM(s) IntraMuscular once  insulin lispro (ADMELOG) corrective regimen sliding scale   SubCutaneous Before meals and at bedtime  insulin lispro Injectable (ADMELOG) 3 Unit(s) SubCutaneous three times a day before meals  loratadine 10 milliGRAM(s) Oral daily  multivitamin 1 Tablet(s) Oral daily  pancrelipase  (CREON 36,000 Lipase Units) 2 Capsule(s) Oral three times a day with meals  polyethylene glycol 3350 17 Gram(s) Oral daily  ursodiol Capsule 300 milliGRAM(s) Oral <User Schedule>    MEDICATIONS  (PRN):  acetaminophen   Tablet .. 650 milliGRAM(s) Oral every 6 hours PRN Mild Pain (1 - 3)  fluticasone propionate 50 MICROgram(s)/spray Nasal Spray 1 Spray(s) Both Nostrils two times a day PRN nasal congestion  morphine  - Injectable 1 milliGRAM(s) IV Push every 4 hours PRN Severe Pain (7 - 10)  ondansetron    Tablet 4 milliGRAM(s) Oral every 6 hours PRN Nausea and/or Vomiting  pancrelipase  (CREON 36,000 Lipase Units) 1 Capsule(s) Oral daily PRN w/ snacks  traMADol 50 milliGRAM(s) Oral every 6 hours PRN Moderate Pain (4 - 6)      Allergies    No Known Allergies    Intolerances    REVIEW OF SYSTEMS: "I had some pain and nausea with the regular diet"  CONSTITUTIONAL: No fever, No chills, No fatigue, No myalgia, No Body ache, No Weakness  EYES: No eye pain,  No visual disturbances, No discharge, NO Redness  ENMT:  No ear pain, No nose bleed, No vertigo; No sinus or throat pain, No Congestion  NECK: No pain, No stiffness  RESPIRATORY: No cough, wheezing, No  hemoptysis, No shortness of breath  CARDIOVASCULAR: No chest pain, palpitations  GASTROINTESTINAL: Abdominal pain resolved with tramadol but previously had +epigastric pain with radiation to back after eating regular low fat diet. +Nausea that relieves with zofran. No vomiting. No diarrhea or constipation. [ 0 ] BM  GENITOURINARY: No dysuria, No frequency, No urgency, No hematuria, or incontinence  NEUROLOGICAL: No headaches, No dizziness, No numbness, No tingling, No tremors, No weakness  EXT: No Swelling, No Pain, No Edema  SKIN:  [ x ] No itching, burning, rashes, or lesions   MUSCULOSKELETAL: No joint pain or swelling; No muscle pain, +Back pain, No extremity pain  PSYCHIATRIC: No depression, anxiety, mood swings or difficulty sleeping at night  PAIN SCALE: [  ] None  [ x ] Other-1-2/10  ROS Unable to obtain due to - [  ] Dementia  [  ] Lethargy  [  ] Sedated [  ] non verbal  REST OF REVIEW Of SYSTEM - [ x ] Normal     Vital Signs Last 24 Hrs  T(C): 37 (21 Apr 2021 13:14), Max: 37 (21 Apr 2021 13:14)  T(F): 98.6 (21 Apr 2021 13:14), Max: 98.6 (21 Apr 2021 13:14)  HR: 78 (21 Apr 2021 13:14) (66 - 78)  BP: 118/83 (21 Apr 2021 13:14) (96/62 - 118/83)  BP(mean): --  RR: 18 (21 Apr 2021 13:14) (17 - 18)  SpO2: 94% (21 Apr 2021 13:14) (94% - 96%)  Finger Stick      04-20 @ 07:01  -  04-21 @ 07:00  --------------------------------------------------------  IN: 1200 mL / OUT: 2200 mL / NET: -1000 mL      PHYSICAL EXAM:  GENERAL:  [x ] NAD , [ x ] well appearing, [  ] Agitated, [  ] Drowsy,  [  ] Lethargy, [  ] confused   HEAD:  [ x ] Normal, [  ] Other  EYES:  [ x ] EOMI, [x  ] PERRLA, [x  ] conjunctiva and sclera clear normal, [  ] Other,  [  ] Pallor,[  ] Discharge  ENMT:  [x  ] Normal, [  x] moist mucous membranes, [  ] Good dentition, [ x ] No Thrush  NECK:  [ x ] Supple, [x  ] No JVD, [ x ] Normal thyroid, [  ] Lymphadenopathy [  ] Other  CHEST/LUNG:  [x  ] Clear to auscultation bilaterally, [ x ] Breath Sounds equal B/L ,  [x  ] No rales, [ x ] No rhonchi  [x  ]  No wheezing  HEART:  [x  ] Regular rate and rhythm, [  ] tachycardia, [  ] Bradycardia,  [  ] irregular  [ x ] No murmurs, No rubs, No gallops, [  ] PPM in place (Mfr:  )  ABDOMEN:  [ x ] Soft, [ x ] Mild tenderness in epigastric region NO R/R/G , [x  ] Nondistended, [ x ] No mass, [ x ] Bowel sounds present, [ x ] obese  NERVOUS SYSTEM:  [x  ] Alert & Oriented X3, [ x ] Nonfocal  [  ] Confusion  [  ] Encephalopathic [  ] Sedated [  ] Unable to assess, [  ] Other-  EXTREMITIES: [x  ] 2+ Peripheral Pulses, No clubbing, No cyanosis,  [  ] edema B/L lower EXT. [  ] PVD stasis skin changes B/L Lower EXT  LYMPH: No lymphadenopathy noted  SKIN:  [x  ] No rashes or lesions, [  ] Pressure Ulcers, [  ] ecchymosis, [  ] Skin Tears, [  ] Other    DIET: Diet: full liquid carbohydrate consistent     LABS:                        11.9   5.38  )-----------( 264      ( 21 Apr 2021 06:42 )             36.7     21 Apr 2021 06:42    142    |  109    |  4      ----------------------------<  198    3.1     |  25     |  0.66     Ca    8.7        21 Apr 2021 06:42    TPro  7.4    /  Alb  3.1    /  TBili  0.3    /  DBili  x      /  AST  158    /  ALT  242    /  AlkPhos  88     21 Apr 2021 06:42      Lipase, Serum: 151 U/L (04-16-21 @ 06:46)  Amylase, Serum Total: 22 U/L (04-16-21 @ 06:46)  Lipase, Serum: 274 U/L (04-14-21 @ 16:23)      RADIOLOGY & ADDITIONAL TESTS: none in past 24 hr      HEALTH ISSUES - PROBLEM Dx:  Acute pancreatitis    Autism spectrum disorder    Need for prophylactic measure    Diabetes mellitus  Insulin dependent    Dyslipidemia    Transaminitis      Consultant(s) Notes Reviewed:  [ x ] YES     Care Discussed with [ x ] Consultants, [ x ] Patient, [ x ] Family,- MOM -Jennifer [  ] HCP, [  ] , [ x ] Social Service, [x ] RN, [  ] Physical Therapy, [  ] Palliative Care Team  DVT PPX: [ x ] Lovenox, [  ] SC Heparin, [  ] Coumadin, [  ] Xarelto, [  ] Eliquis, [  ] Pradaxa, [  ] IV Heparin drip, [  ] SCD, [  ] Ambulation, [  ] Contraindicated 2/2 GI Bleed, [  ] Contraindicated 2/2  Bleed, [  ] Contraindicated 2/2 Brain Bleed  Advanced Directive: [ x ] None, [  ] DNR/DNI

## 2021-04-21 NOTE — PROGRESS NOTE ADULT - ASSESSMENT
Patient is a 22 yo female from [facility] with PMH of autism, chronic pancreatitis, IDDM type 1 presents to the ED c/o epigastric pain x past several days admitted for acute pancreatitis. Patient is a 20 [facility] with PMH of autism, chronic pancreatitis, IDDM type 3c presents to the ED c/o epigastric pain x past several days admitted for acute pancreatitis.

## 2021-04-21 NOTE — PROGRESS NOTE ADULT - NSICDXPROBLEMPLAN1_GEN_ALL_CORE_FT
Patient w/ hx of recurrent chronic pancreatitis p/w epigastric pain, found to have acute pancreatitis w/ underlying chronic pancreatitis   on CT scan. ?Etiology   -Does not meet SIRS criteria  -Denies ETOH use, no change in medications, lipid panel with only mild elevation, no gallstones on imaging   -Will DC D5/NS  -Pain control with Tylenol for mild pain, Tramadol 50 q6hr for mod pain, and morphine 1 mg q4hr for severe pain  -Zofran PRN nausea  -Diet advanced to low fat regular this AM, pt with abdominal pain/nausea, will place on full liquid consistent carb for now and advance as tolerated   -On Pancreatic enzyme supplement  -GI consulted, recs appreciated Patient w/ hx of recurrent chronic pancreatitis  2/2 pancreatic divisum & abnormal gene p/w epigastric pain, found to have acute pancreatitis w/ underlying chronic pancreatitis   on CT scan.   -Does not meet SIRS criteria  -Denies ETOH use, no change in medications, lipid panel with only mild elevation, no gallstones on imaging   -Will DC D5/NS  -Pain control with Tylenol for mild pain, Tramadol 50 q6hr for mod pain, and morphine 1 mg q4hr for severe pain  -Zofran PRN nausea  -Diet advanced to low fat regular this AM, pt with abdominal pain/nausea, will place on full liquid consistent carb for now and advance as tolerated   -On Pancreatic enzyme supplement  -GI consulted, Dr Ladd

## 2021-04-21 NOTE — CONSULT NOTE ADULT - CONSULT REASON
21y A1C with Estimated Average Glucose Result: 8.4 % (04-15-21 @ 08:57)   diabetes mellitus uncontrolled type

## 2021-04-21 NOTE — PROGRESS NOTE ADULT - SUBJECTIVE AND OBJECTIVE BOX
INTERVAL HPI/OVERNIGHT EVENTS:  Pt seen and examined   tolerating clears well      MEDICATIONS  (STANDING):  ascorbic acid 500 milliGRAM(s) Oral daily  cyanocobalamin 1000 MICROGram(s) Oral daily  dextrose 40% Gel 15 Gram(s) Oral once  dextrose 5% + sodium chloride 0.9%. 1000 milliLiter(s) (100 mL/Hr) IV Continuous <Continuous>  dextrose 5%. 1000 milliLiter(s) (50 mL/Hr) IV Continuous <Continuous>  dextrose 5%. 1000 milliLiter(s) (100 mL/Hr) IV Continuous <Continuous>  dextrose 50% Injectable 25 Gram(s) IV Push once  dextrose 50% Injectable 12.5 Gram(s) IV Push once  dextrose 50% Injectable 25 Gram(s) IV Push once  enoxaparin Injectable 40 milliGRAM(s) SubCutaneous daily  famotidine    Tablet 40 milliGRAM(s) Oral two times a day  gabapentin 300 milliGRAM(s) Oral two times a day  glucagon  Injectable 1 milliGRAM(s) IntraMuscular once  insulin glargine Injectable (LANTUS) 12 Unit(s) SubCutaneous every morning  insulin lispro (ADMELOG) corrective regimen sliding scale   SubCutaneous Before meals and at bedtime  insulin lispro Injectable (ADMELOG) 2 Unit(s) SubCutaneous three times a day before meals  loratadine 10 milliGRAM(s) Oral daily  multivitamin 1 Tablet(s) Oral daily  pancrelipase  (CREON 36,000 Lipase Units) 2 Capsule(s) Oral three times a day with meals  polyethylene glycol 3350 17 Gram(s) Oral daily  potassium chloride   Powder 40 milliEquivalent(s) Oral every 4 hours  ursodiol Capsule 300 milliGRAM(s) Oral <User Schedule>    MEDICATIONS  (PRN):  acetaminophen   Tablet .. 650 milliGRAM(s) Oral every 6 hours PRN Mild Pain (1 - 3)  fluticasone propionate 50 MICROgram(s)/spray Nasal Spray 1 Spray(s) Both Nostrils two times a day PRN nasal congestion  morphine  - Injectable 1 milliGRAM(s) IV Push every 4 hours PRN Severe Pain (7 - 10)  ondansetron    Tablet 4 milliGRAM(s) Oral every 6 hours PRN Nausea and/or Vomiting  pancrelipase  (CREON 36,000 Lipase Units) 1 Capsule(s) Oral daily PRN w/ snacks  traMADol 50 milliGRAM(s) Oral every 6 hours PRN Moderate Pain (4 - 6)        Allergies    No Known Allergies    Intolerances          ROS:   General:  No wt loss, fevers, chills, night sweats, fatigue,   Eyes:  Good vision, no reported pain  ENT:  No sore throat, pain, runny nose, dysphagia  CV:  No pain, palpitations, hypo/hypertension  Resp:  No dyspnea, cough, tachypnea, wheezing  GI:  No pain, No nausea, No vomiting, No diarrhea, No constipation, No weight loss, No fever, No pruritis, No rectal bleeding, No tarry stools, No dysphagia,  :  No pain, bleeding, incontinence, nocturia  Muscle:  No pain, weakness  Neuro:  No weakness, tingling, memory problems  Psych:  No fatigue, insomnia, mood problems, depression  Endocrine:  No polyuria, polydipsia, cold/heat intolerance  Heme:  No petechiae, ecchymosis, easy bruisability  Skin:  No rash, tattoos, scars, edema    Vital Signs Last 24 Hrs  T(C): 36.6 (21 Apr 2021 04:46), Max: 36.9 (20 Apr 2021 21:03)  T(F): 97.9 (21 Apr 2021 04:46), Max: 98.4 (20 Apr 2021 21:03)  HR: 66 (21 Apr 2021 04:46) (65 - 74)  BP: 96/62 (21 Apr 2021 04:46) (93/54 - 106/70)  BP(mean): --  RR: 18 (21 Apr 2021 04:46) (16 - 18)  SpO2: 96% (21 Apr 2021 04:46) (96% - 97%)    04-20-21 @ 07:01  -  04-21-21 @ 07:00  --------------------------------------------------------  IN: 1200 mL / OUT: 2200 mL / NET: -1000 mL      GENERAL:  Appears stated age, well-groomed, well-nourished, no distress  HEENT:  NC/AT,  conjunctivae clear and pink, no thyromegaly, nodules, adenopathy, no JVD, sclera -anicteric  CHEST:  Full & symmetric excursion, no increased effort, breath sounds clear  HEART:  Regular rhythm, S1, S2, no murmur/rub/S3/S4, no abdominal bruit, no edema  ABDOMEN:  Soft, non-tender, non-distended, normoactive bowel sounds,  no masses ,no hepato-splenomegaly, no signs of chronic liver disease  EXTEREMITIES:  no cyanosis,clubbing or edema  SKIN:  No rash/erythema/ecchymoses/petechiae/wounds/abscess/warm/dry  NEURO:  Alert, oriented, no asterixis, no tremor, no encephalopathy        LABS:                        11.9   5.38  )-----------( 264      ( 21 Apr 2021 06:42 )             36.7     04-21    142  |  109<H>  |  4<L>  ----------------------------<  198<H>  3.1<L>   |  25  |  0.66    Ca    8.7      21 Apr 2021 06:42    TPro  7.4  /  Alb  3.1<L>  /  TBili  0.3  /  DBili  x   /  AST  158<H>  /  ALT  242<H>  /  AlkPhos  88  04-21

## 2021-04-21 NOTE — PROGRESS NOTE ADULT - NSICDXPROBLEMPLAN1_GEN_ALL_CORE_FT
type 3c diabetes  pt wishes to hold off on resuming omnipod at this time  increase lantus 15 units qam  cont admelog scale coverage qac/qhs  increase admelog 3 units 3x/day before meals  goal bg 100-180 in hosp setting

## 2021-04-21 NOTE — PROGRESS NOTE ADULT - ATTENDING COMMENTS
Patient is a 22 yo female from [facility] with PMH of autism, chronic pancreatitis, IDDM type 3c presents to the ED c/o epigastric pain x past several days admitted for acute pancreatitis .improving   Pt seen, examined, case & care plan d/w pt, residents at detail.  D/W GI- DR Ladd, continue current care, change to full liquid diet.  -STOP IV Fluids,  Ambulation   case D/W Pt's primary  pain management MD -[phone].  Total care time is 45 minutes  D/C tomorrow to [facility]. Patient is a 22 yo female from [facility] with PMH of autism, chronic pancreatitis, IDDM type 3c presents to the ED c/o epigastric pain x past several days admitted for acute pancreatitis .improving   Pt seen, examined, case & care plan d/w pt, residents at [facility].  D/W GI- DR Ladd, continue current care, change to full liquid diet.  -STOP IV Fluids,  Ambulation   case D/W Pt's primary  pain management MD -[phone].  D/W MOM -Jennifer at [facility] x 2 today about care plan, Meds & D/C Plan tomorrow .agrees   Total care time is 45 minutes  D/C tomorrow to [facility].

## 2021-04-21 NOTE — CONSULT NOTE ADULT - ASSESSMENT
acute on chronic pancreatitis     npo  iv fluid  pain control  dc planning once tolerating po  d/w mother at bedside  patient with recent eus at [facility]  being eval for pancreatectomy and islet cell transplant at [facility]  
Vital Signs Last 24 Hrs  T(C): 37 (21 Apr 2021 13:14), Max: 37 (21 Apr 2021 13:14)  T(F): 98.6 (21 Apr 2021 13:14), Max: 98.6 (21 Apr 2021 13:14)  HR: 78 (21 Apr 2021 13:14) (66 - 78)  BP: 118/83 (21 Apr 2021 13:14) (96/62 - 118/83)  BP(mean): --  RR: 18 (21 Apr 2021 13:14) (17 - 18)  SpO2: 94% (21 Apr 2021 13:14) (94% - 96%)     eGFR if Non African American: 126 mL/min/1.73M2 (04-21-21 @ 06:42)  eGFR if : 146 mL/min/1.73M2 (04-21-21 @ 06:42)  eGFR if Non African American: 124 mL/min/1.73M2 (04-20-21 @ 08:23)  eGFR if : 144 mL/min/1.73M2 (04-20-21 @ 08:23)  eGFR if Non African American: 130 mL/min/1.73M2 (04-19-21 @ 08:35)  eGFR if : 151 mL/min/1.73M2 (04-19-21 @ 08:35)      CAPILLARY BLOOD GLUCOSE      POCT Blood Glucose.: 288 mg/dL (21 Apr 2021 17:06)  POCT Blood Glucose.: 324 mg/dL (21 Apr 2021 11:13)  POCT Blood Glucose.: 213 mg/dL (21 Apr 2021 07:26)  POCT Blood Glucose.: 161 mg/dL (21 Apr 2021 00:19)  POCT Blood Glucose.: 210 mg/dL (20 Apr 2021 21:06)      DIET: CC >50 %

## 2021-04-22 ENCOUNTER — TRANSCRIPTION ENCOUNTER (OUTPATIENT)
Age: 21
End: 2021-04-22

## 2021-04-22 VITALS
SYSTOLIC BLOOD PRESSURE: 102 MMHG | OXYGEN SATURATION: 95 % | TEMPERATURE: 97 F | DIASTOLIC BLOOD PRESSURE: 66 MMHG | RESPIRATION RATE: 17 BRPM | HEART RATE: 98 BPM

## 2021-04-22 LAB
ALBUMIN SERPL ELPH-MCNC: 3.6 G/DL — SIGNIFICANT CHANGE UP (ref 3.3–5)
ALP SERPL-CCNC: 107 U/L — SIGNIFICANT CHANGE UP (ref 40–120)
ALT FLD-CCNC: 255 U/L — HIGH (ref 12–78)
ANION GAP SERPL CALC-SCNC: 12 MMOL/L — SIGNIFICANT CHANGE UP (ref 5–17)
AST SERPL-CCNC: 135 U/L — HIGH (ref 15–37)
BILIRUB SERPL-MCNC: 0.3 MG/DL — SIGNIFICANT CHANGE UP (ref 0.2–1.2)
BUN SERPL-MCNC: 6 MG/DL — LOW (ref 7–23)
CALCIUM SERPL-MCNC: 9.7 MG/DL — SIGNIFICANT CHANGE UP (ref 8.5–10.1)
CHLORIDE SERPL-SCNC: 104 MMOL/L — SIGNIFICANT CHANGE UP (ref 96–108)
CO2 SERPL-SCNC: 24 MMOL/L — SIGNIFICANT CHANGE UP (ref 22–31)
CREAT SERPL-MCNC: 0.65 MG/DL — SIGNIFICANT CHANGE UP (ref 0.5–1.3)
GLUCOSE SERPL-MCNC: 181 MG/DL — HIGH (ref 70–99)
HCT VFR BLD CALC: 39.9 % — SIGNIFICANT CHANGE UP (ref 34.5–45)
HGB BLD-MCNC: 13.1 G/DL — SIGNIFICANT CHANGE UP (ref 11.5–15.5)
MCHC RBC-ENTMCNC: 29.3 PG — SIGNIFICANT CHANGE UP (ref 27–34)
MCHC RBC-ENTMCNC: 32.8 GM/DL — SIGNIFICANT CHANGE UP (ref 32–36)
MCV RBC AUTO: 89.3 FL — SIGNIFICANT CHANGE UP (ref 80–100)
NRBC # BLD: 0 /100 WBCS — SIGNIFICANT CHANGE UP (ref 0–0)
PLATELET # BLD AUTO: 297 K/UL — SIGNIFICANT CHANGE UP (ref 150–400)
POTASSIUM SERPL-MCNC: 3.3 MMOL/L — LOW (ref 3.5–5.3)
POTASSIUM SERPL-SCNC: 3.3 MMOL/L — LOW (ref 3.5–5.3)
PROT SERPL-MCNC: 8.4 G/DL — HIGH (ref 6–8.3)
RBC # BLD: 4.47 M/UL — SIGNIFICANT CHANGE UP (ref 3.8–5.2)
RBC # FLD: 12.6 % — SIGNIFICANT CHANGE UP (ref 10.3–14.5)
SODIUM SERPL-SCNC: 140 MMOL/L — SIGNIFICANT CHANGE UP (ref 135–145)
WBC # BLD: 6.35 K/UL — SIGNIFICANT CHANGE UP (ref 3.8–10.5)
WBC # FLD AUTO: 6.35 K/UL — SIGNIFICANT CHANGE UP (ref 3.8–10.5)

## 2021-04-22 PROCEDURE — 84702 CHORIONIC GONADOTROPIN TEST: CPT

## 2021-04-22 PROCEDURE — 83036 HEMOGLOBIN GLYCOSYLATED A1C: CPT

## 2021-04-22 PROCEDURE — U0005: CPT

## 2021-04-22 PROCEDURE — 82150 ASSAY OF AMYLASE: CPT

## 2021-04-22 PROCEDURE — 86769 SARS-COV-2 COVID-19 ANTIBODY: CPT

## 2021-04-22 PROCEDURE — 76705 ECHO EXAM OF ABDOMEN: CPT

## 2021-04-22 PROCEDURE — 99285 EMERGENCY DEPT VISIT HI MDM: CPT | Mod: 25

## 2021-04-22 PROCEDURE — 83690 ASSAY OF LIPASE: CPT

## 2021-04-22 PROCEDURE — 83605 ASSAY OF LACTIC ACID: CPT

## 2021-04-22 PROCEDURE — 81001 URINALYSIS AUTO W/SCOPE: CPT

## 2021-04-22 PROCEDURE — 80061 LIPID PANEL: CPT

## 2021-04-22 PROCEDURE — 74177 CT ABD & PELVIS W/CONTRAST: CPT

## 2021-04-22 PROCEDURE — 96374 THER/PROPH/DIAG INJ IV PUSH: CPT | Mod: XU

## 2021-04-22 PROCEDURE — 80076 HEPATIC FUNCTION PANEL: CPT

## 2021-04-22 PROCEDURE — 80053 COMPREHEN METABOLIC PANEL: CPT

## 2021-04-22 PROCEDURE — 80048 BASIC METABOLIC PNL TOTAL CA: CPT

## 2021-04-22 PROCEDURE — U0003: CPT

## 2021-04-22 PROCEDURE — 36415 COLL VENOUS BLD VENIPUNCTURE: CPT

## 2021-04-22 PROCEDURE — 82962 GLUCOSE BLOOD TEST: CPT

## 2021-04-22 PROCEDURE — 93005 ELECTROCARDIOGRAM TRACING: CPT

## 2021-04-22 PROCEDURE — 87635 SARS-COV-2 COVID-19 AMP PRB: CPT

## 2021-04-22 PROCEDURE — 85025 COMPLETE CBC W/AUTO DIFF WBC: CPT

## 2021-04-22 PROCEDURE — 85027 COMPLETE CBC AUTOMATED: CPT

## 2021-04-22 PROCEDURE — 96375 TX/PRO/DX INJ NEW DRUG ADDON: CPT

## 2021-04-22 RX ORDER — POTASSIUM CHLORIDE 20 MEQ
40 PACKET (EA) ORAL EVERY 4 HOURS
Refills: 0 | Status: COMPLETED | OUTPATIENT
Start: 2021-04-22 | End: 2021-04-22

## 2021-04-22 RX ADMIN — LORATADINE 10 MILLIGRAM(S): 10 TABLET ORAL at 11:07

## 2021-04-22 RX ADMIN — Medication 1: at 07:38

## 2021-04-22 RX ADMIN — GABAPENTIN 300 MILLIGRAM(S): 400 CAPSULE ORAL at 06:03

## 2021-04-22 RX ADMIN — ONDANSETRON 4 MILLIGRAM(S): 8 TABLET, FILM COATED ORAL at 11:41

## 2021-04-22 RX ADMIN — Medication 1 TABLET(S): at 11:07

## 2021-04-22 RX ADMIN — Medication 2 CAPSULE(S): at 11:38

## 2021-04-22 RX ADMIN — Medication 2 UNIT(S): at 11:43

## 2021-04-22 RX ADMIN — Medication 500 MILLIGRAM(S): at 11:07

## 2021-04-22 RX ADMIN — PREGABALIN 1000 MICROGRAM(S): 225 CAPSULE ORAL at 11:07

## 2021-04-22 RX ADMIN — FAMOTIDINE 40 MILLIGRAM(S): 10 INJECTION INTRAVENOUS at 06:03

## 2021-04-22 RX ADMIN — URSODIOL 300 MILLIGRAM(S): 250 TABLET, FILM COATED ORAL at 06:02

## 2021-04-22 RX ADMIN — INSULIN GLARGINE 12 UNIT(S): 100 INJECTION, SOLUTION SUBCUTANEOUS at 08:00

## 2021-04-22 RX ADMIN — Medication 2 UNIT(S): at 07:39

## 2021-04-22 RX ADMIN — POLYETHYLENE GLYCOL 3350 17 GRAM(S): 17 POWDER, FOR SOLUTION ORAL at 11:07

## 2021-04-22 RX ADMIN — Medication 40 MILLIEQUIVALENT(S): at 11:06

## 2021-04-22 RX ADMIN — Medication 1 CAPSULE(S): at 11:06

## 2021-04-22 RX ADMIN — ENOXAPARIN SODIUM 40 MILLIGRAM(S): 100 INJECTION SUBCUTANEOUS at 11:06

## 2021-04-22 RX ADMIN — Medication 40 MILLIEQUIVALENT(S): at 15:19

## 2021-04-22 RX ADMIN — Medication 2 CAPSULE(S): at 07:43

## 2021-04-22 NOTE — PROGRESS NOTE ADULT - PROBLEM SELECTOR PROBLEM 1
Acute pancreatitis
Diabetes mellitus
Acute pancreatitis
Diabetes mellitus
Acute pancreatitis

## 2021-04-22 NOTE — DISCHARGE NOTE NURSING/CASE MANAGEMENT/SOCIAL WORK - PATIENT PORTAL LINK FT
You can access the FollowMyHealth Patient Portal offered by [facility] by registering at the following website: [email or web address]. By joining [facility]’s FollowMyHealth portal, you will also be able to view your health information using other applications (apps) compatible with our system.

## 2021-04-22 NOTE — PROGRESS NOTE ADULT - REASON FOR ADMISSION
Acute pancreatitis

## 2021-04-22 NOTE — PROGRESS NOTE ADULT - NSICDXPROBLEMPLAN1_GEN_ALL_CORE_FT
Patient w/ hx of recurrent chronic pancreatitis p/w epigastric pain, found to have acute pancreatitis w/ underlying chronic pancreatitis   on CT scan. ?Etiology   -Denies ETOH use, no change in medications, lipid panel with only mild elevation, no gallstones on imaging   -Pain control with Tylenol for mild pain, Tramadol 50 q6hr for mod pain, and morphine 1 mg q4hr for severe pain  -Zofran PRN nausea  -C/w full liquid diet, per GI can be DC'd on full liquid diet   -On Pancreatic enzyme supplement  -GI consulted, recs appreciated Patient w/ hx of recurrent chronic pancreatitis p/w epigastric pain, found to have acute pancreatitis w/ underlying chronic pancreatitis   on CT scan. ? 2/2 Pancreatic divisum & abnormal genatics   -Improved now  -Denies ETOH use, no change in medications, lipid panel with only mild elevation, no gallstones on imaging   -Pain control with Tylenol for mild pain, Tramadol 50 q6hr for mod pain, and morphine 1 mg q4hr for severe pain  -Zofran PRN nausea  -C/w full liquid diet, per GI can be DC'd on full liquid diet   -On Pancreatic enzyme supplement  -GI consulted, recs appreciated-OK for D/C

## 2021-04-22 NOTE — PROGRESS NOTE ADULT - ATTENDING COMMENTS
Patient is a 22 yo female from [facility] with PMH of autism, chronic pancreatitis, IDDM type 3c presents to the ED c/o epigastric pain x past several days admitted for acute pancreatitis.  -Pt seen, examined, case & care plan d/w pt, residents at detail. pt will resume her Insulin Pump at group home.  D/W Pat Weil -JOAQUINA at detail  D/W Social service for D/C plan.  D/W MOM-RUDDY at detail.  D/C group home Today,   Total D/C care time is 60 minutes.

## 2021-04-22 NOTE — PROGRESS NOTE ADULT - NSICDXPROBLEMPLAN6_GEN_ALL_CORE_FT
VTE ppx: Lovenox

## 2021-04-22 NOTE — PROGRESS NOTE ADULT - ASSESSMENT
acute on chronic pancreatitis       full liquids with glucerna  d/w patient  dc planning once tolerating full liquids  dc home on full liquids  patient with recent eus at [facility]  being eval for pancreatectomy and islet cell transplant at [facility]  d/w mother over phone last night    Advanced care planning was discussed with patient and family.  Advanced care planning forms were reviewed and discussed.  Risks, benefits and alternatives of gastroenterologic procedures were discussed in detail and all questions were answered.    30 minutes spent.

## 2021-04-22 NOTE — PROGRESS NOTE ADULT - SUBJECTIVE AND OBJECTIVE BOX
Patient is a 21y old  Female who presents with a chief complaint of Acute pancreatitis (17 Apr 2021 10:12)      HPI:  Patient is a 22 yo female from skilled nursing with PMH of autism, chronic pancreatitis 2/2 divisum, abnormal gene, IDDM type 3c (Insulin pump present), and PCOS presents to the ED c/o epigastric pain x 2 days. Patient states that her sx are similar sx to her episodes of chronic pancreatitis. Patient has frequent flare-ups of her pancreatitis, last being a few weeks ago and did not require hospital stay. Patient was scheduled for total pancreatectomy w/ islet cell transplantation into liver this past January, but the procedure was cancelled due to COVID pandemic. Denies ETOH use, no change in medications. Patient states that her pain is well controlled s/p morphine 4mg IV in the ED. Patient notes mild nausea, which is improved s/p zofran. Denies any fevers, chills, CP, SOB, vomiting, diarrhea, constipation, or back pain. Patient received Moderna 2 of 2, last dose ~1 week ago. No known recent COVID exposures.    In the ED  VS- Temp 97.9F,  -->102, /76, RR 18, SpO2 97% on RA  Labs significant for lipase WNL, lactate 2.2, glu 222, AST 84,   CT a/p (+) acute pancreatitis, underlying chronic pancreatitis, hepatic steatosis  ECG showed sinus tachycardia, no signs of acute ischemia  s/p 1L NS, morphine 4mg IV x2, zofran 4mg IV x1 in the ED (14 Apr 2021 19:23)      INTERVAL HPI:  4/15/21: Patient admitted overnight. Patient seen and examined. Patient tired from late admission. States abdominal pain is improving, now a 4-4.5/10 in epigastric region with radiation to back. States pain regimen and heating pads have been helping her pain. Endorses intermittent nausea. No gallstones seen on imaging, patient denies EtOH use, and lipid panel only mildly elevated. NPO with IVF. Endocrine consulted - Dr C perlman ,for insulin pump.    4/16/21: No acute overnight events. Patient seen and examined. Patient tired, limited ROS given fatigue/pain. Says her abdominal pain and nausea are still present. Patient still NPO and on IVF. GI consulted, pt can be DC'd once she can tolerate PO diet. Endocrine consulted for insulin pump - pt wishes to hold off omnipod, started on Lantus 8U qam and continue Admelog scale q6hr while NPO., On IV Fluids.    4/17/21: Patient seen and examined. Patient tired, and sleeping during interview. States that abdominal pain and nausea are still present. Denies vomiting. On IVF. Pain controlled with current pain regimen. GI advanced diet to clear liquid for Diet. LFT's increasing.    4/18/21: No acute overnight events. Patient seen and examined. Patient tired, sleeping during interview. States that abdominal pain and nausea are still present. Says it worsens when she tries clear liquids. Per RN, patient barely drinking. On IVF. LFTs remain elevated.     4/19/21: No acute overnight events. Patient seen and examined. Patient is tired, sleeping during interview. States that abdominal pain and nausea are more intermittent now rather than constant but still present. Said she tried to have clear liquids yesterday when her mother was visiting but it increased her pain and nausea. On IVF. LFTs still elevated but now downtrending. K repleted.     4/20/21: No acute overnight events. Patient seen and examined. Patient more awake and responsive this AM. States that her abdominal pain is a 1-2/10 this morning, with intermittent episodes of sharp pain that resolve after a few seconds. She is requiring less pain medications. She had more jello and tea yesterday, says she gets some pain with food. Patient endorses having intermittent nausea that relieves with Zofran, states she takes Zofran at home. Blood sugars still elevated, per endo will increase Lantus to 12 U qam and will add admelog 2U tid. K repleted. Pt is ambulating.    4/21/21: No acute overnight events. Patient seen and examined. Diet advanced to low fat regular diet this AM per GI recs. Patient states that she ate a moderate amount but began having abdominal pain and nausea again. Her pain and nausea resolved with Zofran and tramadol. Discussed with GI, will place patient on full liquid diet and re-evaluate. Blood sugars still elevated, per endo increased Lantus to 15U and admelog 3U tid. K repleted. Pt ambulating and sitting out of bed to chair. Pt had mildly elevated Lactic acid level in ER in setting of Ac Pancreatitis with type 3c  DM , not clinically significant., on full liquid diet.     4/22/21: No acute overnight events. Patient seen and examined. Appears tired. Patient tolerating full liquid diet. Has 1/10 abdominal pain and intermittent nausea that resolves with zofran. Discussed with GI, pt can be DC'd on full liquid diet.       OVERNIGHT EVENTS: NONE    Home Medications:  Allegra 180 mg oral tablet: 1 tab(s) orally once a day (14 Apr 2021 20:20)  Cranberry oral capsule: 8400 milligram(s) orally once a day (14 Apr 2021 20:20)  Creon 36,000 units oral delayed release capsule: 2 cap(s) orally 3 times a day (14 Apr 2021 20:20)  Creon 36,000 units oral delayed release capsule: 1 cap(s) orally once a day, As Needed with snacks (14 Apr 2021 20:20)  famotidine 40 mg oral tablet: 1 tab(s) orally once a day (at bedtime) (14 Apr 2021 20:20)  Flonase 50 mcg/inh nasal spray: 1 spray(s) nasal once a day (14 Apr 2021 20:20)  gabapentin 300 mg oral tablet: 1 tab(s) orally 2 times a day (14 Apr 2021 20:20)  Junel 1/20 oral tablet: 1 tab(s) orally once a day (14 Apr 2021 20:20)  Lantus 100 units/mL subcutaneous solution: Dose dependent on Freestlye Candy Sensor-14 (14 Apr 2021 20:20)  MiraLax oral powder for reconstitution: 17 gram(s) orally once a day (14 Apr 2021 20:20)  Multiple Vitamins oral tablet: 1 tab(s) orally once a day (14 Apr 2021 20:20)  NovoLOG 100 units/mL injectable solution: Sliding scale (14 Apr 2021 20:20)  ursodiol 300 mg oral capsule: 1 cap(s) orally once a day (14 Apr 2021 20:20)  Vitamin B12 1000 mcg oral tablet: 1 tab(s) orally once a day (14 Apr 2021 20:20)  Vitamin C 500 mg oral tablet: 1 tab(s) orally once a day (14 Apr 2021 20:20)  Zofran 4 mg oral tablet: 1 tab(s) orally every 6 hours, As Needed (14 Apr 2021 20:20)      MEDICATIONS  (STANDING):  ascorbic acid 500 milliGRAM(s) Oral daily  cyanocobalamin 1000 MICROGram(s) Oral daily  dextrose 40% Gel 15 Gram(s) Oral once  dextrose 5%. 1000 milliLiter(s) (50 mL/Hr) IV Continuous <Continuous>  dextrose 5%. 1000 milliLiter(s) (100 mL/Hr) IV Continuous <Continuous>  dextrose 50% Injectable 25 Gram(s) IV Push once  dextrose 50% Injectable 12.5 Gram(s) IV Push once  dextrose 50% Injectable 25 Gram(s) IV Push once  enoxaparin Injectable 40 milliGRAM(s) SubCutaneous daily  famotidine    Tablet 40 milliGRAM(s) Oral two times a day  gabapentin 300 milliGRAM(s) Oral two times a day  glucagon  Injectable 1 milliGRAM(s) IntraMuscular once  insulin glargine Injectable (LANTUS) 12 Unit(s) SubCutaneous every morning  insulin lispro (ADMELOG) corrective regimen sliding scale   SubCutaneous Before meals and at bedtime  insulin lispro Injectable (ADMELOG) 2 Unit(s) SubCutaneous three times a day before meals  loratadine 10 milliGRAM(s) Oral daily  multivitamin 1 Tablet(s) Oral daily  pancrelipase  (CREON 36,000 Lipase Units) 2 Capsule(s) Oral three times a day with meals  polyethylene glycol 3350 17 Gram(s) Oral daily  potassium chloride    Tablet ER 40 milliEquivalent(s) Oral every 4 hours  ursodiol Capsule 300 milliGRAM(s) Oral <User Schedule>    MEDICATIONS  (PRN):  acetaminophen   Tablet .. 650 milliGRAM(s) Oral every 6 hours PRN Mild Pain (1 - 3)  fluticasone propionate 50 MICROgram(s)/spray Nasal Spray 1 Spray(s) Both Nostrils two times a day PRN nasal congestion  morphine  - Injectable 1 milliGRAM(s) IV Push every 4 hours PRN Severe Pain (7 - 10)  ondansetron    Tablet 4 milliGRAM(s) Oral every 6 hours PRN Nausea and/or Vomiting  pancrelipase  (CREON 36,000 Lipase Units) 1 Capsule(s) Oral daily PRN w/ snacks  traMADol 50 milliGRAM(s) Oral every 6 hours PRN Moderate Pain (4 - 6)      Allergies    No Known Allergies    Intolerances        REVIEW OF SYSTEMS: "My pain is low today"  CONSTITUTIONAL: No fever, No chills, +Fatigue, No myalgia, No Body ache, No Weakness  EYES: No eye pain,  No visual disturbances, No discharge, NO Redness  ENMT:  No ear pain, No nose bleed, No vertigo; No sinus or throat pain, No Congestion  NECK: No pain, No stiffness  RESPIRATORY: No cough, wheezing, No  hemoptysis, No shortness of breath  CARDIOVASCULAR: No chest pain, palpitations  GASTROINTESTINAL: Mild epigastric with radiation to back. +Nausea that relieves with zofran. No vomiting. No diarrhea or constipation. [  ] BM  GENITOURINARY: No dysuria, No frequency, No urgency, No hematuria, or incontinence  NEUROLOGICAL: No headaches, No dizziness, No numbness, No tingling, No tremors, No weakness  EXT: No Swelling, No Pain, No Edema  SKIN:  [ x ] No itching, burning, rashes, or lesions   MUSCULOSKELETAL: No joint pain or swelling; No muscle pain, +Back pain, No extremity pain  PSYCHIATRIC: No depression, anxiety, mood swings or difficulty sleeping at night  PAIN SCALE: [  ] None  [ x ] Other-1/10  ROS Unable to obtain due to - [  ] Dementia  [  ] Lethargy  [  ] Sedated [  ] non verbal  REST OF REVIEW Of SYSTEM - [ x ] Normal     Vital Signs Last 24 Hrs  T(C): 36.7 (22 Apr 2021 05:14), Max: 37 (21 Apr 2021 13:14)  T(F): 98.1 (22 Apr 2021 05:14), Max: 98.6 (21 Apr 2021 13:14)  HR: 60 (22 Apr 2021 05:14) (60 - 85)  BP: 90/56 (22 Apr 2021 05:14) (90/56 - 118/83)  BP(mean): --  RR: 18 (22 Apr 2021 05:14) (18 - 18)  SpO2: 98% (22 Apr 2021 05:14) (94% - 98%)  Finger Stick          PHYSICAL EXAM:  GENERAL:  [x ] NAD , [ x ] well appearing, [  ] Agitated, [  ] Drowsy,  [  ] Lethargy, [  ] confused   HEAD:  [ x ] Normal, [  ] Other  EYES:  [ x ] EOMI, [x  ] PERRLA, [x  ] conjunctiva and sclera clear normal, [  ] Other,  [  ] Pallor,[  ] Discharge  ENMT:  [x  ] Normal, [  x] moist mucous membranes, [  ] Good dentition, [ x ] No Thrush  NECK:  [ x ] Supple, [x  ] No JVD, [ x ] Normal thyroid, [  ] Lymphadenopathy [  ] Other  CHEST/LUNG:  [x  ] Clear to auscultation bilaterally, [ x ] Breath Sounds equal B/L ,  [x  ] No rales, [ x ] No rhonchi  [x  ]  No wheezing  HEART:  [x  ] Regular rate and rhythm, [  ] tachycardia, [  ] Bradycardia,  [  ] irregular  [ x ] No murmurs, No rubs, No gallops, [  ] PPM in place (Mfr:  )  ABDOMEN:  [ x ] Soft, [ x ] Mild tenderness in epigastric region NO R/R/G , [x  ] Nondistended, [ x ] No mass, [ x ] Bowel sounds present, [ x ] obese  NERVOUS SYSTEM:  [x  ] Alert & Oriented X3, [ x ] Nonfocal  [  ] Confusion  [  ] Encephalopathic [  ] Sedated [  ] Unable to assess, [  ] Other-  EXTREMITIES: [x  ] 2+ Peripheral Pulses, No clubbing, No cyanosis,  [  ] edema B/L lower EXT. [  ] PVD stasis skin changes B/L Lower EXT  LYMPH: No lymphadenopathy noted  SKIN:  [x  ] No rashes or lesions, [  ] Pressure Ulcers, [  ] ecchymosis, [  ] Skin Tears, [  ] Other    DIET: full liquid consistent carbohydrate low fat     LABS:                        13.1   6.35  )-----------( 297      ( 22 Apr 2021 06:47 )             39.9     22 Apr 2021 06:47    140    |  104    |  6      ----------------------------<  181    3.3     |  24     |  0.65     Ca    9.7        22 Apr 2021 06:47    TPro  8.4    /  Alb  3.6    /  TBili  0.3    /  DBili  x      /  AST  135    /  ALT  255    /  AlkPhos  107    22 Apr 2021 06:47                             Anemia Panel:      Thyroid Panel:        Lipase, Serum: 151 U/L (04-16-21 @ 06:46)  Amylase, Serum Total: 22 U/L (04-16-21 @ 06:46)          RADIOLOGY & ADDITIONAL TESTS: none in past 24hr      HEALTH ISSUES - PROBLEM Dx:  Acute pancreatitis    Diabetes mellitus  Insulin dependent    Transaminitis    Dyslipidemia    Autism spectrum disorder    Need for prophylactic measure            Consultant(s) Notes Reviewed:  [ x ] YES     Care Discussed with [ x ] Consultants, [ x ] Patient, [ x ] Family,- MOM -Jennifer [  ] HCP, [  ] , [ x ] Social Service, [x ] RN, [  ] Physical Therapy, [  ] Palliative Care Team  DVT PPX: [ x ] Lovenox, [  ] SC Heparin, [  ] Coumadin, [  ] Xarelto, [  ] Eliquis, [  ] Pradaxa, [  ] IV Heparin drip, [  ] SCD, [  ] Ambulation, [  ] Contraindicated 2/2 GI Bleed, [  ] Contraindicated 2/2  Bleed, [  ] Contraindicated 2/2 Brain Bleed  Advanced Directive: [ x ] None, [  ] DNR/DNI Patient is a 21y old  Female who presents with a chief complaint of Acute pancreatitis (17 Apr 2021 10:12)      HPI:  Patient is a 22 yo female from detention with PMH of autism, chronic pancreatitis 2/2 divisum, abnormal gene, IDDM type 3c (Insulin pump present), and PCOS presents to the ED c/o epigastric pain x 2 days. Patient states that her sx are similar sx to her episodes of chronic pancreatitis. Patient has frequent flare-ups of her pancreatitis, last being a few weeks ago and did not require hospital stay. Patient was scheduled for total pancreatectomy w/ islet cell transplantation into liver this past January, but the procedure was cancelled due to COVID pandemic. Denies ETOH use, no change in medications. Patient states that her pain is well controlled s/p morphine 4mg IV in the ED. Patient notes mild nausea, which is improved s/p zofran. Denies any fevers, chills, CP, SOB, vomiting, diarrhea, constipation, or back pain. Patient received Moderna 2 of 2, last dose ~1 week ago. No known recent COVID exposures.    In the ED  VS- Temp 97.9F,  -->102, /76, RR 18, SpO2 97% on RA  Labs significant for lipase WNL, lactate 2.2, glu 222, AST 84,   CT a/p (+) acute pancreatitis, underlying chronic pancreatitis, hepatic steatosis  ECG showed sinus tachycardia, no signs of acute ischemia  s/p 1L NS, morphine 4mg IV x2, zofran 4mg IV x1 in the ED (14 Apr 2021 19:23)      INTERVAL HPI:  4/15/21: Patient admitted overnight. Patient seen and examined. Patient tired from late admission. States abdominal pain is improving, now a 4-4.5/10 in epigastric region with radiation to back. States pain regimen and heating pads have been helping her pain. Endorses intermittent nausea. No gallstones seen on imaging, patient denies EtOH use, and lipid panel only mildly elevated. NPO with IVF. Endocrine consulted - Dr C perlman ,for insulin pump.    4/16/21: No acute overnight events. Patient seen and examined. Patient tired, limited ROS given fatigue/pain. Says her abdominal pain and nausea are still present. Patient still NPO and on IVF. GI consulted, pt can be DC'd once she can tolerate PO diet. Endocrine consulted for insulin pump - pt wishes to hold off omnipod, started on Lantus 8U qam and continue Admelog scale q6hr while NPO., On IV Fluids.    4/17/21: Patient seen and examined. Patient tired, and sleeping during interview. States that abdominal pain and nausea are still present. Denies vomiting. On IVF. Pain controlled with current pain regimen. GI advanced diet to clear liquid for Diet. LFT's increasing.    4/18/21: No acute overnight events. Patient seen and examined. Patient tired, sleeping during interview. States that abdominal pain and nausea are still present. Says it worsens when she tries clear liquids. Per RN, patient barely drinking. On IVF. LFTs remain elevated.     4/19/21: No acute overnight events. Patient seen and examined. Patient is tired, sleeping during interview. States that abdominal pain and nausea are more intermittent now rather than constant but still present. Said she tried to have clear liquids yesterday when her mother was visiting but it increased her pain and nausea. On IVF. LFTs still elevated but now downtrending. K repleted.     4/20/21: No acute overnight events. Patient seen and examined. Patient more awake and responsive this AM. States that her abdominal pain is a 1-2/10 this morning, with intermittent episodes of sharp pain that resolve after a few seconds. She is requiring less pain medications. She had more jello and tea yesterday, says she gets some pain with food. Patient endorses having intermittent nausea that relieves with Zofran, states she takes Zofran at home. Blood sugars still elevated, per endo will increase Lantus to 12 U qam and will add admelog 2U tid. K repleted. Pt is ambulating.    4/21/21: No acute overnight events. Patient seen and examined. Diet advanced to low fat regular diet this AM per GI recs. Patient states that she ate a moderate amount but began having abdominal pain and nausea again. Her pain and nausea resolved with Zofran and tramadol. Discussed with GI, will place patient on full liquid diet and re-evaluate. Blood sugars still elevated, per endo increased Lantus to 15U and admelog 3U tid. K repleted. Pt ambulating and sitting out of bed to chair. Pt had mildly elevated Lactic acid level in ER in setting of Ac Pancreatitis with type 3c  DM , not clinically significant., on full liquid diet.     4/22/21: No acute overnight events. Patient seen and examined. Appears tired. Patient tolerating full liquid diet. Has 1/10 abdominal pain and intermittent nausea that resolves with zofran. Discussed with GI, pt can be DC'd on full liquid diet. stable for d/c home today.      OVERNIGHT EVENTS: NONE    Home Medications:  Allegra 180 mg oral tablet: 1 tab(s) orally once a day (14 Apr 2021 20:20)  Cranberry oral capsule: 8400 milligram(s) orally once a day (14 Apr 2021 20:20)  Creon 36,000 units oral delayed release capsule: 2 cap(s) orally 3 times a day (14 Apr 2021 20:20)  Creon 36,000 units oral delayed release capsule: 1 cap(s) orally once a day, As Needed with snacks (14 Apr 2021 20:20)  famotidine 40 mg oral tablet: 1 tab(s) orally once a day (at bedtime) (14 Apr 2021 20:20)  Flonase 50 mcg/inh nasal spray: 1 spray(s) nasal once a day (14 Apr 2021 20:20)  gabapentin 300 mg oral tablet: 1 tab(s) orally 2 times a day (14 Apr 2021 20:20)  Junel 1/20 oral tablet: 1 tab(s) orally once a day (14 Apr 2021 20:20)  Lantus 100 units/mL subcutaneous solution: Dose dependent on Freestlye Candy Sensor-14 (14 Apr 2021 20:20)  MiraLax oral powder for reconstitution: 17 gram(s) orally once a day (14 Apr 2021 20:20)  Multiple Vitamins oral tablet: 1 tab(s) orally once a day (14 Apr 2021 20:20)  NovoLOG 100 units/mL injectable solution: Sliding scale (14 Apr 2021 20:20)  ursodiol 300 mg oral capsule: 1 cap(s) orally once a day (14 Apr 2021 20:20)  Vitamin B12 1000 mcg oral tablet: 1 tab(s) orally once a day (14 Apr 2021 20:20)  Vitamin C 500 mg oral tablet: 1 tab(s) orally once a day (14 Apr 2021 20:20)  Zofran 4 mg oral tablet: 1 tab(s) orally every 6 hours, As Needed (14 Apr 2021 20:20)      MEDICATIONS  (STANDING):  ascorbic acid 500 milliGRAM(s) Oral daily  cyanocobalamin 1000 MICROGram(s) Oral daily  dextrose 40% Gel 15 Gram(s) Oral once  dextrose 5%. 1000 milliLiter(s) (50 mL/Hr) IV Continuous <Continuous>  dextrose 5%. 1000 milliLiter(s) (100 mL/Hr) IV Continuous <Continuous>  dextrose 50% Injectable 25 Gram(s) IV Push once  dextrose 50% Injectable 12.5 Gram(s) IV Push once  dextrose 50% Injectable 25 Gram(s) IV Push once  enoxaparin Injectable 40 milliGRAM(s) SubCutaneous daily  famotidine    Tablet 40 milliGRAM(s) Oral two times a day  gabapentin 300 milliGRAM(s) Oral two times a day  glucagon  Injectable 1 milliGRAM(s) IntraMuscular once  insulin glargine Injectable (LANTUS) 12 Unit(s) SubCutaneous every morning  insulin lispro (ADMELOG) corrective regimen sliding scale   SubCutaneous Before meals and at bedtime  insulin lispro Injectable (ADMELOG) 2 Unit(s) SubCutaneous three times a day before meals  loratadine 10 milliGRAM(s) Oral daily  multivitamin 1 Tablet(s) Oral daily  pancrelipase  (CREON 36,000 Lipase Units) 2 Capsule(s) Oral three times a day with meals  polyethylene glycol 3350 17 Gram(s) Oral daily  potassium chloride    Tablet ER 40 milliEquivalent(s) Oral every 4 hours  ursodiol Capsule 300 milliGRAM(s) Oral <User Schedule>    MEDICATIONS  (PRN):  acetaminophen   Tablet .. 650 milliGRAM(s) Oral every 6 hours PRN Mild Pain (1 - 3)  fluticasone propionate 50 MICROgram(s)/spray Nasal Spray 1 Spray(s) Both Nostrils two times a day PRN nasal congestion  morphine  - Injectable 1 milliGRAM(s) IV Push every 4 hours PRN Severe Pain (7 - 10)  ondansetron    Tablet 4 milliGRAM(s) Oral every 6 hours PRN Nausea and/or Vomiting  pancrelipase  (CREON 36,000 Lipase Units) 1 Capsule(s) Oral daily PRN w/ snacks  traMADol 50 milliGRAM(s) Oral every 6 hours PRN Moderate Pain (4 - 6)      Allergies    No Known Allergies    Intolerances        REVIEW OF SYSTEMS: "My pain is low today" i ate well today  CONSTITUTIONAL: No fever, No chills, +Fatigue, No myalgia, No Body ache, No Weakness  EYES: No eye pain,  No visual disturbances, No discharge, NO Redness  ENMT:  No ear pain, No nose bleed, No vertigo; No sinus or throat pain, No Congestion  NECK: No pain, No stiffness  RESPIRATORY: No cough, wheezing, No  hemoptysis, No shortness of breath  CARDIOVASCULAR: No chest pain, palpitations  GASTROINTESTINAL: Mild epigastric with radiation to back. +Nausea that relieves with zofran. No vomiting. No diarrhea or constipation. [  ] BM  GENITOURINARY: No dysuria, No frequency, No urgency, No hematuria, or incontinence  NEUROLOGICAL: No headaches, No dizziness, No numbness, No tingling, No tremors, No weakness  EXT: No Swelling, No Pain, No Edema  SKIN:  [ x ] No itching, burning, rashes, or lesions   MUSCULOSKELETAL: No joint pain or swelling; No muscle pain, +Back pain, No extremity pain  PSYCHIATRIC: No depression, anxiety, mood swings or difficulty sleeping at night  PAIN SCALE: [  ] None  [ x ] Other-1/10  ROS Unable to obtain due to - [  ] Dementia  [  ] Lethargy  [  ] Sedated [  ] non verbal  REST OF REVIEW Of SYSTEM - [ x ] Normal     Vital Signs Last 24 Hrs  T(C): 36.7 (22 Apr 2021 05:14), Max: 37 (21 Apr 2021 13:14)  T(F): 98.1 (22 Apr 2021 05:14), Max: 98.6 (21 Apr 2021 13:14)  HR: 60 (22 Apr 2021 05:14) (60 - 85)  BP: 90/56 (22 Apr 2021 05:14) (90/56 - 118/83)  BP(mean): --  RR: 18 (22 Apr 2021 05:14) (18 - 18)  SpO2: 98% (22 Apr 2021 05:14) (94% - 98%)  Finger Stick          PHYSICAL EXAM:  GENERAL:  [x ] NAD , [ x ] well appearing, [  ] Agitated, [  ] Drowsy,  [  ] Lethargy, [  ] confused   HEAD:  [ x ] Normal, [  ] Other  EYES:  [ x ] EOMI, [x  ] PERRLA, [x  ] conjunctiva and sclera clear normal, [  ] Other,  [  ] Pallor,[  ] Discharge  ENMT:  [x  ] Normal, [  x] moist mucous membranes, [  ] Good dentition, [ x ] No Thrush  NECK:  [ x ] Supple, [x  ] No JVD, [ x ] Normal thyroid, [  ] Lymphadenopathy [  ] Other  CHEST/LUNG:  [x  ] Clear to auscultation bilaterally, [ x ] Breath Sounds equal B/L ,  [x  ] No rales, [ x ] No rhonchi  [x  ]  No wheezing  HEART:  [x  ] Regular rate and rhythm, [  ] tachycardia, [  ] Bradycardia,  [  ] irregular  [ x ] No murmurs, No rubs, No gallops, [  ] PPM in place (Mfr:  )  ABDOMEN:  [ x ] Soft, [ x ] Mild soreness in epigastric region NO R/R/G , [x  ] Nondistended, [ x ] No mass, [ x ] Bowel sounds present, [ x ] obese  NERVOUS SYSTEM:  [x  ] Alert & Oriented X3, [ x ] Nonfocal  [  ] Confusion  [  ] Encephalopathic [  ] Sedated [  ] Unable to assess, [  ] Other-  EXTREMITIES: [x  ] 2+ Peripheral Pulses, No clubbing, No cyanosis,  [  ] edema B/L lower EXT. [  ] PVD stasis skin changes B/L Lower EXT  LYMPH: No lymphadenopathy noted  SKIN:  [x  ] No rashes or lesions, [  ] Pressure Ulcers, [  ] ecchymosis, [  ] Skin Tears, [  ] Other    DIET: full liquid consistent carbohydrate low fat     LABS:                        13.1   6.35  )-----------( 297      ( 22 Apr 2021 06:47 )             39.9     22 Apr 2021 06:47    140    |  104    |  6      ----------------------------<  181    3.3     |  24     |  0.65     Ca    9.7        22 Apr 2021 06:47    TPro  8.4    /  Alb  3.6    /  TBili  0.3    /  DBili  x      /  AST  135    /  ALT  255    /  AlkPhos  107    22 Apr 2021 06:47      Lipase, Serum: 151 U/L (04-16-21 @ 06:46)  Amylase, Serum Total: 22 U/L (04-16-21 @ 06:46)      RADIOLOGY & ADDITIONAL TESTS: none in past 24hr      HEALTH ISSUES - PROBLEM Dx:  Acute pancreatitis    Diabetes mellitus  Insulin dependent    Transaminitis    Dyslipidemia    Autism spectrum disorder    Need for prophylactic measure      Consultant(s) Notes Reviewed:  [ x ] YES     Care Discussed with [ x ] Consultants, [ x ] Patient, [ x ] Family,- MOM -Jennifer [  ] HCP, [  ] , [ x ] Social Service, [x ] RN, [  ] Physical Therapy, [  ] Palliative Care Team  DVT PPX: [ x ] Lovenox, [  ] SC Heparin, [  ] Coumadin, [  ] Xarelto, [  ] Eliquis, [  ] Pradaxa, [  ] IV Heparin drip, [  ] SCD, [  ] Ambulation, [  ] Contraindicated 2/2 GI Bleed, [  ] Contraindicated 2/2  Bleed, [  ] Contraindicated 2/2 Brain Bleed  Advanced Directive: [ x ] None, [  ] DNR/DNI

## 2021-04-22 NOTE — PROGRESS NOTE ADULT - ATTENDING SUPERVISION STATEMENT
ACP/Resident

## 2021-04-22 NOTE — PROGRESS NOTE ADULT - SUBJECTIVE AND OBJECTIVE BOX
CAPILLARY BLOOD GLUCOSE      POCT Blood Glucose.: 184 mg/dL (22 Apr 2021 07:34)  POCT Blood Glucose.: 277 mg/dL (21 Apr 2021 21:04)  POCT Blood Glucose.: 288 mg/dL (21 Apr 2021 17:06)  POCT Blood Glucose.: 324 mg/dL (21 Apr 2021 11:13)      Vital Signs Last 24 Hrs  T(C): 36.7 (22 Apr 2021 05:14), Max: 37 (21 Apr 2021 13:14)  T(F): 98.1 (22 Apr 2021 05:14), Max: 98.6 (21 Apr 2021 13:14)  HR: 60 (22 Apr 2021 05:14) (60 - 85)  BP: 90/56 (22 Apr 2021 05:14) (90/56 - 118/83)  BP(mean): --  RR: 18 (22 Apr 2021 05:14) (18 - 18)  SpO2: 98% (22 Apr 2021 05:14) (94% - 98%)    General: WN/WD NAD  Respiratory: CTA B/L  CV: RRR, S1S2, no murmurs, rubs or gallops  Abdominal: Soft, NT, ND +BS, Last BM  Extremities: No edema, + peripheral pulses     04-22    140  |  104  |  6<L>  ----------------------------<  181<H>  3.3<L>   |  24  |  0.65    Ca    9.7      22 Apr 2021 06:47    TPro  8.4<H>  /  Alb  3.6  /  TBili  0.3  /  DBili  x   /  AST  135<H>  /  ALT  255<H>  /  AlkPhos  107  04-22      dextrose 40% Gel 15 Gram(s) Oral once  dextrose 50% Injectable 25 Gram(s) IV Push once  dextrose 50% Injectable 12.5 Gram(s) IV Push once  dextrose 50% Injectable 25 Gram(s) IV Push once  glucagon  Injectable 1 milliGRAM(s) IntraMuscular once  insulin glargine Injectable (LANTUS) 12 Unit(s) SubCutaneous every morning  insulin lispro (ADMELOG) corrective regimen sliding scale   SubCutaneous Before meals and at bedtime  insulin lispro Injectable (ADMELOG) 2 Unit(s) SubCutaneous three times a day before meals

## 2021-04-22 NOTE — PROGRESS NOTE ADULT - ASSESSMENT
Patient is a 22 yo female from retirement with PMH of autism, chronic pancreatitis, IDDM type 3c presents to the ED c/o epigastric pain x past several days admitted for acute pancreatitis.

## 2021-04-22 NOTE — PROGRESS NOTE ADULT - NSICDXPROBLEMPLAN1_GEN_ALL_CORE_FT
type 3c diabetes  pt wishes to hold off on resuming omnipod at this time  cont lantus 12 units qam  cont admelog scale coverage qac/qhs  cont admelog 2 units 3x/day before meals  goal bg 100-180 in hosp setting

## 2021-04-22 NOTE — PROGRESS NOTE ADULT - NSICDXPROBLEMPLAN2_GEN_ALL_CORE_FT
Hx of IDDM1, insulin pump present  -Dr. Perlman consulted: pt wishes to hold off omnipod. Continue with lantus 12U qam, premeal admelog 2U tid, and sliding scale   -Hypoglycemia protocol Hx of IDDM1, insulin pump present  -Dr. Perlman consulted: pt wishes to hold off omnipod. Continue with lantus 12U qam, premeal admelog 2U tid, and sliding scale   -Hypoglycemia protocol, OK to restart Home Insulin pump on d/c d/w Pat weil RN

## 2021-04-22 NOTE — PROGRESS NOTE ADULT - SUBJECTIVE AND OBJECTIVE BOX
INTERVAL HPI/OVERNIGHT EVENTS:  Pt seen and examined   tolerating diet      MEDICATIONS  (STANDING):  ascorbic acid 500 milliGRAM(s) Oral daily  cyanocobalamin 1000 MICROGram(s) Oral daily  dextrose 40% Gel 15 Gram(s) Oral once  dextrose 5% + sodium chloride 0.9%. 1000 milliLiter(s) (100 mL/Hr) IV Continuous <Continuous>  dextrose 5%. 1000 milliLiter(s) (50 mL/Hr) IV Continuous <Continuous>  dextrose 5%. 1000 milliLiter(s) (100 mL/Hr) IV Continuous <Continuous>  dextrose 50% Injectable 25 Gram(s) IV Push once  dextrose 50% Injectable 12.5 Gram(s) IV Push once  dextrose 50% Injectable 25 Gram(s) IV Push once  enoxaparin Injectable 40 milliGRAM(s) SubCutaneous daily  famotidine    Tablet 40 milliGRAM(s) Oral two times a day  gabapentin 300 milliGRAM(s) Oral two times a day  glucagon  Injectable 1 milliGRAM(s) IntraMuscular once  insulin glargine Injectable (LANTUS) 12 Unit(s) SubCutaneous every morning  insulin lispro (ADMELOG) corrective regimen sliding scale   SubCutaneous Before meals and at bedtime  insulin lispro Injectable (ADMELOG) 2 Unit(s) SubCutaneous three times a day before meals  loratadine 10 milliGRAM(s) Oral daily  multivitamin 1 Tablet(s) Oral daily  pancrelipase  (CREON 36,000 Lipase Units) 2 Capsule(s) Oral three times a day with meals  polyethylene glycol 3350 17 Gram(s) Oral daily  potassium chloride   Powder 40 milliEquivalent(s) Oral every 4 hours  ursodiol Capsule 300 milliGRAM(s) Oral <User Schedule>    MEDICATIONS  (PRN):  acetaminophen   Tablet .. 650 milliGRAM(s) Oral every 6 hours PRN Mild Pain (1 - 3)  fluticasone propionate 50 MICROgram(s)/spray Nasal Spray 1 Spray(s) Both Nostrils two times a day PRN nasal congestion  morphine  - Injectable 1 milliGRAM(s) IV Push every 4 hours PRN Severe Pain (7 - 10)  ondansetron    Tablet 4 milliGRAM(s) Oral every 6 hours PRN Nausea and/or Vomiting  pancrelipase  (CREON 36,000 Lipase Units) 1 Capsule(s) Oral daily PRN w/ snacks  traMADol 50 milliGRAM(s) Oral every 6 hours PRN Moderate Pain (4 - 6)        Allergies    No Known Allergies    Intolerances          ROS:   General:  No wt loss, fevers, chills, night sweats, fatigue,   Eyes:  Good vision, no reported pain  ENT:  No sore throat, pain, runny nose, dysphagia  CV:  No pain, palpitations, hypo/hypertension  Resp:  No dyspnea, cough, tachypnea, wheezing  GI:  No pain, No nausea, No vomiting, No diarrhea, No constipation, No weight loss, No fever, No pruritis, No rectal bleeding, No tarry stools, No dysphagia,  :  No pain, bleeding, incontinence, nocturia  Muscle:  No pain, weakness  Neuro:  No weakness, tingling, memory problems  Psych:  No fatigue, insomnia, mood problems, depression  Endocrine:  No polyuria, polydipsia, cold/heat intolerance  Heme:  No petechiae, ecchymosis, easy bruisability  Skin:  No rash, tattoos, scars, edema    Vital Signs Last 24 Hrs  T(C): 36.6 (21 Apr 2021 04:46), Max: 36.9 (20 Apr 2021 21:03)  T(F): 97.9 (21 Apr 2021 04:46), Max: 98.4 (20 Apr 2021 21:03)  HR: 66 (21 Apr 2021 04:46) (65 - 74)  BP: 96/62 (21 Apr 2021 04:46) (93/54 - 106/70)  BP(mean): --  RR: 18 (21 Apr 2021 04:46) (16 - 18)  SpO2: 96% (21 Apr 2021 04:46) (96% - 97%)    04-20-21 @ 07:01  -  04-21-21 @ 07:00  --------------------------------------------------------  IN: 1200 mL / OUT: 2200 mL / NET: -1000 mL      GENERAL:  Appears stated age, well-groomed, well-nourished, no distress  HEENT:  NC/AT,  conjunctivae clear and pink, no thyromegaly, nodules, adenopathy, no JVD, sclera -anicteric  CHEST:  Full & symmetric excursion, no increased effort, breath sounds clear  HEART:  Regular rhythm, S1, S2, no murmur/rub/S3/S4, no abdominal bruit, no edema  ABDOMEN:  Soft, non-tender, non-distended, normoactive bowel sounds,  no masses ,no hepato-splenomegaly, no signs of chronic liver disease  EXTEREMITIES:  no cyanosis,clubbing or edema  SKIN:  No rash/erythema/ecchymoses/petechiae/wounds/abscess/warm/dry  NEURO:  Alert, oriented, no asterixis, no tremor, no encephalopathy        LABS:                        11.9   5.38  )-----------( 264      ( 21 Apr 2021 06:42 )             36.7     04-21    142  |  109<H>  |  4<L>  ----------------------------<  198<H>  3.1<L>   |  25  |  0.66    Ca    8.7      21 Apr 2021 06:42    TPro  7.4  /  Alb  3.1<L>  /  TBili  0.3  /  DBili  x   /  AST  158<H>  /  ALT  242<H>  /  AlkPhos  88  04-21

## 2021-04-22 NOTE — PROGRESS NOTE ADULT - PROVIDER SPECIALTY LIST ADULT
Gastroenterology
Endocrinology
Gastroenterology
Internal Medicine
Internal Medicine
Gastroenterology
Internal Medicine
Gastroenterology
Endocrinology
Gastroenterology
Internal Medicine
Endocrinology
Endocrinology
Internal Medicine

## 2021-05-06 ENCOUNTER — NON-APPOINTMENT (OUTPATIENT)
Age: 21
End: 2021-05-06

## 2021-05-06 DIAGNOSIS — Z82.49 FAMILY HISTORY OF ISCHEMIC HEART DISEASE AND OTHER DISEASES OF THE CIRCULATORY SYSTEM: ICD-10-CM

## 2021-05-06 DIAGNOSIS — Z82.0 FAMILY HISTORY OF EPILEPSY AND OTHER DISEASES OF THE NERVOUS SYSTEM: ICD-10-CM

## 2021-05-06 DIAGNOSIS — Z87.19 PERSONAL HISTORY OF OTHER DISEASES OF THE DIGESTIVE SYSTEM: ICD-10-CM

## 2021-05-06 DIAGNOSIS — Z80.49 FAMILY HISTORY OF MALIGNANT NEOPLASM OF OTHER GENITAL ORGANS: ICD-10-CM

## 2021-05-06 DIAGNOSIS — Z82.3 FAMILY HISTORY OF STROKE: ICD-10-CM

## 2021-05-06 DIAGNOSIS — Z81.1 FAMILY HISTORY OF ALCOHOL ABUSE AND DEPENDENCE: ICD-10-CM

## 2021-05-06 DIAGNOSIS — Z83.438 FAMILY HISTORY OF OTHER DISORDER OF LIPOPROTEIN METABOLISM AND OTHER LIPIDEMIA: ICD-10-CM

## 2021-05-06 PROBLEM — Z00.00 ENCOUNTER FOR PREVENTIVE HEALTH EXAMINATION: Noted: 2021-05-06

## 2021-05-07 ENCOUNTER — NON-APPOINTMENT (OUTPATIENT)
Age: 21
End: 2021-05-07

## 2021-05-07 DIAGNOSIS — Z86.39 PERSONAL HISTORY OF OTHER ENDOCRINE, NUTRITIONAL AND METABOLIC DISEASE: ICD-10-CM

## 2021-05-07 DIAGNOSIS — E28.2 POLYCYSTIC OVARIAN SYNDROME: ICD-10-CM

## 2021-05-07 DIAGNOSIS — K85.00 IDIOPATHIC ACUTE PANCREATITIS WITHOUT NECROSIS OR INFECTION: ICD-10-CM

## 2021-05-07 DIAGNOSIS — F80.9 DEVELOPMENTAL DISORDER OF SPEECH AND LANGUAGE, UNSPECIFIED: ICD-10-CM

## 2021-05-07 DIAGNOSIS — Z86.69 PERSONAL HISTORY OF OTHER DISEASES OF THE NERVOUS SYSTEM AND SENSE ORGANS: ICD-10-CM

## 2021-05-07 DIAGNOSIS — Z83.438 FAMILY HISTORY OF OTHER DISORDER OF LIPOPROTEIN METABOLISM AND OTHER LIPIDEMIA: ICD-10-CM

## 2021-05-07 DIAGNOSIS — Z81.1 FAMILY HISTORY OF ALCOHOL ABUSE AND DEPENDENCE: ICD-10-CM

## 2021-05-07 DIAGNOSIS — K59.01 SLOW TRANSIT CONSTIPATION: ICD-10-CM

## 2021-05-07 DIAGNOSIS — Z82.3 FAMILY HISTORY OF STROKE: ICD-10-CM

## 2021-05-07 DIAGNOSIS — R73.01 IMPAIRED FASTING GLUCOSE: ICD-10-CM

## 2021-05-07 DIAGNOSIS — Z80.49 FAMILY HISTORY OF MALIGNANT NEOPLASM OF OTHER GENITAL ORGANS: ICD-10-CM

## 2021-05-07 DIAGNOSIS — Z82.0 FAMILY HISTORY OF EPILEPSY AND OTHER DISEASES OF THE NERVOUS SYSTEM: ICD-10-CM

## 2021-05-07 DIAGNOSIS — Z87.09 PERSONAL HISTORY OF OTHER DISEASES OF THE RESPIRATORY SYSTEM: ICD-10-CM

## 2021-05-07 DIAGNOSIS — F32.9 MAJOR DEPRESSIVE DISORDER, SINGLE EPISODE, UNSPECIFIED: ICD-10-CM

## 2021-05-07 DIAGNOSIS — Z78.9 OTHER SPECIFIED HEALTH STATUS: ICD-10-CM

## 2021-05-07 DIAGNOSIS — N91.1 SECONDARY AMENORRHEA: ICD-10-CM

## 2021-05-07 DIAGNOSIS — Z82.49 FAMILY HISTORY OF ISCHEMIC HEART DISEASE AND OTHER DISEASES OF THE CIRCULATORY SYSTEM: ICD-10-CM

## 2021-05-10 ENCOUNTER — APPOINTMENT (OUTPATIENT)
Dept: FAMILY MEDICINE | Facility: CLINIC | Age: 21
End: 2021-05-10

## 2021-05-13 RX ORDER — BACILLUS COAGULANS/INULIN 1B-250 MG
CAPSULE ORAL
Refills: 0 | Status: ACTIVE | COMMUNITY

## 2021-05-13 RX ORDER — BLOOD SUGAR DIAGNOSTIC
STRIP MISCELLANEOUS
Qty: 100 | Refills: 0 | Status: ACTIVE | COMMUNITY
Start: 2021-04-14

## 2021-05-13 RX ORDER — PANCRELIPASE 36000; 180000; 114000 [USP'U]/1; [USP'U]/1; [USP'U]/1
36000-114000 CAPSULE, DELAYED RELEASE PELLETS ORAL
Qty: 300 | Refills: 0 | Status: ACTIVE | COMMUNITY
Start: 2020-11-11

## 2021-05-13 RX ORDER — NITROFURANTOIN (MONOHYDRATE/MACROCRYSTALS) 25; 75 MG/1; MG/1
100 CAPSULE ORAL
Qty: 10 | Refills: 0 | Status: DISCONTINUED | COMMUNITY
Start: 2020-12-25 | End: 2021-05-13

## 2021-05-13 RX ORDER — CEFPODOXIME PROXETIL 200 MG/1
200 TABLET, FILM COATED ORAL
Qty: 14 | Refills: 0 | Status: DISCONTINUED | COMMUNITY
Start: 2021-02-11 | End: 2021-05-13

## 2021-05-13 RX ORDER — SULFAMETHOXAZOLE AND TRIMETHOPRIM 800; 160 MG/1; MG/1
800-160 TABLET ORAL
Qty: 14 | Refills: 0 | Status: DISCONTINUED | COMMUNITY
Start: 2021-01-11 | End: 2021-05-13

## 2021-05-13 RX ORDER — INSULIN ASPART 100 [IU]/ML
100 INJECTION, SOLUTION INTRAVENOUS; SUBCUTANEOUS
Qty: 50 | Refills: 0 | Status: ACTIVE | COMMUNITY
Start: 2021-02-26

## 2021-05-13 RX ORDER — FLASH GLUCOSE SENSOR
KIT MISCELLANEOUS
Qty: 2 | Refills: 0 | Status: ACTIVE | COMMUNITY
Start: 2021-05-06

## 2021-05-13 RX ORDER — INSULIN ASPART 100 [IU]/ML
100 INJECTION, SOLUTION INTRAVENOUS; SUBCUTANEOUS
Refills: 0 | Status: ACTIVE | COMMUNITY

## 2021-05-13 RX ORDER — NORETHINDRONE ACETATE AND ETHINYL ESTRADIOL AND FERROUS FUMARATE 1MG-20(21)
1-20 KIT ORAL
Qty: 84 | Refills: 0 | Status: ACTIVE | COMMUNITY
Start: 2020-11-18

## 2021-05-13 RX ORDER — PHENAZOPYRIDINE HYDROCHLORIDE 200 MG/1
200 TABLET ORAL
Qty: 6 | Refills: 0 | Status: DISCONTINUED | COMMUNITY
Start: 2021-01-11 | End: 2021-05-13

## 2021-05-13 RX ORDER — INSULIN GLARGINE 100 [IU]/ML
100 INJECTION, SOLUTION SUBCUTANEOUS
Refills: 0 | Status: ACTIVE | COMMUNITY

## 2021-05-13 RX ORDER — ONDANSETRON 4 MG/1
4 TABLET ORAL
Qty: 30 | Refills: 0 | Status: ACTIVE | COMMUNITY
Start: 2020-10-08

## 2021-05-13 RX ORDER — FLASH GLUCOSE SCANNING READER
EACH MISCELLANEOUS
Qty: 1 | Refills: 0 | Status: ACTIVE | COMMUNITY
Start: 2021-03-05

## 2021-05-13 RX ORDER — MULTIVIT-MIN/FOLIC/VIT K/LYCOP 400-300MCG
25 MCG TABLET ORAL DAILY
Refills: 0 | Status: ACTIVE | COMMUNITY

## 2021-05-14 ENCOUNTER — APPOINTMENT (OUTPATIENT)
Dept: FAMILY MEDICINE | Facility: CLINIC | Age: 21
End: 2021-05-14
Payer: COMMERCIAL

## 2021-05-14 VITALS
WEIGHT: 148 LBS | HEIGHT: 61 IN | TEMPERATURE: 98.6 F | SYSTOLIC BLOOD PRESSURE: 120 MMHG | OXYGEN SATURATION: 98 % | HEART RATE: 113 BPM | DIASTOLIC BLOOD PRESSURE: 80 MMHG | BODY MASS INDEX: 27.94 KG/M2

## 2021-05-14 VITALS
OXYGEN SATURATION: 98 % | HEART RATE: 113 BPM | DIASTOLIC BLOOD PRESSURE: 80 MMHG | WEIGHT: 148 LBS | HEIGHT: 61 IN | SYSTOLIC BLOOD PRESSURE: 120 MMHG | TEMPERATURE: 98.6 F | BODY MASS INDEX: 27.94 KG/M2

## 2021-05-14 PROCEDURE — 99213 OFFICE O/P EST LOW 20 MIN: CPT

## 2021-05-14 PROCEDURE — 99072 ADDL SUPL MATRL&STAF TM PHE: CPT

## 2021-05-14 NOTE — PLAN
[FreeTextEntry1] : Patient has a long term history of pancreatitis.  She is advised to make sure to watch her diet,and to take her medications as prescribed.  She does not hydrate well, but she will keep working on this. She is advised to follow up with endo and the surgeon for further follow up and evaluation.  She has seen GI, and has been advised that she is stable.

## 2021-05-14 NOTE — HISTORY OF PRESENT ILLNESS
[FreeTextEntry1] : hospital f/u- s/p pancreatitis [de-identified] : Patient presents to follow up for her most recent hospitalization for pancreatitis.  She was admitted from 4/14-4/22, and states that prior, she is unsure what she ate prior to her symptoms starting, but admits that she had not been hydrating.  Since her release, she has been working on hydrating more, and she is beginning to work on her diet.  She is taking the urodiol and Creon as prescribed, and did see GI last week.  She will be following up with endo as well, and then they will follow up with her surgeon after that.  Surgery is still pending for her, as this was postponed from 1/21 due to the pandemic.  She does feel OK at the moment, and she denies any new symptoms.

## 2021-07-06 ENCOUNTER — RX RENEWAL (OUTPATIENT)
Age: 21
End: 2021-07-06

## 2021-07-22 ENCOUNTER — APPOINTMENT (OUTPATIENT)
Dept: FAMILY MEDICINE | Facility: CLINIC | Age: 21
End: 2021-07-22

## 2021-07-22 ENCOUNTER — LABORATORY RESULT (OUTPATIENT)
Age: 21
End: 2021-07-22

## 2021-07-23 ENCOUNTER — APPOINTMENT (OUTPATIENT)
Dept: FAMILY MEDICINE | Facility: CLINIC | Age: 21
End: 2021-07-23
Payer: COMMERCIAL

## 2021-07-23 VITALS
WEIGHT: 148 LBS | SYSTOLIC BLOOD PRESSURE: 122 MMHG | DIASTOLIC BLOOD PRESSURE: 82 MMHG | OXYGEN SATURATION: 98 % | TEMPERATURE: 98 F | BODY MASS INDEX: 27.94 KG/M2 | HEIGHT: 61 IN | HEART RATE: 110 BPM

## 2021-07-23 LAB
25(OH)D3 SERPL-MCNC: 44.9 NG/ML
ALBUMIN SERPL ELPH-MCNC: 4.5 G/DL
ALP BLD-CCNC: 116 U/L
ALT SERPL-CCNC: 157 U/L
AMYLASE/CREAT SERPL: 27 U/L
ANION GAP SERPL CALC-SCNC: 13 MMOL/L
AST SERPL-CCNC: 91 U/L
BASOPHILS # BLD AUTO: 0.01 K/UL
BASOPHILS NFR BLD AUTO: 0.2 %
BILIRUB SERPL-MCNC: 0.2 MG/DL
BUN SERPL-MCNC: 17 MG/DL
CALCIUM SERPL-MCNC: 9.8 MG/DL
CHLORIDE SERPL-SCNC: 102 MMOL/L
CHOLEST SERPL-MCNC: 201 MG/DL
CO2 SERPL-SCNC: 23 MMOL/L
CREAT SERPL-MCNC: 0.65 MG/DL
EOSINOPHIL # BLD AUTO: 0.1 K/UL
EOSINOPHIL NFR BLD AUTO: 1.9 %
ESTIMATED AVERAGE GLUCOSE: 226 MG/DL
GLUCOSE SERPL-MCNC: 260 MG/DL
HBA1C MFR BLD HPLC: 9.5 %
HCT VFR BLD CALC: 45.8 %
HDLC SERPL-MCNC: 41 MG/DL
HGB BLD-MCNC: 13.9 G/DL
IMM GRANULOCYTES NFR BLD AUTO: 0.2 %
LDLC SERPL CALC-MCNC: 120 MG/DL
LPL SERPL-CCNC: 11 U/L
LYMPHOCYTES # BLD AUTO: 2.39 K/UL
LYMPHOCYTES NFR BLD AUTO: 45.9 %
MAN DIFF?: NORMAL
MCHC RBC-ENTMCNC: 29.2 PG
MCHC RBC-ENTMCNC: 30.3 GM/DL
MCV RBC AUTO: 96.2 FL
MONOCYTES # BLD AUTO: 0.39 K/UL
MONOCYTES NFR BLD AUTO: 7.5 %
NEUTROPHILS # BLD AUTO: 2.31 K/UL
NEUTROPHILS NFR BLD AUTO: 44.3 %
NONHDLC SERPL-MCNC: 160 MG/DL
PLATELET # BLD AUTO: 257 K/UL
POTASSIUM SERPL-SCNC: 4.7 MMOL/L
PROT SERPL-MCNC: 7.6 G/DL
RBC # BLD: 4.76 M/UL
RBC # FLD: 13.1 %
SODIUM SERPL-SCNC: 137 MMOL/L
T3RU NFR SERPL: 1.2 TBI
TRIGL SERPL-MCNC: 198 MG/DL
TSH SERPL-ACNC: 0.6 UIU/ML
WBC # FLD AUTO: 5.21 K/UL

## 2021-07-23 PROCEDURE — 99395 PREV VISIT EST AGE 18-39: CPT

## 2021-07-23 PROCEDURE — 99072 ADDL SUPL MATRL&STAF TM PHE: CPT

## 2021-07-23 NOTE — HEALTH RISK ASSESSMENT
[Good] : ~his/her~ current health as good [] : No [No] : No [0] : 2) Feeling down, depressed, or hopeless: Not at all (0) [Unemployed] : unemployed [High School] : high school [Single] : single [Sexually Active] : not sexually active [Reports changes in hearing] : Reports no changes in hearing [Reports changes in vision] : Reports no changes in vision [Reports normal functional visual acuity (ie: able to read med bottle)] : Reports normal functional visual acuity [Reports changes in dental health] : Reports no changes in dental health [Smoke Detector] : smoke detector [Carbon Monoxide Detector] : carbon monoxide detector [Safety elements used in home] : safety elements used in home [Seat Belt] :  uses seat belt [Sunscreen] : uses sunscreen [de-identified] : [facility]

## 2021-07-23 NOTE — PLAN
[FreeTextEntry1] : Fasting labs were performed yesterday and reviewed today   \par Advised to undergo any preventative testing that is overdue . \par Forms completed , Aid provided with Immunization flowsheet , last PE OV note , past labs results \par Patient will continue healthy eating and exercise and followup in one year for PE\par

## 2021-07-23 NOTE — HISTORY OF PRESENT ILLNESS
[Formal Caregiver] : formal caregiver [de-identified] : Patient is here for yearly physical. She denies any new complaints . She is UTD with all preventative testing , dentist, Optho and Derm .\par Patient is eating healthy  and compliant with medications  .Patient is now in new Facility and here with aid.  She is UTD with Dr Hernandez , Endocrinologist , labs performed yesterday will be faxed . \par \par

## 2021-09-03 ENCOUNTER — RX RENEWAL (OUTPATIENT)
Age: 21
End: 2021-09-03

## 2021-09-03 DIAGNOSIS — K59.00 CONSTIPATION, UNSPECIFIED: ICD-10-CM

## 2021-09-03 RX ORDER — INSULIN GLARGINE 100 [IU]/ML
0 INJECTION, SOLUTION SUBCUTANEOUS
Qty: 0 | Refills: 0 | DISCHARGE

## 2021-09-03 RX ORDER — FAMOTIDINE 10 MG/ML
1 INJECTION INTRAVENOUS
Qty: 0 | Refills: 0 | DISCHARGE

## 2021-09-03 RX ORDER — LIPASE/PROTEASE/AMYLASE 16-48-48K
2 CAPSULE,DELAYED RELEASE (ENTERIC COATED) ORAL
Qty: 0 | Refills: 0 | DISCHARGE

## 2021-09-03 RX ORDER — PREGABALIN 225 MG/1
1 CAPSULE ORAL
Qty: 0 | Refills: 0 | DISCHARGE

## 2021-09-03 RX ORDER — NORETHINDRONE AND ETHINYL ESTRADIOL 0.4-0.035
1 KIT ORAL
Qty: 0 | Refills: 0 | DISCHARGE

## 2021-09-03 RX ORDER — FEXOFENADINE HCL 30 MG
1 TABLET ORAL
Qty: 0 | Refills: 0 | DISCHARGE

## 2021-09-03 RX ORDER — URSODIOL 250 MG/1
1 TABLET, FILM COATED ORAL
Qty: 0 | Refills: 0 | DISCHARGE

## 2021-09-03 RX ORDER — POLYETHYLENE GLYCOL 3350 17 G/17G
17 POWDER, FOR SOLUTION ORAL
Qty: 0 | Refills: 0 | DISCHARGE

## 2021-09-03 RX ORDER — FLUTICASONE PROPIONATE 50 MCG
1 SPRAY, SUSPENSION NASAL
Qty: 0 | Refills: 0 | DISCHARGE

## 2021-09-03 RX ORDER — GABAPENTIN 400 MG/1
1 CAPSULE ORAL
Qty: 0 | Refills: 0 | DISCHARGE

## 2021-09-03 RX ORDER — INSULIN ASPART 100 [IU]/ML
0 INJECTION, SOLUTION SUBCUTANEOUS
Qty: 0 | Refills: 0 | DISCHARGE

## 2021-09-03 RX ORDER — ONDANSETRON 8 MG/1
1 TABLET, FILM COATED ORAL
Qty: 0 | Refills: 0 | DISCHARGE

## 2021-09-03 RX ORDER — ASCORBIC ACID 60 MG
1 TABLET,CHEWABLE ORAL
Qty: 0 | Refills: 0 | DISCHARGE

## 2021-09-03 RX ORDER — LIPASE/PROTEASE/AMYLASE 16-48-48K
1 CAPSULE,DELAYED RELEASE (ENTERIC COATED) ORAL
Qty: 0 | Refills: 0 | DISCHARGE

## 2021-10-04 ENCOUNTER — RX RENEWAL (OUTPATIENT)
Age: 21
End: 2021-10-04

## 2021-10-21 PROBLEM — E11.9 TYPE 2 DIABETES MELLITUS WITHOUT COMPLICATIONS: Chronic | Status: ACTIVE | Noted: 2021-04-14

## 2021-10-21 PROBLEM — F84.0 AUTISTIC DISORDER: Chronic | Status: ACTIVE | Noted: 2021-04-14

## 2021-10-21 PROBLEM — K86.1 OTHER CHRONIC PANCREATITIS: Chronic | Status: ACTIVE | Noted: 2021-04-14

## 2021-10-29 ENCOUNTER — APPOINTMENT (OUTPATIENT)
Dept: FAMILY MEDICINE | Facility: CLINIC | Age: 21
End: 2021-10-29
Payer: MEDICARE

## 2021-10-29 ENCOUNTER — MED ADMIN CHARGE (OUTPATIENT)
Age: 21
End: 2021-10-29

## 2021-10-29 PROCEDURE — 90686 IIV4 VACC NO PRSV 0.5 ML IM: CPT

## 2021-10-29 PROCEDURE — 90471 IMMUNIZATION ADMIN: CPT

## 2021-10-29 PROCEDURE — G0008: CPT | Mod: 59

## 2021-10-29 PROCEDURE — 90715 TDAP VACCINE 7 YRS/> IM: CPT | Mod: GY

## 2021-11-02 ENCOUNTER — RX RENEWAL (OUTPATIENT)
Age: 21
End: 2021-11-02

## 2021-11-08 ENCOUNTER — RESULT CHARGE (OUTPATIENT)
Age: 21
End: 2021-11-08

## 2021-11-08 ENCOUNTER — APPOINTMENT (OUTPATIENT)
Dept: FAMILY MEDICINE | Facility: CLINIC | Age: 21
End: 2021-11-08
Payer: MEDICARE

## 2021-11-08 VITALS
BODY MASS INDEX: 27.94 KG/M2 | HEIGHT: 61 IN | OXYGEN SATURATION: 98 % | DIASTOLIC BLOOD PRESSURE: 78 MMHG | WEIGHT: 148 LBS | TEMPERATURE: 96.9 F | HEART RATE: 82 BPM | SYSTOLIC BLOOD PRESSURE: 120 MMHG

## 2021-11-08 LAB
BILIRUB UR QL STRIP: NEGATIVE
GLUCOSE UR-MCNC: 1000
HCG UR QL: 0.2 EU/DL
HGB UR QL STRIP.AUTO: NEGATIVE
KETONES UR-MCNC: NEGATIVE
LEUKOCYTE ESTERASE UR QL STRIP: NORMAL
NITRITE UR QL STRIP: NEGATIVE
PH UR STRIP: 5.5
PROT UR STRIP-MCNC: NEGATIVE
SP GR UR STRIP: 1.03

## 2021-11-08 PROCEDURE — 99212 OFFICE O/P EST SF 10 MIN: CPT | Mod: 25

## 2021-11-08 PROCEDURE — 81003 URINALYSIS AUTO W/O SCOPE: CPT | Mod: QW

## 2021-11-08 RX ORDER — AMOXICILLIN AND CLAVULANATE POTASSIUM 875; 125 MG/1; MG/1
875-125 TABLET, COATED ORAL
Qty: 20 | Refills: 0 | Status: DISCONTINUED | COMMUNITY
Start: 2021-07-25

## 2021-11-08 NOTE — HISTORY OF PRESENT ILLNESS
[Formal Caregiver] : formal caregiver [FreeTextEntry8] : Patient is here complaining of possible UTI. She has been experiencing urinary frequency, dysuria , and lower abdominal fullness. She  has tried to increase hydration ,with no relief or improvement of symptoms\par

## 2021-11-08 NOTE — PLAN
[FreeTextEntry1] : Urine analysis is abnormal today , will send urine for culture and start antibiotics. Advised to hydrate well and urinate frequently\par Pts mother requested D-Mannose and Cranberry supplements along with Pyridium for dysuria

## 2021-11-08 NOTE — PHYSICAL EXAM
[Normal] : normal rate, regular rhythm, normal S1 and S2 and no murmur heard [de-identified] : mild suprapubic tenderness to palpation

## 2021-11-10 ENCOUNTER — NON-APPOINTMENT (OUTPATIENT)
Age: 21
End: 2021-11-10

## 2021-11-10 LAB — BACTERIA UR CULT: NORMAL

## 2021-11-19 ENCOUNTER — APPOINTMENT (OUTPATIENT)
Dept: FAMILY MEDICINE | Facility: CLINIC | Age: 21
End: 2021-11-19
Payer: MEDICARE

## 2021-11-19 ENCOUNTER — RESULT CHARGE (OUTPATIENT)
Age: 21
End: 2021-11-19

## 2021-11-19 VITALS
OXYGEN SATURATION: 98 % | HEIGHT: 61 IN | WEIGHT: 148 LBS | SYSTOLIC BLOOD PRESSURE: 110 MMHG | BODY MASS INDEX: 27.94 KG/M2 | HEART RATE: 105 BPM | TEMPERATURE: 97.7 F | DIASTOLIC BLOOD PRESSURE: 60 MMHG

## 2021-11-19 LAB
BILIRUB UR QL STRIP: NEGATIVE
GLUCOSE UR-MCNC: 500
HCG UR QL: 0.2 EU/DL
HGB UR QL STRIP.AUTO: NORMAL
KETONES UR-MCNC: 15
LEUKOCYTE ESTERASE UR QL STRIP: NORMAL
NITRITE UR QL STRIP: NEGATIVE
PH UR STRIP: 7
PROT UR STRIP-MCNC: NEGATIVE
SP GR UR STRIP: 1.02

## 2021-11-19 PROCEDURE — 36415 COLL VENOUS BLD VENIPUNCTURE: CPT

## 2021-11-19 PROCEDURE — 99214 OFFICE O/P EST MOD 30 MIN: CPT | Mod: 25

## 2021-11-19 PROCEDURE — 81003 URINALYSIS AUTO W/O SCOPE: CPT | Mod: QW

## 2021-11-19 RX ORDER — QUETIAPINE 400 MG/1
TABLET, FILM COATED ORAL
Refills: 0 | Status: COMPLETED | COMMUNITY
End: 2021-11-19

## 2021-11-19 RX ORDER — PHENAZOPYRIDINE HYDROCHLORIDE 200 MG/1
200 TABLET ORAL 3 TIMES DAILY
Qty: 6 | Refills: 2 | Status: COMPLETED | COMMUNITY
Start: 2021-11-08 | End: 2021-11-19

## 2021-11-19 RX ORDER — RANITIDINE HYDROCHLORIDE 150 MG/1
150 TABLET, FILM COATED ORAL
Refills: 0 | Status: COMPLETED | COMMUNITY
End: 2021-11-19

## 2021-11-19 RX ORDER — FLUTICASONE PROPIONATE 50 UG/1
50 SPRAY, METERED NASAL
Refills: 0 | Status: COMPLETED | COMMUNITY
End: 2021-11-19

## 2021-11-19 RX ORDER — NITROFURANTOIN (MONOHYDRATE/MACROCRYSTALS) 25; 75 MG/1; MG/1
100 CAPSULE ORAL
Qty: 10 | Refills: 0 | Status: DISCONTINUED | COMMUNITY
Start: 2021-11-08 | End: 2021-11-19

## 2021-11-19 RX ORDER — GABAPENTIN 100 MG/1
100 CAPSULE ORAL
Qty: 180 | Refills: 0 | Status: COMPLETED | COMMUNITY
Start: 2020-12-07 | End: 2021-11-19

## 2021-11-19 RX ORDER — MULTIVITAMIN
CAPSULE ORAL
Refills: 0 | Status: COMPLETED | COMMUNITY
End: 2021-11-19

## 2021-11-19 RX ORDER — BACILLUS COAGULANS/INULIN 1B-250 MG
CAPSULE ORAL
Refills: 0 | Status: COMPLETED | COMMUNITY
End: 2021-11-19

## 2021-11-19 RX ORDER — NORETHINDRONE ACETATE AND ETHINYL ESTRADIOL AND FERROUS FUMARATE 1.5-30(21)
KIT ORAL
Refills: 0 | Status: COMPLETED | COMMUNITY
End: 2021-11-19

## 2021-11-19 NOTE — ASSESSMENT
[FreeTextEntry1] : urinary frequency&dysuria/ reported vaginal irritation however refused examination of site\par u/a with leuk will manage possible UTI with Nitro as patient tolerated well and reported sx resolution with antibiotics use\par also recommended co-treatment with OTC Monistat for possibility of vulvovaginal candidiasis a/w elevated BG levels as patient reports BG values have been elevated\par counseled on conservative mgmt and UTI prevention\par encouraged to notify office if worsening/persistent symptoms or new onset complaints/concerns, in the event of any emergency or life threatening condition, go to the nearest emergency department and then notify office. \par patient verbalized understanding and agrees with plan of care. \par \par poorly controlled DM\par reports 357 was patients recent FS treated with 7.1 units insulin\par patient and care taker report they are in constant contact with endocrinology and specialist is monitoring glucose values- NP advised that with elevated sugar noted on urine and BG value >250 +ketones in urine- may need further adjustment to insulin today patient of endocrinology Dr. Veronica Hernandez and agrees to follow up with endocrinology regarding NP concerns.\par o/w next apt. Jan27, 2021.\par counseled patient and discussed at length on the importance of maintaining control of blood glucose/ diabetes mgmt, advised patient to resume control of her diet however in a healthy way by choose appropriate food options based on the ones that are recommended by the dietitian. patient made aware of the complications associated with poorly controlled DMT2 including/not limited to organ damage/ DKA/ etc. encouraged to notify office if worsening/persistent symptoms or new onset complaints/concerns, in the event of any emergency or life threatening condition, go to the nearest emergency department and then notify office. \par patient verbalized understanding and agrees with plan of care. \par \par elevated LFTs on previous check\par recheck today - "labs drawn in office" \par \par preventative health\par annual wellness- 07/23/2021\par prevnar-  at next visit \par ophthalmology dilated eye exam- needs\par GYN for Pap- needs\par continued follow up with PCP for chronic concerns and preventative health management. \par patient verbalized understanding and agrees with plan of care. \par

## 2021-11-19 NOTE — HISTORY OF PRESENT ILLNESS
[FreeTextEntry8] : this is a 22yo p/w UTI sx; pmhx ADD, autism, seizure dx/ pancreatic divisum/ elevated BMI and hepatic steatosis/ poorly controlled T2DM patient binge eats despite have dietitian and endocrinologies recommendations and health plan, support staff follows and advises plan of care at group home, however she is sneaking snacks and non-compliant with her diabetes mgmt. patient reports she feels losing control over her decision making in group home triggers her to be impulsive with eating and making poor food choices- high fat/chol/sugar content. \par today patient p/w urine frequency/ dysuria, recurrent as she completed her mgmt for UTI previously Rx Nitrofurantoin by Pa, although her urine cx was negative at that time?? she does report in the interim her sx did improve on antibiotic and now recur.\par o/w in no acute distress, no other major concerns and reports feeling well. \par will evaluate and manage as appropriate.

## 2021-11-19 NOTE — PHYSICAL EXAM
[No Acute Distress] : no acute distress [Well Nourished] : well nourished [Well Developed] : well developed [No Respiratory Distress] : no respiratory distress  [No Accessory Muscle Use] : no accessory muscle use [Clear to Auscultation] : lungs were clear to auscultation bilaterally [No Edema] : there was no peripheral edema [Normal] : soft, non-tender, non-distended, no masses palpated, no HSM and normal bowel sounds [Normal Posterior Cervical Nodes] : no posterior cervical lymphadenopathy [Normal Anterior Cervical Nodes] : no anterior cervical lymphadenopathy [No CVA Tenderness] : no CVA  tenderness [No Rash] : no rash [Coordination Grossly Intact] : coordination grossly intact [Normal Gait] : normal gait [Normal Affect] : the affect was normal [Normal Insight/Judgement] : insight and judgment were intact [de-identified] : refused genitourinary exam, no erythema noted of suprapubic area, patient reports vagina feels irritated/uncomfortable

## 2021-11-19 NOTE — REVIEW OF SYSTEMS
[Dysuria] : dysuria [Incontinence] : no incontinence [Nocturia] : no nocturia [Poor Libido] : libido not poor [Hematuria] : no hematuria [Frequency] : frequency [Vaginal Discharge] : no vaginal discharge [Dysmenorrhea] : no dysmenorrhea [Skin Rash] : no skin rash [Negative] : Heme/Lymph

## 2021-11-19 NOTE — COUNSELING
[Fall prevention counseling provided] : Fall prevention counseling provided [Behavioral health counseling provided] : Behavioral health counseling provided [Sleep ___ hours/day] : Sleep [unfilled] hours/day [Engage in a relaxing activity] : Engage in a relaxing activity [Plan in advance] : Plan in advance [de-identified] : vaginal infection/UTI prevention: after passing urine, females should wipe front to back. If sexually active, pass urine before and after sexual intercourse to keep bacteria from entering into the bladder. Drink plenty of water unless you need to limit fluids for any reason; cranberry juice can also be helpful. avoid use of tightly fitted clothing, avoid scented sprays/bath products, refrain from douching, maintain proper diet/sleep/exercise, do not sit in dirty/sweaty/wet clothing, keep the vagina clean and dry. ask you provider before using OTC medications that may effect vaginal health. \par If prescribed antibiotic- take as prescribed for full course and return to office for urine recheck as directed; if you have been instructed to do so. \par notify/return to office if worsening/persistent symptoms or new onset complaints/concerns (especially worsening symptoms, unresolved fevers, lower back pain), in the event of any emergency or life threatening condition, go to the nearest emergency department and then notify office. \par \par Foot care: check feet daily and wash with mild soap and lukewarm water. \par Use lotions (as directed by podiatrist).\par File/clip toe nails after washing (as directed by podiatrist).\par Do not tear calluses. \par Use clean socks with well-fitted shoes.\par Do not go bare foot and do not cross legs. \par you should follow-up with a podiatrist yearly for maintenance of your feet.\par \par Eye care- you should follow-up with an ophthalmologist/optometrist yearly to screen for retinal damage and other complications that may occur secondary to diabetes. \par Bring the paperwork provided to you today to your visit to have the doctor complete and have the paperwork returned to our office.\par \par For more information you can visit www.medlineplus.gov  \par   [None] : None [Good understanding] : Patient has a good understanding of lifestyle changes and steps needed to achieve self management goal

## 2021-11-19 NOTE — HEALTH RISK ASSESSMENT
[] : No [No] : In the past 12 months have you used drugs other than those required for medical reasons? No [No falls in past year] : Patient reported no falls in the past year [0] : 2) Feeling down, depressed, or hopeless: Not at all (0) [PHQ-2 Negative - No further assessment needed] : PHQ-2 Negative - No further assessment needed [[id number]] : 0

## 2021-11-22 ENCOUNTER — NON-APPOINTMENT (OUTPATIENT)
Age: 21
End: 2021-11-22

## 2021-11-22 LAB
ALBUMIN SERPL ELPH-MCNC: 4.4 G/DL
ALP BLD-CCNC: 102 U/L
ALT SERPL-CCNC: 36 U/L
ANION GAP SERPL CALC-SCNC: 17 MMOL/L
AST SERPL-CCNC: 23 U/L
BILIRUB SERPL-MCNC: <0.2 MG/DL
BUN SERPL-MCNC: 24 MG/DL
CALCIUM SERPL-MCNC: 9.7 MG/DL
CHLORIDE SERPL-SCNC: 102 MMOL/L
CO2 SERPL-SCNC: 20 MMOL/L
CREAT SERPL-MCNC: 0.76 MG/DL
GLUCOSE SERPL-MCNC: 150 MG/DL
POTASSIUM SERPL-SCNC: 4 MMOL/L
PROT SERPL-MCNC: 7.3 G/DL
SODIUM SERPL-SCNC: 138 MMOL/L

## 2021-11-24 LAB — BACTERIA UR CULT: NORMAL

## 2021-12-30 ENCOUNTER — RX RENEWAL (OUTPATIENT)
Age: 21
End: 2021-12-30

## 2022-02-01 NOTE — PROGRESS NOTE ADULT - NSICDXPROBLEMPLAN3_GEN_ALL_CORE_FT
- LFTs elevated but now downtrending   - RUQ US: hepatic steatosis, no gallstones  - Will trend  - GI consulted- Dr Carrasco follow up c/o pain Left arm x 1 month getting worse no trauma/injury

## 2022-02-07 ENCOUNTER — APPOINTMENT (OUTPATIENT)
Dept: FAMILY MEDICINE | Facility: CLINIC | Age: 22
End: 2022-02-07

## 2022-02-17 ENCOUNTER — APPOINTMENT (OUTPATIENT)
Dept: FAMILY MEDICINE | Facility: CLINIC | Age: 22
End: 2022-02-17
Payer: COMMERCIAL

## 2022-02-17 VITALS
DIASTOLIC BLOOD PRESSURE: 72 MMHG | HEIGHT: 61 IN | SYSTOLIC BLOOD PRESSURE: 120 MMHG | TEMPERATURE: 98.4 F | WEIGHT: 151 LBS | OXYGEN SATURATION: 98 % | HEART RATE: 72 BPM | BODY MASS INDEX: 28.51 KG/M2

## 2022-02-17 DIAGNOSIS — Z87.898 PERSONAL HISTORY OF OTHER SPECIFIED CONDITIONS: ICD-10-CM

## 2022-02-17 DIAGNOSIS — F84.0 AUTISTIC DISORDER: ICD-10-CM

## 2022-02-17 DIAGNOSIS — N39.0 URINARY TRACT INFECTION, SITE NOT SPECIFIED: ICD-10-CM

## 2022-02-17 DIAGNOSIS — K76.0 FATTY (CHANGE OF) LIVER, NOT ELSEWHERE CLASSIFIED: ICD-10-CM

## 2022-02-17 DIAGNOSIS — J30.9 ALLERGIC RHINITIS, UNSPECIFIED: ICD-10-CM

## 2022-02-17 DIAGNOSIS — R39.9 UNSPECIFIED SYMPTOMS AND SIGNS INVOLVING THE GENITOURINARY SYSTEM: ICD-10-CM

## 2022-02-17 DIAGNOSIS — E78.2 MIXED HYPERLIPIDEMIA: ICD-10-CM

## 2022-02-17 DIAGNOSIS — R82.998 OTHER ABNORMAL FINDINGS IN URINE: ICD-10-CM

## 2022-02-17 DIAGNOSIS — Z87.19 PERSONAL HISTORY OF OTHER DISEASES OF THE DIGESTIVE SYSTEM: ICD-10-CM

## 2022-02-17 DIAGNOSIS — Z00.00 ENCOUNTER FOR GENERAL ADULT MEDICAL EXAMINATION W/OUT ABNORMAL FINDINGS: ICD-10-CM

## 2022-02-17 PROCEDURE — 99214 OFFICE O/P EST MOD 30 MIN: CPT

## 2022-02-17 RX ORDER — FLUTICASONE PROPIONATE 50 UG/1
50 SPRAY, METERED NASAL DAILY
Qty: 1 | Refills: 6 | Status: COMPLETED | COMMUNITY
End: 2022-02-17

## 2022-02-17 RX ORDER — NITROFURANTOIN (MONOHYDRATE/MACROCRYSTALS) 25; 75 MG/1; MG/1
100 CAPSULE ORAL
Qty: 10 | Refills: 0 | Status: COMPLETED | COMMUNITY
Start: 2021-11-19 | End: 2022-02-17

## 2022-02-17 NOTE — HEALTH RISK ASSESSMENT
[Never] : Never [No] : In the past 12 months have you used drugs other than those required for medical reasons? No [No falls in past year] : Patient reported no falls in the past year [0] : 2) Feeling down, depressed, or hopeless: Not at all (0) [PHQ-2 Negative - No further assessment needed] : PHQ-2 Negative - No further assessment needed [Patient/Caregiver not ready to engage] : , patient/caregiver not ready to engage [de-identified] : limited- counseled on this [de-identified] : facility promotes diabetic appropriate diet, patient has difficulty with maintaining compliance  [[id number]] : 0 [AdvancecareDate] : 02/22

## 2022-02-17 NOTE — ASSESSMENT
[FreeTextEntry1] : poorly controlled DM/ continues with impulsive eating\par reports FS continue in high 200s-300s so she had recently followed up with endocrinology Dr. Veronica Hernandez, labs were repeated, insulins were adjusted- will obtain reports.\par refuses a1c/ FS/ urine check today \par counseled patient and discussed at length on the importance of maintaining control of blood glucose/ diabetes mgmt, advised patient to resume control of her diet however in a healthy way by choose appropriate food options based on the ones that are recommended by the dietitian. patient made aware of the complications associated with poorly controlled DMT2 including/not limited to organ damage/ DKA/ etc. encouraged to notify office if worsening/persistent symptoms or new onset complaints/concerns, in the event of any emergency or life threatening condition, go to the nearest emergency department and then notify office. \par referral behavioral counseling for further evaluation and mgmt impulsive eating \par patient verbalized understanding and agrees with plan of care. \par \par hld\par advised lifestyle modification\par recheck of fasting liped/ cmp, RTO for BW fasting\par in no acute distress and o/w offers no complaints \par \par hx hepatic steatosis/ pancreatic insufficiency w recurrent pancreatitis/ hx splenic vein thrombosis/ chronic constipation\par patient of Dr. Ireland, follows up every 3-6 months\par obtaining most recent reports \par in no acute distress and o/w offers no complaints \par requesting recheck pancreatic enzymes \par \par medication renewal provided as requested \par PW completed and scanned into chart \par \par preventative health\par annual wellness- 07/23/2021\par prevnar-  deferred today\par ophthalmology dilated eye exam- needs\par GYN for Pap- needs to establish care with GYN \par continued follow up with PCP for chronic concerns and preventative health management. \par patient verbalized understanding and agrees with plan of care. \par

## 2022-02-17 NOTE — HISTORY OF PRESENT ILLNESS
[FreeTextEntry1] : here for f/u chronic concerns [de-identified] : this is a 23yo pmhx poorly controlled DMT2 under mgmt Dr. Hernandez/ hld/ ADD, autism, seizure disorder/ pancreatic divisum/ elevated BMI/ hepatic steatosis, here for f/u, p/w post nasal drip, denies currently taking her Allegra, she is using Flonase daily.\par remains non-compliant with diabetic consistent carb diet, continues to sneak snacks reports will take unhealthy snacks from others around group home, although she does not have access to unhealthy food items and o/w follows diabetic appropriate meal plan. she reports exercises infrequently, about 2 days/week, 30min on exercise trampoline. \par patient continues to impulsively eat unhealthy snacks, she reports she does not know why it is that she does this however she understands that she is not making a healthy choice when she sneaks chips, etc. however continues to do this. counseled patient on this and advised would trial counseling to examine this further. \par o/w in no acute distress, no other major concerns and reports feeling well. \par will evaluate and manage as appropriate. \par

## 2022-02-17 NOTE — PHYSICAL EXAM
[No Acute Distress] : no acute distress [Well Nourished] : well nourished [Well Developed] : well developed [No Respiratory Distress] : no respiratory distress  [No Accessory Muscle Use] : no accessory muscle use [Clear to Auscultation] : lungs were clear to auscultation bilaterally [Normal] : normal rate, regular rhythm, normal S1 and S2 and no murmur heard [Normal Posterior Cervical Nodes] : no posterior cervical lymphadenopathy [Normal Anterior Cervical Nodes] : no anterior cervical lymphadenopathy [No CVA Tenderness] : no CVA  tenderness [No Rash] : no rash [Coordination Grossly Intact] : coordination grossly intact [Normal Gait] : normal gait [Normal Affect] : the affect was normal [Normal Insight/Judgement] : insight and judgment were intact [de-identified] : +PND advised start prn Allegra for AR, continue Fluticasone

## 2022-02-17 NOTE — COUNSELING
[Fall prevention counseling provided] : Fall prevention counseling provided [Behavioral health counseling provided] : Behavioral health counseling provided [Sleep ___ hours/day] : Sleep [unfilled] hours/day [Engage in a relaxing activity] : Engage in a relaxing activity [Plan in advance] : Plan in advance [None] : None [Good understanding] : Patient has a good understanding of lifestyle changes and steps needed to achieve self management goal [de-identified] : vaginal infection/UTI prevention: after passing urine, females should wipe front to back. If sexually active, pass urine before and after sexual intercourse to keep bacteria from entering into the bladder. Drink plenty of water unless you need to limit fluids for any reason; cranberry juice can also be helpful. avoid use of tightly fitted clothing, avoid scented sprays/bath products, refrain from douching, maintain proper diet/sleep/exercise, do not sit in dirty/sweaty/wet clothing, keep the vagina clean and dry. ask you provider before using OTC medications that may effect vaginal health. \par If prescribed antibiotic- take as prescribed for full course and return to office for urine recheck as directed; if you have been instructed to do so. \par notify/return to office if worsening/persistent symptoms or new onset complaints/concerns (especially worsening symptoms, unresolved fevers, lower back pain), in the event of any emergency or life threatening condition, go to the nearest emergency department and then notify office. \par \par Foot care: check feet daily and wash with mild soap and lukewarm water. \par Use lotions (as directed by podiatrist).\par File/clip toe nails after washing (as directed by podiatrist).\par Do not tear calluses. \par Use clean socks with well-fitted shoes.\par Do not go bare foot and do not cross legs. \par you should follow-up with a podiatrist yearly for maintenance of your feet.\par \par Eye care- you should follow-up with an ophthalmologist/optometrist yearly to screen for retinal damage and other complications that may occur secondary to diabetes. \par Bring the paperwork provided to you today to your visit to have the doctor complete and have the paperwork returned to our office.\par \par For more information you can visit www.medlineplus.gov  \par  \par Maintain balanced diet of whole grain, fruits and vegetables, lean meats, skinless poultry, fish, beans, soy products, eggs, nuts, and low fat dairy as per FDA dietary guidelines. \par Avoid high calorie/low nutrient foods. \par vegetables/fruits- at least 5 servings/day, \par whole grains- 100% whole grain and at least 3 grams fiber/day.\par reduce/eliminate saturated fat- less than 5% on nutrition labels (ex. tidwell, deli meat, hot dogs, fried foods, cookies, ice cream, chips, soft drinks, etc.)\par stay within your FDA recommended daily calorie needs or use the plate method to portion intake. \par Avoid fast food and limit eating out. When eating out choose healthy alternatives (ex. grilled, baked, steamed. low fat dressings. avoid french fries).\par DO NOT CONSUME FOODS YOU ARE ALLERGIC TO DESPITE DIETARY RECOMMENDATIONS.\par \par For more information you can visit www.Health.gov \par \par Incorporate regular physical activity most days of the week. \par Regular aerobic activity- moderate intensity, most days/wk, at least 30min/day- ex. brisk walk 2.5miles/hr, ballroom dancing, water aerobics, light yard work (raking/lawn mowing) actively playing basketball, biking 10miles/hr.\par Muscle strengthening and strength/resistance exercises 2-3days/wk- ex. free weights, resistance bands, your own weight (squats/pull-ups/push-ups).\par Neuro-motor exercises for balance/agility/coordination and flexibility exercises 2-3days/wk, 20-30min/day- ex. yoga and marco-chi.\par Bone strengthening at least 30min/day, most days of the week- ex. weights, resistance bands, running, brisk walking, jumping rope, stair climbing, tennis.\par Decrease sedentary time. \par LISTEN TO YOUR BODY AND MODIFY ACTIVITY AS NEEDED- DO NOT OVEREXERT YOURSELF DURING PHYSICAL ACTIVITY DESPITE RECOMMENDATION.\par \par For more information you can visit www.Health.gov

## 2022-03-18 ENCOUNTER — APPOINTMENT (OUTPATIENT)
Dept: FAMILY MEDICINE | Facility: CLINIC | Age: 22
End: 2022-03-18

## 2022-03-19 NOTE — FAMILY HISTORY
[FreeTextEntry1] : A three-generation pedigree was obtained.  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 Her mother was diagnosed with a non-malignant brain tumor at age 44 and cervical cancer at age 50, current age 52. Her maternal grandfather passed away at age 52 from early onset Alzheimer’s disease and possible cardiac arrest. She also has a maternal cousin (mother’s sister’s son) with an ASD diagnosis.  \par See scanned pedigree for further information.  \par  Med Rec - Admission

## 2022-04-20 LAB
ALBUMIN SERPL ELPH-MCNC: 4.2 G/DL
ALP BLD-CCNC: 91 U/L
ALT SERPL-CCNC: 14 U/L
AMYLASE/CREAT SERPL: 29 U/L
ANION GAP SERPL CALC-SCNC: 13 MMOL/L
AST SERPL-CCNC: 12 U/L
BILIRUB SERPL-MCNC: 0.2 MG/DL
BUN SERPL-MCNC: 20 MG/DL
CALCIUM SERPL-MCNC: 9.6 MG/DL
CHLORIDE SERPL-SCNC: 104 MMOL/L
CHOLEST SERPL-MCNC: 185 MG/DL
CO2 SERPL-SCNC: 22 MMOL/L
CREAT SERPL-MCNC: 0.98 MG/DL
EGFR: 84 ML/MIN/1.73M2
GLUCOSE SERPL-MCNC: 172 MG/DL
HDLC SERPL-MCNC: 41 MG/DL
LDLC SERPL CALC-MCNC: 97 MG/DL
LPL SERPL-CCNC: 8 U/L
NONHDLC SERPL-MCNC: 144 MG/DL
POTASSIUM SERPL-SCNC: 4.3 MMOL/L
PROT SERPL-MCNC: 7.1 G/DL
SODIUM SERPL-SCNC: 139 MMOL/L
TRIGL SERPL-MCNC: 235 MG/DL

## 2022-04-25 ENCOUNTER — NON-APPOINTMENT (OUTPATIENT)
Age: 22
End: 2022-04-25

## 2022-05-11 ENCOUNTER — FORM ENCOUNTER (OUTPATIENT)
Age: 22
End: 2022-05-11

## 2022-05-18 NOTE — PROGRESS NOTE PEDS - PSYCHIATRIC
Lexiscan administered, pt reports chest is getting really warm and tight. 10:34 Pt reports chest doesn't feel as tight. 10:36 Pt reports \"not quit back to feeling normal\" Pt reports tight across chest a 2 out of 10.  10:39 Pt reports she feels back to normal. This part of test completed. Pt given snack and beverage.
Spoke with Dr. Funmilayo Barrios before start of [place] concerning BP and HR, verbal given to continue with testing.
see HPI
details

## 2022-07-25 ENCOUNTER — APPOINTMENT (OUTPATIENT)
Dept: FAMILY MEDICINE | Facility: CLINIC | Age: 22
End: 2022-07-25

## 2022-07-25 VITALS
HEART RATE: 73 BPM | HEIGHT: 61 IN | SYSTOLIC BLOOD PRESSURE: 100 MMHG | WEIGHT: 153 LBS | BODY MASS INDEX: 28.89 KG/M2 | OXYGEN SATURATION: 99 % | DIASTOLIC BLOOD PRESSURE: 60 MMHG | TEMPERATURE: 96.8 F

## 2022-07-25 DIAGNOSIS — T78.1XXA OTHER ADVERSE FOOD REACTIONS, NOT ELSEWHERE CLASSIFIED, INITIAL ENCOUNTER: ICD-10-CM

## 2022-07-25 PROCEDURE — 99395 PREV VISIT EST AGE 18-39: CPT

## 2022-07-25 RX ORDER — INSULIN ASPART 100 [IU]/ML
100 INJECTION, SOLUTION INTRAVENOUS; SUBCUTANEOUS
Qty: 20 | Refills: 0 | Status: ACTIVE | COMMUNITY
Start: 2022-05-11

## 2022-07-25 RX ORDER — PNV NO.95/FERROUS FUM/FOLIC AC 28MG-0.8MG
100 TABLET ORAL DAILY
Qty: 90 | Refills: 3 | Status: DISCONTINUED | COMMUNITY
End: 2022-07-25

## 2022-07-25 RX ORDER — MULTIVIT-MIN/IRON/FOLIC ACID/K 18-600-40
500 CAPSULE ORAL
Qty: 90 | Refills: 0 | Status: DISCONTINUED | COMMUNITY
End: 2022-07-25

## 2022-07-25 RX ORDER — FEXOFENADINE HCL 60 MG
CAPSULE ORAL
Refills: 0 | Status: DISCONTINUED | COMMUNITY
End: 2022-07-25

## 2022-07-25 RX ORDER — FEXOFENADINE HCL 180 MG/1
180 TABLET ORAL
Qty: 30 | Refills: 0 | Status: ACTIVE | COMMUNITY
Start: 2021-03-16

## 2022-07-25 RX ORDER — URSODIOL 300 MG/1
300 CAPSULE ORAL
Qty: 60 | Refills: 0 | Status: DISCONTINUED | COMMUNITY
Start: 2020-11-11 | End: 2022-07-25

## 2022-07-25 NOTE — ASSESSMENT
[FreeTextEntry1] : REGI PANDA is a 22 year old female here for a physical exam.  She is also here to follow up on medical issues as noted above.\par \par Patient has a history of chronic pancreatitis,  diabetes type 2 (?1) uncontrolled, polycystic ovarian syndrome, and vitamin D deficiency.

## 2022-07-25 NOTE — PLAN
[FreeTextEntry1] : Continue all medications as prescribed. She had recent labs with Endo so we deferred on labs today (had CMP, lipids, A1c, vit D, urine microalbumin). Disc that checking CBC would be reasonable to do but no urgency and can be done next time she has labs (they can let us know when labs are being ordered by Endo and we can send Rx to do CBC along with Endo labs). I asked Angela to have her endo send labs/consult notes. \par \par Reviewed age-appropriate preventive screening tests with patient. She is UTD on gyn exam.\par \par Cont close f/u with Endo and GI as recommended by them. 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 Reassurance offered that this is distinctly different than anaphylaxis and so I don't anticipate her having that issue ever although we disc that if her reaction gets more severe over time with any throat/chest sxs she should let us know. Revd usual food triggers (eg stone fruits, apples) for oral allergy syndrome and that she can see allergist for eval and testing if wants to try to confirm this, and revd that cooked fruits tend to be able to be tolerated even if the raw versions are not\par \par Discussed clean eating (e.g. Mediterranean style diet) and recommendations for regular exercise/staying as physically active as possible.\par \par Reviewed importance of good self care (e.g. meditation, yoga, adequate rest, regular exercise, magnesium, clean eating, etc.).\par \par Recommended she get A1C to goal/near to goal before considering pursuing tattoo as she is at higher risk for wound infection if DM is poorly controlled. Also she is asking if she could occas have a glass of alcohol and we disc that she should get A1C to goal/near to goal first and if can do so then it would be reasonable to have occas ETOH'ic beverage. \par \par Follow up for next physical in one year.

## 2022-07-25 NOTE — PHYSICAL EXAM
[No Acute Distress] : no acute distress [Well Developed] : well developed [Well-Appearing] : well-appearing [Normal Sclera/Conjunctiva] : normal sclera/conjunctiva [EOMI] : extraocular movements intact [Normal Outer Ear/Nose] : the outer ears and nose were normal in appearance [No JVD] : no jugular venous distention [No Lymphadenopathy] : no lymphadenopathy [Supple] : supple [Thyroid Normal, No Nodules] : the thyroid was normal and there were no nodules present [No Respiratory Distress] : no respiratory distress  [No Accessory Muscle Use] : no accessory muscle use [Clear to Auscultation] : lungs were clear to auscultation bilaterally [Normal Rate] : normal rate  [Normal S1, S2] : normal S1 and S2 [Regular Rhythm] : with a regular rhythm [No Murmur] : no murmur heard [No Carotid Bruits] : no carotid bruits [No Varicosities] : no varicosities [Pedal Pulses Present] : the pedal pulses are present [No Edema] : there was no peripheral edema [No Extremity Clubbing/Cyanosis] : no extremity clubbing/cyanosis [Soft] : abdomen soft [Non Tender] : non-tender [Non-distended] : non-distended [No Masses] : no abdominal mass palpated [No HSM] : no HSM [Normal Bowel Sounds] : normal bowel sounds [Normal Posterior Cervical Nodes] : no posterior cervical lymphadenopathy [Normal Anterior Cervical Nodes] : no anterior cervical lymphadenopathy [No Joint Swelling] : no joint swelling [Grossly Normal Strength/Tone] : grossly normal strength/tone [No Rash] : no rash [Coordination Grossly Intact] : coordination grossly intact [No Focal Deficits] : no focal deficits [Normal Gait] : normal gait [Normal Affect] : the affect was normal [Normal Insight/Judgement] : insight and judgment were intact [de-identified] : overweight

## 2022-07-25 NOTE — HISTORY OF PRESENT ILLNESS
[Other: _____] : [unfilled] [de-identified] : Her last physical exam was last year\par \par Vaccines:\par Tetanus is up to date, Tdap 10/2021\par COVID vaccine is up to date\par \par Her last dentist visit was one plus year ago and will sched for near future\par Her last eye doctor appointment was less than one year ago\par \par GYN visit is up to date (last 12/2021; wnl), Dr. Paz--> Jennifer Walter at [facility].\par \par Her diet is moderately healthy but she is a stress eater and when in a negative frame of mind/stressed mood she tends to eat unhealthy foods/sneak foods (so is working with nutritionist and therapist to try to help her achieve healthier eating patterns)\par Exercise: walking 1-2 times a week and is going to work to increase freq\par \par Misty has Type 2 (?1) diabetes which is insulin dependent and poorly controlled. 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 Hernandez for endo mgmt. but Dr. Hernandez went on maternity leave several months ago and is not returning to the practice. Saw NP Jenny and had CMP, A1C, Lipid, vit D, urine microalbumin,. No med changes made yet pending labs and will be having repeat labs in 11/2022\par \par She sees Dr. Ireland for GI f/u of pancreas/GI issues. She is not sure whan she last saw GI but was within past year\par \par Also sees Pain mgmt for gabapentin which helps her chronic pain. ?name of specialist. Ursodiol was d/c'ed\par \par Had reaction to eating a peach recently where lips were swollen, itchy, irritated, tingly. SXs resolved on their own after a few hours. No tongue or post OP involvement. No throat edema or anaphylaxis\par \par Misty is considering getting a tattoo and would like to know if ok to have occasional alcoholic drink. We disc she should get her A1C to target range first and if she can do that then would be safe to get tattoo (am concerned about risk for infection with tattoo if does whileDM is not well controlled) and have occas ETOH in moderation if she chooses. \par

## 2022-07-25 NOTE — HEALTH RISK ASSESSMENT
[0] : 2) Feeling down, depressed, or hopeless: Not at all (0) [PHQ-2 Negative - No further assessment needed] : PHQ-2 Negative - No further assessment needed [[id number]] : 0

## 2022-07-25 NOTE — REVIEW OF SYSTEMS
[Fatigue] : fatigue [Recent Change In Weight] : ~T recent weight change [Negative] : Heme/Lymph [Fever] : no fever [Chills] : no chills

## 2022-09-16 NOTE — PROGRESS NOTE ADULT - ASSESSMENT
Sheath #1: Closed using R-Band. Radial band pressure set at: 12. acute on chronic pancreatitis       npo  iv fluid  pain control  Adv diet to clears  dc planning once tolerating po  d/w mother at over phone  patient with recent eus at [facility]  being eval for pancreatectomy and islet cell transplant at [place]    Advanced care planning was discussed with patient and family.  Advanced care planning forms were reviewed and discussed.  Risks, benefits and alternatives of gastroenterologic procedures were discussed in detail and all questions were answered.    30 minutes spent.

## 2022-10-12 ENCOUNTER — FORM ENCOUNTER (OUTPATIENT)
Age: 22
End: 2022-10-12

## 2022-10-13 ENCOUNTER — FORM ENCOUNTER (OUTPATIENT)
Age: 22
End: 2022-10-13

## 2022-10-18 ENCOUNTER — FORM ENCOUNTER (OUTPATIENT)
Age: 22
End: 2022-10-18

## 2022-10-19 RX ORDER — POLYETHYLENE GLYCOL 3350 17 G/17G
17 POWDER, FOR SOLUTION ORAL DAILY
Qty: 1 | Refills: 3 | Status: ACTIVE | COMMUNITY
Start: 2021-07-06 | End: 1900-01-01

## 2022-12-22 ENCOUNTER — APPOINTMENT (OUTPATIENT)
Dept: FAMILY MEDICINE | Facility: CLINIC | Age: 22
End: 2022-12-22
Payer: MEDICARE

## 2022-12-22 VITALS
OXYGEN SATURATION: 99 % | TEMPERATURE: 97 F | HEART RATE: 80 BPM | SYSTOLIC BLOOD PRESSURE: 110 MMHG | DIASTOLIC BLOOD PRESSURE: 62 MMHG
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
## 2022-12-22 PROCEDURE — 99213 OFFICE O/P EST LOW 20 MIN: CPT
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
## 2022-12-22 NOTE — PHYSICAL EXAM
[Normal Outer Ear/Nose] : the outer ears and nose were normal in appearance [No JVD] : no jugular venous distention [Normal] : normal rate, regular rhythm, normal S1 and S2 and no murmur heard [No Edema] : there was no peripheral edema [Coordination Grossly Intact] : coordination grossly intact [No Focal Deficits] : no focal deficits [Normal Gait] : normal gait [Normal Affect] : the affect was normal [Normal Insight/Judgement] : insight and judgment were intact [de-identified] : healing bite marks on the left forearm without surrounding erythema, open skin, bleeding, or drainage; radial pulse 2+ and strong, full ROM of the elbow/wrist joints.
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
## 2022-12-22 NOTE — PLAN
[FreeTextEntry1] : See assessment.\par Paperwork completed for group home.\par Group home representative in attendance today.

## 2023-01-18 DIAGNOSIS — Z23 ENCOUNTER FOR IMMUNIZATION: ICD-10-CM

## 2023-03-10 NOTE — ED ADULT NURSE NOTE - IS THE PATIENT ABLE TO BE SCREENED?
Detail Level: Detailed Wound Evaluated By (Optional): Suyapa Mcdaniels MD Wound Diameter In Cm(Optional): 0 Wound Crusting?: clean Sutures?: partially intact Wound Color?: red Wound Dehiscence?: dehiscence Wound Granulation?: early Yes

## 2023-04-06 RX ORDER — MULTIVITAMIN WITH FOLIC ACID 400 MCG
TABLET ORAL DAILY
Qty: 90 | Refills: 3 | Status: ACTIVE | COMMUNITY
Start: 2022-03-15 | End: 1900-01-01

## 2023-04-06 RX ORDER — PANTOTHENIC ACID (VIT B5) 250 MG
500 TABLET ORAL
Qty: 90 | Refills: 3 | Status: ACTIVE | COMMUNITY
Start: 2022-07-11 | End: 1900-01-01

## 2023-04-08 RX ORDER — FAMOTIDINE 40 MG/1
40 TABLET, FILM COATED ORAL DAILY
Qty: 90 | Refills: 3 | Status: ACTIVE | COMMUNITY
Start: 2021-03-16 | End: 1900-01-01

## 2023-05-24 ENCOUNTER — APPOINTMENT (OUTPATIENT)
Dept: FAMILY MEDICINE | Facility: CLINIC | Age: 23
End: 2023-05-24
Payer: MEDICARE

## 2023-05-24 VITALS
SYSTOLIC BLOOD PRESSURE: 118 MMHG | HEART RATE: 120 BPM | WEIGHT: 153 LBS | OXYGEN SATURATION: 98 % | HEIGHT: 61 IN | BODY MASS INDEX: 28.89 KG/M2 | DIASTOLIC BLOOD PRESSURE: 72 MMHG | TEMPERATURE: 98.9 F

## 2023-05-24 DIAGNOSIS — M10.9 GOUT, UNSPECIFIED: ICD-10-CM

## 2023-05-24 DIAGNOSIS — M79.675 PAIN IN LEFT TOE(S): ICD-10-CM

## 2023-05-24 PROCEDURE — 36415 COLL VENOUS BLD VENIPUNCTURE: CPT

## 2023-05-24 PROCEDURE — 99213 OFFICE O/P EST LOW 20 MIN: CPT | Mod: 25

## 2023-05-24 NOTE — PLAN
[FreeTextEntry1] : Check labs today (CBC, ESR, CRP, uric acid). D/c Indomethacin as I feel possible risks outweigh benefits meghna as I am not convinced she really has gout. Avoid barefoot walking. Restart yoga. Activity as tolerated. Can refer to ortho and/or rheum if needed for signif abnormal labs or incr/new worsening sxs. \par \par Continue all other medications as prescribed. \par \par Reviewed diabetes treatment plan including Mediterranean style/more plant based/clean eating, pair proteins with carbs for better glycemic control, regular exercise (strength training as well as cardio exercise, and try to do brisk walking after larger meals for better glycemic control), work to achieve/maintain a health weight, need for annual ophthalmology exam and good foot care (self care or with podiatrist if needed), Goal LDL <100, goal A1c <=7%, BP goal <=130-135/80-85 on average. Most patients with diabetes should be on an ACE-I or ARB med for renal protection, and a statin medication for primary CV risk reduction. I defer to her endo team re med recommendations for mgmt of her Type 1 DM\par \par Cont close f/u with Endo and GI as recommended by them. \par \par Schedule CPE visit for in/after 7/2023

## 2023-05-24 NOTE — HISTORY OF PRESENT ILLNESS
[Other: _____] : [unfilled] [FreeTextEntry1] : REGI PANDA is a 23 year old female here for a follow up visit.\par  [de-identified] : \meenu Jay has Type 2 (?1) diabetes which is insulin dependent and poorly controlled. 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 She sees Dr. Ireland for GI f/u of pancreas/GI issues. Also sees Pain mgmt for gabapentin which helps her chronic pain. ?name of specialist. \par \meenu Jay is here for [facility] visit as she started to have L great toe pain last week. Only medial edge of toe is painful, not the entire toe. It was never red or warm to touch. She was always able to get shoes on.  Hurts more if standing or walking for a long time but still able to get through her day and not signif limiting her. On 5/19/23 went to [facility] and was told she had gout and was given Indomethacin TID x 5 days. Seems to be helping with pain at times but still with intermittent pain . Had XR L foot at  but no labs done. No other joint issues at this time\par \meenu Is here as wonders if needs to continue taking Indomethacin and if so needs a RF on med\par \par I spoke with JOAQUINA Bautista  from group home as she was questioning gout diagnosis, as am I. We will check labs today \par \meenu Jay has been doing some walking but no longer does yoga. Diet is not great and she admits she drinks a lot of diet coke and does not always drink a lot of water but is trying to be better about that. No ETOH use\par

## 2023-05-24 NOTE — ASSESSMENT
[FreeTextEntry1] : REGI PANDA is a 23 year old female here for follow up on medical issues as noted above. SHe had recent UC visit for left great toe pain, possible acute gout \par \par I am questioning gout diagnosis as sxs are somewhat atypical and never involved entire great toe and there was never red/hot warm s/sxs synovitis. \par \par Disc Checking labs to try to better assess whether or not is gout and Regi is willing to do so. Revd/recommended increased water intake, avoid diet drinks/artificial sweeteners, do more stretching/strengthening of feet (eg try adding yoga back in), wear shoes with good arch/foot support, don't walk barefoot etc and monitor sxs. Let me know if incr/new sxs or if sxs not improving with these measures and we can have her see ortho for eval. If labs suggestive of gout will consider rheum eval though I don't think that will be necessary at this time\par

## 2023-05-24 NOTE — REVIEW OF SYSTEMS
[Negative] : Heme/Lymph [Joint Pain] : joint pain [Joint Stiffness] : joint stiffness [FreeTextEntry9] : L great toe pain along medial edge of toe

## 2023-05-24 NOTE — PHYSICAL EXAM
[No Acute Distress] : no acute distress [Well Developed] : well developed [Well-Appearing] : well-appearing [Normal Sclera/Conjunctiva] : normal sclera/conjunctiva [EOMI] : extraocular movements intact [Normal Outer Ear/Nose] : the outer ears and nose were normal in appearance [No JVD] : no jugular venous distention [No Lymphadenopathy] : no lymphadenopathy [Supple] : supple [Thyroid Normal, No Nodules] : the thyroid was normal and there were no nodules present [No Respiratory Distress] : no respiratory distress  [No Accessory Muscle Use] : no accessory muscle use [Clear to Auscultation] : lungs were clear to auscultation bilaterally [Normal Rate] : normal rate  [Regular Rhythm] : with a regular rhythm [Normal S1, S2] : normal S1 and S2 [No Murmur] : no murmur heard [No Carotid Bruits] : no carotid bruits [No Varicosities] : no varicosities [Pedal Pulses Present] : the pedal pulses are present [No Edema] : there was no peripheral edema [No Extremity Clubbing/Cyanosis] : no extremity clubbing/cyanosis [Soft] : abdomen soft [Non Tender] : non-tender [Non-distended] : non-distended [No Masses] : no abdominal mass palpated [No HSM] : no HSM [Normal Bowel Sounds] : normal bowel sounds [No Joint Swelling] : no joint swelling [Grossly Normal Strength/Tone] : grossly normal strength/tone [No Rash] : no rash [Coordination Grossly Intact] : coordination grossly intact [No Focal Deficits] : no focal deficits [Normal Gait] : normal gait [Normal Affect] : the affect was normal [Normal Insight/Judgement] : insight and judgment were intact [de-identified] : overweight but wgt stable since last visit  [de-identified] : L foot in general and L great toe in particular show no erythema or excessive warmth or skin changes. 2+ B rad and 2+ L DP pulse. 5/5/ strength of L foot and toes and is able to wiggle toes without pain

## 2023-05-24 NOTE — HEALTH RISK ASSESSMENT
[0] : 2) Feeling down, depressed, or hopeless: Not at all (0) [PHQ-2 Negative - No further assessment needed] : PHQ-2 Negative - No further assessment needed [Never] : Never [[id number]] : 0

## 2023-05-25 ENCOUNTER — NON-APPOINTMENT (OUTPATIENT)
Age: 23
End: 2023-05-25

## 2023-05-25 LAB
ANION GAP SERPL CALC-SCNC: 16 MMOL/L
BUN SERPL-MCNC: 12 MG/DL
CALCIUM SERPL-MCNC: 9.9 MG/DL
CHLORIDE SERPL-SCNC: 102 MMOL/L
CO2 SERPL-SCNC: 21 MMOL/L
CREAT SERPL-MCNC: 0.55 MG/DL
CRP SERPL-MCNC: 13 MG/L
EGFR: 132 ML/MIN/1.73M2
ERYTHROCYTE [SEDIMENTATION RATE] IN BLOOD BY WESTERGREN METHOD: 29 MM/HR
GLUCOSE SERPL-MCNC: 310 MG/DL
POTASSIUM SERPL-SCNC: 4 MMOL/L
SODIUM SERPL-SCNC: 138 MMOL/L
URATE SERPL-MCNC: 3.9 MG/DL

## 2023-06-05 ENCOUNTER — APPOINTMENT (OUTPATIENT)
Dept: FAMILY MEDICINE | Facility: CLINIC | Age: 23
End: 2023-06-05

## 2023-07-28 ENCOUNTER — APPOINTMENT (OUTPATIENT)
Dept: FAMILY MEDICINE | Facility: CLINIC | Age: 23
End: 2023-07-28
Payer: MEDICARE

## 2023-07-28 VITALS
SYSTOLIC BLOOD PRESSURE: 112 MMHG | DIASTOLIC BLOOD PRESSURE: 64 MMHG | HEIGHT: 61 IN | HEART RATE: 85 BPM | WEIGHT: 168 LBS | OXYGEN SATURATION: 98 % | TEMPERATURE: 98.4 F | BODY MASS INDEX: 31.72 KG/M2

## 2023-07-28 DIAGNOSIS — Z00.00 ENCOUNTER FOR GENERAL ADULT MEDICAL EXAMINATION W/OUT ABNORMAL FINDINGS: ICD-10-CM

## 2023-07-28 PROCEDURE — G0438: CPT

## 2023-07-28 PROCEDURE — 99213 OFFICE O/P EST LOW 20 MIN: CPT | Mod: 25

## 2023-07-28 PROCEDURE — G0402 INITIAL PREVENTIVE EXAM: CPT

## 2023-07-28 RX ORDER — GABAPENTIN 300 MG/1
300 CAPSULE ORAL
Qty: 270 | Refills: 3 | Status: ACTIVE | COMMUNITY
Start: 2021-04-21 | End: 1900-01-01

## 2023-07-28 NOTE — PHYSICAL EXAM
[No Acute Distress] : no acute distress [Well Developed] : well developed [Well-Appearing] : well-appearing [Normal Sclera/Conjunctiva] : normal sclera/conjunctiva [EOMI] : extraocular movements intact [Normal Outer Ear/Nose] : the outer ears and nose were normal in appearance [Normal Oropharynx] : the oropharynx was normal [No JVD] : no jugular venous distention [No Lymphadenopathy] : no lymphadenopathy [Supple] : supple [Thyroid Normal, No Nodules] : the thyroid was normal and there were no nodules present [No Respiratory Distress] : no respiratory distress  [No Accessory Muscle Use] : no accessory muscle use [Clear to Auscultation] : lungs were clear to auscultation bilaterally [Normal Rate] : normal rate  [Regular Rhythm] : with a regular rhythm [Normal S1, S2] : normal S1 and S2 [No Murmur] : no murmur heard [No Carotid Bruits] : no carotid bruits [No Varicosities] : no varicosities [Pedal Pulses Present] : the pedal pulses are present [No Edema] : there was no peripheral edema [No Extremity Clubbing/Cyanosis] : no extremity clubbing/cyanosis [Soft] : abdomen soft [Non Tender] : non-tender [Non-distended] : non-distended [No Masses] : no abdominal mass palpated [No HSM] : no HSM [Normal Bowel Sounds] : normal bowel sounds [Normal Posterior Cervical Nodes] : no posterior cervical lymphadenopathy [Normal Anterior Cervical Nodes] : no anterior cervical lymphadenopathy [No Joint Swelling] : no joint swelling [Grossly Normal Strength/Tone] : grossly normal strength/tone [No Rash] : no rash [Coordination Grossly Intact] : coordination grossly intact [No Focal Deficits] : no focal deficits [Normal Gait] : normal gait [Normal Affect] : the affect was normal [Normal Insight/Judgement] : insight and judgment were intact [de-identified] : BMI 31 and wgt increased by 15# since 5/2023 visit  [de-identified] : L foot in general and L great toe in particular show no erythema or excessive warmth or skin changes. 2+ B rad and 2+ L DP pulse. 5/5/ strength of L foot and toes and is able to wiggle toes without pain

## 2023-07-28 NOTE — PLAN
[FreeTextEntry1] : Continue all medications as prescribed. She is not sure when she last had full labs so I gave lab slip and she will check to see if UTD on endo labs and if not done in past 3-4 months she will get labs in near future.  Check labs as above. Will adjust any medications based upon lab results.\par \par I RF Gabapentin for her. She will cont TID dosing for now as when tries lower dose she has incr epigastric pain and current dose 300 mg TID controls pain to 1/10 level that is manageable. \par \par Reviewed age-appropriate preventive screening tests with patient. She is UTD on gyn exam she states\par \par Revd/recommended PCV 20 (due to her DM); she will consider and d/w her specialists \par \par Reviewed diabetes treatment plan including Mediterranean style/more plant based/clean eating, pair proteins with carbs for better glycemic control, regular exercise (strength training as well as cardio exercise, and try to do brisk walking after larger meals for better glycemic control), work to achieve/maintain a health weight, need for annual ophthalmology exam and good foot care (self care or with podiatrist if needed), Goal LDL <100, goal A1c <=7%, BP goal <=130-135/80-85 on average. Most patients with diabetes should be on an ACE-I or ARB med for renal protection, and a statin medication for primary CV risk reduction. \par \par Discussed clean eating (eg Mediterranean style eating plan) and regular exercise/staying as physically active as possible.  Include balance exercises and strength training and core strengthening exercises for bone health and to decrease risk for falls.\par \par Reviewed importance of good self care (e.g. meditation, yoga, adequate rest, regular exercise, magnesium, clean eating, etc.).\par \par Continue follow up with her specialists as recommended by them. Will request notes/most recent labs from Endo ([facility]) as no recent info recd. I asked Misty to have her specialists send correspondence/lab results when she sees them so I am in the information loop re her treatment plan. \par \par Annual Paperwork for her group home completed today. \par \par Follow up for next physical in 1-3 years. Schedule RPA visit (f/u multiple chronic medical issues) in about 6 months.\par \par Additional time spent addressing new or existing problems, requiring additional work outside of the normal scope of a routine annual exam: 20 minutes.\par

## 2023-07-28 NOTE — HEALTH RISK ASSESSMENT
[0] : 1) Little interest or pleasure doing things: Not at all (0) [PHQ-2 Negative - No further assessment needed] : PHQ-2 Negative - No further assessment needed [Never] : Never [1] : 2) Feeling down, depressed, or hopeless for several days (1) [[id number]] : 1

## 2023-07-28 NOTE — HISTORY OF PRESENT ILLNESS
[FreeTextEntry1] : REGI PANDA is a 23 year old female here for a physical exam.\par  [de-identified] : Her last physical exam was last year\par \par Vaccines:\par Tetanus is up to date, Tdap 10/2021\par Pneumococcal vaccination is NOT up to date, had 2 doses of PCV 7 as a baby\par COVID vaccine is up to date\par \par Her last dentist visit was less than one year ago\par Her last eye doctor appointment was less than one year ago\par Her last dermatologist visit was less than one year ago\par \par GYN visit is up to date, Early 2023, Jennifer Walter at [facility]\par \par Her diet is not very healthful freq snacking/WF carbs/sweets\par Exercise: walking occas, just joined local gym and is going to try to get there at least a few times a week\par \par Misty has Type 2 (?1) diabetes which is insulin dependent and poorly controlled. SHe also has chronic pain related to her chronic pancreatitis. She used to be seen by pain mgmt (?name) and they advised her no need to cont regular pain mgmt f/u and she could ask PMD for RF Gabapentin. 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 Darling ([facility]) and has labs checked periodically there but she is not sure today when she last saw them or had labs and I have not recd any recent info . She sees Dr. Ireland for GI f/u of pancreas/GI issues. Also has seen in the past Pain mgmt for gabapentin which helps her chronic pain. ?name of specialist.\par \par Since her last visit here she now has a CGM device and is working/learning to do more accurate carb counting when eating and to try to make healthier food choices. \par \par Has gained wgt due to late night snacking and not being active enough. Just joined gym and is trying to snack less at night. \par \par Saw Dr. LIDIA Ireland a few weeks ago and all was stable from pancreatic standpoint. She will be seeing [facility] with consultation with Dr. Yancy Hathaway (pancreatic surgeon). She is anticipating doing pancreatic surgery in near future to correct her pancreas divisum anatomic abnormality

## 2023-07-28 NOTE — ASSESSMENT
[FreeTextEntry1] : REGI PANDA is a 23 year old female here for a physical exam.  She is also here to follow up on medical issues as noted above.\par

## 2023-08-11 RX ORDER — MECOBALAMIN 1000 MCG
1000 TABLET,DISINTEGRATING SUBLINGUAL DAILY
Qty: 90 | Refills: 3 | Status: ACTIVE | COMMUNITY
Start: 2022-07-11 | End: 1900-01-01

## 2023-08-14 RX ORDER — MULTIVITAMIN
TABLET ORAL DAILY
Refills: 0 | Status: DISCONTINUED | COMMUNITY

## 2023-08-14 RX ORDER — MULTIVIT-MIN/IRON FUM/FOLIC AC 7.5 MG-4
TABLET ORAL DAILY
Qty: 90 | Refills: 3 | Status: DISCONTINUED | COMMUNITY
End: 2023-08-14

## 2023-08-21 DIAGNOSIS — R79.89 OTHER SPECIFIED ABNORMAL FINDINGS OF BLOOD CHEMISTRY: ICD-10-CM

## 2023-08-25 PROBLEM — R79.89 ELEVATED LFTS: Status: ACTIVE | Noted: 2021-11-19

## 2023-08-27 ENCOUNTER — NON-APPOINTMENT (OUTPATIENT)
Age: 23
End: 2023-08-27

## 2023-10-06 ENCOUNTER — APPOINTMENT (OUTPATIENT)
Dept: FAMILY MEDICINE | Facility: CLINIC | Age: 23
End: 2023-10-06
Payer: MEDICARE

## 2023-10-06 VITALS
OXYGEN SATURATION: 98 % | HEIGHT: 61 IN | DIASTOLIC BLOOD PRESSURE: 72 MMHG | SYSTOLIC BLOOD PRESSURE: 118 MMHG | BODY MASS INDEX: 32.85 KG/M2 | TEMPERATURE: 97 F | WEIGHT: 174 LBS | HEART RATE: 95 BPM

## 2023-10-06 DIAGNOSIS — L29.9 PRURITUS, UNSPECIFIED: ICD-10-CM

## 2023-10-06 DIAGNOSIS — G89.29 OTHER CHRONIC PAIN: ICD-10-CM

## 2023-10-06 DIAGNOSIS — R63.2 POLYPHAGIA: ICD-10-CM

## 2023-10-06 PROCEDURE — 90686 IIV4 VACC NO PRSV 0.5 ML IM: CPT

## 2023-10-06 PROCEDURE — G0008: CPT

## 2023-10-06 PROCEDURE — 99214 OFFICE O/P EST MOD 30 MIN: CPT | Mod: 25

## 2023-10-06 RX ORDER — IBUPROFEN 200 MG/1
200 TABLET ORAL
Qty: 90 | Refills: 3 | Status: ACTIVE | COMMUNITY
Start: 2023-10-06 | End: 1900-01-01

## 2023-10-06 RX ORDER — FEXOFENADINE HYDROCHLORIDE 180 MG/1
180 TABLET ORAL
Qty: 90 | Refills: 3 | Status: ACTIVE | COMMUNITY
Start: 2023-10-06 | End: 1900-01-01

## 2023-10-12 ENCOUNTER — MED ADMIN CHARGE (OUTPATIENT)
Age: 23
End: 2023-10-12

## 2023-10-12 ENCOUNTER — APPOINTMENT (OUTPATIENT)
Dept: FAMILY MEDICINE | Facility: CLINIC | Age: 23
End: 2023-10-12
Payer: MEDICARE

## 2023-10-12 PROCEDURE — 90620 MENB-4C VACCINE IM: CPT | Mod: GY

## 2023-10-12 PROCEDURE — 90472 IMMUNIZATION ADMIN EACH ADD: CPT

## 2023-10-12 PROCEDURE — 90677 PCV20 VACCINE IM: CPT

## 2023-10-12 PROCEDURE — G0009: CPT

## 2023-10-13 NOTE — ED PEDIATRIC NURSE REASSESSMENT NOTE - CONDITION
improved
Siliq Counseling:  I discussed with the patient the risks of Siliq including but not limited to new or worsening depression, suicidal thoughts and behavior, immunosuppression, malignancy, posterior leukoencephalopathy syndrome, and serious infections.  The patient understands that monitoring is required including a PPD at baseline and must alert us or the primary physician if symptoms of infection or other concerning signs are noted. There is also a special program designed to monitor depression which is required with Siliq.

## 2023-10-19 ENCOUNTER — APPOINTMENT (OUTPATIENT)
Dept: FAMILY MEDICINE | Facility: CLINIC | Age: 23
End: 2023-10-19
Payer: MEDICARE

## 2023-10-19 ENCOUNTER — LABORATORY RESULT (OUTPATIENT)
Age: 23
End: 2023-10-19

## 2023-10-19 PROCEDURE — 36415 COLL VENOUS BLD VENIPUNCTURE: CPT

## 2023-10-24 LAB
ALBUMIN SERPL ELPH-MCNC: 4.3 G/DL
ALP BLD-CCNC: 103 U/L
ALT SERPL-CCNC: 55 U/L
ANION GAP SERPL CALC-SCNC: 13 MMOL/L
AST SERPL-CCNC: 43 U/L
BASOPHILS # BLD AUTO: 0.15 K/UL
BASOPHILS NFR BLD AUTO: 2.6 %
BILIRUB SERPL-MCNC: 0.3 MG/DL
BUN SERPL-MCNC: 12 MG/DL
CALCIUM SERPL-MCNC: 9.4 MG/DL
CHLORIDE SERPL-SCNC: 104 MMOL/L
CHOLEST SERPL-MCNC: 191 MG/DL
CO2 SERPL-SCNC: 22 MMOL/L
CREAT SERPL-MCNC: 0.64 MG/DL
CREAT SPEC-SCNC: 54 MG/DL
EGFR: 127 ML/MIN/1.73M2
EOSINOPHIL # BLD AUTO: 0.14 K/UL
EOSINOPHIL NFR BLD AUTO: 2.5 %
ESTIMATED AVERAGE GLUCOSE: 174 MG/DL
FERRITIN SERPL-MCNC: 193 NG/ML
GLUCOSE SERPL-MCNC: 114 MG/DL
HBA1C MFR BLD HPLC: 7.7 %
HCT VFR BLD CALC: 43.3 %
HDLC SERPL-MCNC: 41 MG/DL
HGB BLD-MCNC: 13.4 G/DL
IRON SATN MFR SERPL: 29 %
IRON SERPL-MCNC: 91 UG/DL
LDLC SERPL CALC-MCNC: 112 MG/DL
LYMPHOCYTES # BLD AUTO: 1.88 K/UL
LYMPHOCYTES NFR BLD AUTO: 32.5 %
MAN DIFF?: NORMAL
MCHC RBC-ENTMCNC: 29.9 PG
MCHC RBC-ENTMCNC: 30.9 GM/DL
MCV RBC AUTO: 96.7 FL
MICROALBUMIN 24H UR DL<=1MG/L-MCNC: <1.2 MG/DL
MICROALBUMIN/CREAT 24H UR-RTO: NORMAL MG/G
MONOCYTES # BLD AUTO: 0.39 K/UL
MONOCYTES NFR BLD AUTO: 6.8 %
NEUTROPHILS # BLD AUTO: 3.06 K/UL
NEUTROPHILS NFR BLD AUTO: 53 %
NONHDLC SERPL-MCNC: 150 MG/DL
PLATELET # BLD AUTO: 260 K/UL
POTASSIUM SERPL-SCNC: 4.4 MMOL/L
PROT SERPL-MCNC: 7.5 G/DL
RBC # BLD: 4.48 M/UL
RBC # FLD: 12.8 %
SODIUM SERPL-SCNC: 140 MMOL/L
TIBC SERPL-MCNC: 318 UG/DL
TRIGL SERPL-MCNC: 221 MG/DL
UIBC SERPL-MCNC: 227 UG/DL
WBC # FLD AUTO: 5.78 K/UL

## 2023-10-27 ENCOUNTER — APPOINTMENT (OUTPATIENT)
Dept: FAMILY MEDICINE | Facility: CLINIC | Age: 23
End: 2023-10-27
Payer: MEDICARE

## 2023-10-27 ENCOUNTER — NON-APPOINTMENT (OUTPATIENT)
Age: 23
End: 2023-10-27

## 2023-10-27 VITALS
WEIGHT: 167 LBS | DIASTOLIC BLOOD PRESSURE: 62 MMHG | HEART RATE: 92 BPM | OXYGEN SATURATION: 98 % | SYSTOLIC BLOOD PRESSURE: 102 MMHG | TEMPERATURE: 97 F | BODY MASS INDEX: 31.53 KG/M2 | HEIGHT: 61 IN

## 2023-10-27 DIAGNOSIS — K86.1 OTHER CHRONIC PANCREATITIS: ICD-10-CM

## 2023-10-27 DIAGNOSIS — Z01.818 ENCOUNTER FOR OTHER PREPROCEDURAL EXAMINATION: ICD-10-CM

## 2023-10-27 PROCEDURE — 93000 ELECTROCARDIOGRAM COMPLETE: CPT

## 2023-10-27 PROCEDURE — 99214 OFFICE O/P EST MOD 30 MIN: CPT | Mod: 25

## 2023-11-14 NOTE — ED STATDOCS - GASTROINTESTINAL, MLM
abdomen soft, and non-distended. Bowel sounds present. +mild epigastric tenderness Complex Repair And Skin Substitute Graft Text: The defect edges were debeveled with a #15 scalpel blade.  The primary defect was closed partially with a complex linear closure.  Given the location of the remaining defect, shape of the defect and the proximity to free margins a skin substitute graft was deemed most appropriate to repair the remaining defect.  The graft was trimmed to fit the size of the remaining defect.  The graft was then placed in the primary defect, oriented appropriately, and sutured into place.

## 2023-12-06 ENCOUNTER — APPOINTMENT (OUTPATIENT)
Dept: FAMILY MEDICINE | Facility: CLINIC | Age: 23
End: 2023-12-06
Payer: MEDICARE

## 2023-12-06 VITALS
HEIGHT: 61 IN | SYSTOLIC BLOOD PRESSURE: 108 MMHG | TEMPERATURE: 97 F | WEIGHT: 161 LBS | BODY MASS INDEX: 30.4 KG/M2 | DIASTOLIC BLOOD PRESSURE: 72 MMHG

## 2023-12-06 DIAGNOSIS — Z79.4 TYPE 2 DIABETES MELLITUS WITH HYPERGLYCEMIA: ICD-10-CM

## 2023-12-06 DIAGNOSIS — E11.65 TYPE 2 DIABETES MELLITUS WITH HYPERGLYCEMIA: ICD-10-CM

## 2023-12-06 DIAGNOSIS — R14.1 GAS PAIN: ICD-10-CM

## 2023-12-06 PROCEDURE — 90620 MENB-4C VACCINE IM: CPT | Mod: GY

## 2023-12-06 PROCEDURE — 90471 IMMUNIZATION ADMIN: CPT

## 2023-12-06 PROCEDURE — 99214 OFFICE O/P EST MOD 30 MIN: CPT | Mod: 25

## 2023-12-06 RX ORDER — SIMETHICONE 125 MG
125 CAPSULE ORAL
Qty: 120 | Refills: 11 | Status: ACTIVE | COMMUNITY
Start: 2023-12-06 | End: 1900-01-01

## 2023-12-06 RX ORDER — MULTIVITAMIN/IRON/FOLIC ACID 18MG-0.4MG
TABLET ORAL DAILY
Qty: 90 | Refills: 3 | Status: ACTIVE | COMMUNITY
Start: 2023-08-14 | End: 1900-01-01

## 2023-12-13 NOTE — ED PROVIDER NOTE - QUALITY
Pt. Is a/o x 4.  Pt. Is ambulatory and in no distress at the time of discharge.  Pt. Understands and verbalizes discharge instructions.   aching

## 2024-01-29 ENCOUNTER — APPOINTMENT (OUTPATIENT)
Dept: FAMILY MEDICINE | Facility: CLINIC | Age: 24
End: 2024-01-29
Payer: MEDICARE

## 2024-01-29 DIAGNOSIS — E55.9 VITAMIN D DEFICIENCY, UNSPECIFIED: ICD-10-CM

## 2024-01-29 DIAGNOSIS — F32.9 MAJOR DEPRESSIVE DISORDER, SINGLE EPISODE, UNSPECIFIED: ICD-10-CM

## 2024-01-29 NOTE — HISTORY OF PRESENT ILLNESS
[FreeTextEntry1] : REGI PANDA is a 23 year old female here for a follow up visit. [de-identified] : Misty has a history of insulin dependent diabetes. 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 Since her pancreas was nonfunctional she was advised to have a pancreatectomy.  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 She had preop vaccines due to the splenectomy, specifically influenza and meningitis B.  She got her second meningitis B vaccine at a postop visit in 12/2023. She had previously had MenACWY as a teen and Hib as a child.   She is here today for routine follow up.

## 2024-01-29 NOTE — PLAN
[FreeTextEntry1] : Labs?  Continue all medications as prescribed.   Continue to follow up with all specialists as scheduled.  Reviewed age-appropriate preventive screening tests with patient. At this point she is up to date with all vaccines. She does not need Hib unless her surgeon feels that she does.  Discussed clean eating (eg Mediterranean style eating plan) and regular exercise/staying as physically active as possible.  Include balance exercises and strength training and core strengthening exercises for bone health and to decrease risk for falls.  Reviewed importance of good self care (e.g. meditation, yoga, adequate rest, regular exercise, magnesium, clean eating, etc.).  Follow up for next physical in 7/2024 or sooner if needed.

## 2024-01-29 NOTE — PHYSICAL EXAM
[No Acute Distress] : no acute distress [No Edema] : there was no peripheral edema [Soft] : abdomen soft [Non Tender] : non-tender [Grossly Normal Strength/Tone] : grossly normal strength/tone [No Focal Deficits] : no focal deficits [Normal] : affect was normal and insight and judgment were intact

## 2024-01-29 NOTE — HEALTH RISK ASSESSMENT
[0] : 2) Feeling down, depressed, or hopeless: Not at all (0) [PHQ-2 Negative - No further assessment needed] : PHQ-2 Negative - No further assessment needed [[id number]] : 0 [Never] : Never

## 2024-02-13 NOTE — BIRTH HISTORY
FOLLOW UP IN 6 WEEKS     Long arm  cast x 6 weeks    Xrays of the affected limb in 6 weeks. XRAYS OUT OF THE CAST    No Anti inflammatory medications: ie:  Advil/ Aleve/ Mobic/ Celebrex/ Naproxen/ Ibuprofen.    Elevation of the affected Extremity 4-5 times a day for 20-30 minutes    Elevation is:   12 to 18 inches above the HEART!!     NORCO OR Tylenol as needed for pain      Cast Care     You’ve just been given a cast made of fiberglass. This cast will hold your WRIST in place to help it heal. Though it might feel a bit awkward at first, you’ll soon get used to it. During the coming days and weeks, the way you treat your cast can play a big part in how fast and how well you heal.    You may get your cast wet in water sources such as:  Pool, tub, shower or jacuzzi/hot tub.      You should not be getting it wet with water from:  Lakes, streams, ponds, oceans, etc      Handle with Care  For the best results, remember the following:    Do  Do keep the cast clean.  Cover it with plastic to protect it when around dirt.  Do use any support you are given, such as crutches or a sling.  Do elevate the cast above your heart whenever possible.   Don’t  Don’t slide anything inside the cast, even to scratch your skin.  Don’t put lotions or powders around the cast or inside it.  Don’t bang the cast.  Don’t cut the cast or pull it apart.  PLEASE WAIT 24 HOURS BEFORE WALKING ON A LEG CAST!     Call Orthopedics at [phone] for any questions or other issues.    Discussed healthy lifestyles to include Weight loss, stopping smoking, decreasing alcohol consumption,exercising as well as good dietary choices   [FreeTextEntry1] : Ms. Thomason had no illnesses or infections during pregnancy, and no exposures to drugs or alcohol.    Ultrasounds reported normal.  \par \par Misty was born to a 33 year old  mother, full term at a hospital in [place].  She weighed about 6 lbs and was discharged home after two days.   screening reported normal. Her first APGAR was slightly low, but second was normal.\par

## 2024-02-25 NOTE — PROGRESS NOTE PEDS - PROBLEM SELECTOR PLAN 1
-- Advance diet to low fat diet later today (lunch time)  -- decrease IVF to 1/2x maintenance   -- trend lipase/amylase daily  -- continue PPI IV. -- Advance diet to low fat diet later today (lunch time)  -- decrease IVF to 1/2x maintenance   -- trend lipase/amylase daily, repeat glucose  -- continue PPI IV. 25-Feb-2024 03:00
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
## 2024-03-18 RX ORDER — MULTIVIT-MIN/FOLIC/VIT K/LYCOP 400-300MCG
25 MCG TABLET ORAL DAILY
Qty: 90 | Refills: 0 | Status: ACTIVE | COMMUNITY
Start: 1900-01-01 | End: 1900-01-01

## 2024-04-03 ENCOUNTER — APPOINTMENT (OUTPATIENT)
Dept: FAMILY MEDICINE | Facility: CLINIC | Age: 24
End: 2024-04-03
Payer: MEDICARE

## 2024-04-03 VITALS
WEIGHT: 151 LBS | HEIGHT: 61 IN | BODY MASS INDEX: 28.51 KG/M2 | SYSTOLIC BLOOD PRESSURE: 108 MMHG | HEART RATE: 85 BPM | TEMPERATURE: 97.2 F | OXYGEN SATURATION: 95 % | DIASTOLIC BLOOD PRESSURE: 82 MMHG

## 2024-04-03 DIAGNOSIS — E11.9 TYPE 2 DIABETES MELLITUS W/OUT COMPLICATIONS: ICD-10-CM

## 2024-04-03 DIAGNOSIS — K86.89 OTHER SPECIFIED DISEASES OF PANCREAS: ICD-10-CM

## 2024-04-03 PROCEDURE — 99214 OFFICE O/P EST MOD 30 MIN: CPT

## 2024-04-03 NOTE — HISTORY OF PRESENT ILLNESS
[Other: _____] : [unfilled] [FreeTextEntry1] : REGI PANDA is a 24 year old female here for a follow up visit. [de-identified] : She has a history of insulin dependent diabetes, eczema, ADD, autism, depression, hyperlipidemia, PCOS, and vitamin D deficiency. 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 She sees her endocrinologist regularly and her last HgbA1c was 7.0.  Her last visit to our office was in 12/2023, approximately one month after her splenectomy and pancreatectomy. Her vaccines were updates and her medication list was reviewed. She was advised to continue all medications as prescribed, continue to follow up with her specialists, and return in 6 months for her annual physical.

## 2024-04-03 NOTE — PLAN
none
[FreeTextEntry1] : Continue all medications as prescribed.   Reviewed age-appropriate preventive screening tests with patient.  Discussed clean eating (eg Mediterranean style eating plan) and regular exercise/staying as physically active as possible.  Include balance exercises and strength training and core strengthening exercises for bone health and to decrease risk for falls.  Reviewed importance of good self care (e.g. meditation, yoga, adequate rest, regular exercise, magnesium, clean eating, etc.).  Follow up for next physical in 7/2024.

## 2024-04-03 NOTE — HEALTH RISK ASSESSMENT
[No falls in past year] : Patient reported no falls in the past year [0] : 2) Feeling down, depressed, or hopeless: Not at all (0) [PHQ-2 Negative - No further assessment needed] : PHQ-2 Negative - No further assessment needed [Never] : Never [[id number]] : 0

## 2024-04-03 NOTE — ASSESSMENT
[FreeTextEntry1] : She is here to follow up on insulin dependent diabetes, eczema, ADD, autism, depression, hyperlipidemia, PCOS, and vitamin D deficiency.  She had labs done for her GI, Dr. Ireland, last month. Her CBC was normal except for elevated platelets of 580, likely due to asplenia. Her glucose was elevated at 114 and her LFTs were elevated (AST 76, ALT 78). Her ceruloplasmin was borderline high at 48 (normal <45). The remainder of the workup for elevated LFTs was negative.  She had previously seen Dr. Ireland for evaluation of diarrhea. She tested positive for C. diff in 2/2024. This was treated with antibiotics and then she tested negative but still had diarrhea. She was prescribed Questran and Align. She only started these recently so she is not sure how well they are working.  She had blood work done for her endocrinologist recently (end of February). Will request these results.

## 2024-05-02 ENCOUNTER — APPOINTMENT (OUTPATIENT)
Dept: FAMILY MEDICINE | Facility: CLINIC | Age: 24
End: 2024-05-02
Payer: MEDICARE

## 2024-05-02 VITALS
BODY MASS INDEX: 28.51 KG/M2 | TEMPERATURE: 97.7 F | HEART RATE: 83 BPM | DIASTOLIC BLOOD PRESSURE: 62 MMHG | SYSTOLIC BLOOD PRESSURE: 122 MMHG | WEIGHT: 151 LBS | OXYGEN SATURATION: 95 % | HEIGHT: 61 IN

## 2024-05-02 DIAGNOSIS — E11.9 TYPE 2 DIABETES MELLITUS W/OUT COMPLICATIONS: ICD-10-CM

## 2024-05-02 DIAGNOSIS — Z79.4 TYPE 2 DIABETES MELLITUS W/OUT COMPLICATIONS: ICD-10-CM

## 2024-05-02 DIAGNOSIS — F84.0 AUTISTIC DISORDER: ICD-10-CM

## 2024-05-02 DIAGNOSIS — E78.5 HYPERLIPIDEMIA, UNSPECIFIED: ICD-10-CM

## 2024-05-02 DIAGNOSIS — L30.9 DERMATITIS, UNSPECIFIED: ICD-10-CM

## 2024-05-02 DIAGNOSIS — S01.01XA LACERATION W/OUT FOREIGN BODY OF SCALP, INITIAL ENCOUNTER: ICD-10-CM

## 2024-05-02 DIAGNOSIS — E28.2 POLYCYSTIC OVARIAN SYNDROME: ICD-10-CM

## 2024-05-02 DIAGNOSIS — F98.8 OTHER SPECIFIED BEHAVIORAL AND EMOTIONAL DISORDERS WITH ONSET USUALLY OCCURRING IN CHILDHOOD AND ADOLESCENCE: ICD-10-CM

## 2024-05-02 DIAGNOSIS — G40.909 EPILEPSY, UNSPECIFIED, NOT INTRACTABLE, W/OUT STATUS EPILEPTICUS: ICD-10-CM

## 2024-05-02 DIAGNOSIS — K52.9 NONINFECTIVE GASTROENTERITIS AND COLITIS, UNSPECIFIED: ICD-10-CM

## 2024-05-02 PROCEDURE — 99214 OFFICE O/P EST MOD 30 MIN: CPT

## 2024-05-02 RX ORDER — FLUTICASONE PROPIONATE 50 UG/1
50 SPRAY, METERED NASAL TWICE DAILY
Qty: 16 | Refills: 11 | Status: ACTIVE | COMMUNITY
Start: 2021-07-23 | End: 1900-01-01

## 2024-05-02 RX ORDER — SAW PALMETTO 160 MG
500 CAPSULE ORAL DAILY
Qty: 90 | Refills: 3 | Status: ACTIVE | COMMUNITY
Start: 2024-05-02 | End: 1900-01-01

## 2024-05-02 RX ORDER — CAMPHOR 0.45 %
25 GEL (GRAM) TOPICAL
Qty: 90 | Refills: 0 | Status: COMPLETED | COMMUNITY
Start: 2023-10-06 | End: 2024-05-02

## 2024-05-02 NOTE — HEALTH RISK ASSESSMENT
[0] : 2) Feeling down, depressed, or hopeless: Not at all (0) [PHQ-2 Negative - No further assessment needed] : PHQ-2 Negative - No further assessment needed [Never] : Never [[id number]] : 0
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
## 2024-05-02 NOTE — PLAN
[FreeTextEntry1] : 7 staples were removed from her scalp without difficulty. The wound remains intact.  Continue all medications as prescribed. Refills provided as requested and D/C orders given as well.  Reviewed age-appropriate preventive screening tests with patient.  Discussed clean eating (eg Mediterranean style eating plan) and regular exercise/staying as physically active as possible.  Include balance exercises and strength training and core strengthening exercises for bone health and to decrease risk for falls.  Reviewed importance of good self care (e.g. meditation, yoga, adequate rest, regular exercise, magnesium, clean eating, etc.).  Follow up for next physical in 7/2024 as scheduled.  Face-to-face time spent with patient, over half in discussion of the above diagnoses and treatment plan: 30 minutes.

## 2024-07-11 NOTE — ED PROVIDER NOTE - CHPI ED SYMPTOMS NEG
From: Justina Zavala  To: Georgi Conner MD  Sent: 9/17/2019 3:25 PM CDT  Subject: Other    Here are the photos I spoke of.  
Yes
no fever

## 2024-07-23 ENCOUNTER — APPOINTMENT (OUTPATIENT)
Dept: FAMILY MEDICINE | Facility: CLINIC | Age: 24
End: 2024-07-23
Payer: MEDICARE

## 2024-07-23 VITALS
BODY MASS INDEX: 28.13 KG/M2 | DIASTOLIC BLOOD PRESSURE: 60 MMHG | SYSTOLIC BLOOD PRESSURE: 110 MMHG | HEART RATE: 88 BPM | WEIGHT: 149 LBS | HEIGHT: 61 IN | OXYGEN SATURATION: 97 % | TEMPERATURE: 97.1 F

## 2024-07-23 DIAGNOSIS — E11.9 TYPE 2 DIABETES MELLITUS W/OUT COMPLICATIONS: ICD-10-CM

## 2024-07-23 DIAGNOSIS — F32.9 MAJOR DEPRESSIVE DISORDER, SINGLE EPISODE, UNSPECIFIED: ICD-10-CM

## 2024-07-23 DIAGNOSIS — Z00.00 ENCOUNTER FOR GENERAL ADULT MEDICAL EXAMINATION W/OUT ABNORMAL FINDINGS: ICD-10-CM

## 2024-07-23 DIAGNOSIS — F84.0 AUTISTIC DISORDER: ICD-10-CM

## 2024-07-23 DIAGNOSIS — E78.5 HYPERLIPIDEMIA, UNSPECIFIED: ICD-10-CM

## 2024-07-23 DIAGNOSIS — Z79.4 TYPE 2 DIABETES MELLITUS W/OUT COMPLICATIONS: ICD-10-CM

## 2024-07-23 DIAGNOSIS — F98.8 OTHER SPECIFIED BEHAVIORAL AND EMOTIONAL DISORDERS WITH ONSET USUALLY OCCURRING IN CHILDHOOD AND ADOLESCENCE: ICD-10-CM

## 2024-07-23 PROCEDURE — 36415 COLL VENOUS BLD VENIPUNCTURE: CPT

## 2024-07-23 PROCEDURE — G0439: CPT

## 2024-07-23 RX ORDER — TRAMADOL HYDROCHLORIDE 50 MG/1
50 TABLET, COATED ORAL
Refills: 0 | Status: ACTIVE | COMMUNITY

## 2024-07-23 RX ORDER — PANCRELIPASE LIPASE, PANCRELIPASE PROTEASE, PANCRELIPASE AMYLASE 40000; 126000; 168000 [USP'U]/1; [USP'U]/1; [USP'U]/1
40000-126000 CAPSULE, DELAYED RELEASE ORAL
Refills: 0 | Status: ACTIVE | COMMUNITY

## 2024-07-23 RX ORDER — PANTOPRAZOLE SODIUM 40 MG/1
40 GRANULE, DELAYED RELEASE ORAL
Refills: 0 | Status: ACTIVE | COMMUNITY

## 2024-07-23 NOTE — ASSESSMENT
[FreeTextEntry1] : Patient is a 25yo female with PMH IDDM, HLD, ADD, autism, depression, PCOS, Vitamin D deficiency, hx splenectomy/pancreatectomy who presents to the office for CPE.  Health Maintenance - UTD with routine HCM. - Fasting labs drawn in office. - Eat plenty of fruits and vegetables, especially deeply colored fruits/vegetables (such as leafy greens, peaches) that are more nutrient-dense.  Continue to work hard on diet and exercise, limiting/avoiding saturated fat, fatty foods, greasy foods, red meats, white flour-based carbohydrates (cookies, cakes, white bread, white rice), and added sugars.  Chose whole grain foods and products made with whole grains over refined grains and white flour-based carbohydrates.  Avoid beverages and food with added sugar.  Limit salt intake to improve blood pressure.  Limit alcohol intake. - Try and incorporate 30 minutes of aerobic exercise to your daily routine.  Pt will continue all medications as prescribed. RF provided for Vitamin B12 as requested. Continue regular f/u with Endocrinology, Dr. Modi. Continue regular f/u with GI, Dr. Ireland. Further recommendations pending b/w results.  Call the office or go to the ED immediately if you develop new, worsening or concerning symptoms including high fever, severe headache/worst headache of your life, confusion, dizziness/lightheadedness, loss of consciousness, severe chest pain, difficulty breathing, shortness of breath, severe abdominal pain, excessive vomiting/diarrhea, inability to feel/move the extremities, or any other concerning symptoms.

## 2024-07-23 NOTE — HEALTH RISK ASSESSMENT
[No] : In the past 12 months have you used drugs other than those required for medical reasons? No [No falls in past year] : Patient reported no falls in the past year [0] : 2) Feeling down, depressed, or hopeless: Not at all (0) [PHQ-2 Negative - No further assessment needed] : PHQ-2 Negative - No further assessment needed [Never] : Never [HIV test declined] : HIV test declined [Hepatitis C test declined] : Hepatitis C test declined [None] : None [Unemployed] : unemployed [Feels Safe at Home] : Feels safe at home [Fully functional (bathing, dressing, toileting, transferring, walking, feeding)] : Fully functional (bathing, dressing, toileting, transferring, walking, feeding) [Fully functional (using the telephone, shopping, preparing meals, housekeeping, doing laundry, using] : Fully functional and needs no help or supervision to perform IADLs (using the telephone, shopping, preparing meals, housekeeping, doing laundry, using transportation, managing medications and managing finances) [Audit-CScore] : 0 [de-identified] : exercises a few days a week for 30-45 minutes, walks a lot daily [de-identified] : well balanced, healthy [[id number]] : 0 [EyeExamDate] : 2023 [Patient reported PAP Smear was normal] : Patient reported PAP Smear was normal [Change in mental status noted] : No change in mental status noted [Language] : denies difficulty with language [Sexually Active] : not sexually active [High Risk Behavior] : no high risk behavior [Reports changes in hearing] : Reports no changes in hearing [Reports changes in vision] : Reports no changes in vision [Reports changes in dental health] : Reports no changes in dental health [PapSmearDate] : 2024 [de-identified] : lives in group home

## 2024-07-23 NOTE — HISTORY OF PRESENT ILLNESS
[FreeTextEntry1] : CPE. [de-identified] : Patient is a 25yo female with PMH IDDM, HLD, ADD, autism, depression, PCOS, Vitamin D deficiency, hx splenectomy/pancreatectomy who presents to the office for CPE.    Last CPE:  07/28/2023, Dr. ROSANNE Sr.  GYN Exam:  follows annually, Jennifer Walter NP ([place]).  Mammogram:  no known family history of breast cancer.  Regular breast self-exams encouraged.  Colonoscopy:  no known family history of colon cancer.  Ophthalmology:  2023. Dermatology:  UTD. Dentist:  UTD. Flu:  fall 2023. Tdap:  10/2021.  PNA:  PCV20 10/2023.  COVID:  UTD. LMP:  menstruation is irregular because of PCOS, pt is on birth control.  Endocrinology:  Dr. Modi.  Last visit ~2-3 weeks ago. GI:  Dr. Ireland.

## 2024-07-24 LAB
25(OH)D3 SERPL-MCNC: 51 NG/ML
ALBUMIN SERPL ELPH-MCNC: 4.5 G/DL
ALP BLD-CCNC: 105 U/L
ALT SERPL-CCNC: 99 U/L
ANION GAP SERPL CALC-SCNC: 15 MMOL/L
AST SERPL-CCNC: 62 U/L
BASOPHILS # BLD AUTO: 0.02 K/UL
BASOPHILS NFR BLD AUTO: 0.4 %
BILIRUB SERPL-MCNC: 0.3 MG/DL
BUN SERPL-MCNC: 18 MG/DL
CALCIUM SERPL-MCNC: 9.2 MG/DL
CHLORIDE SERPL-SCNC: 103 MMOL/L
CHOLEST SERPL-MCNC: 141 MG/DL
CO2 SERPL-SCNC: 24 MMOL/L
CREAT SERPL-MCNC: 0.6 MG/DL
EGFR: 128 ML/MIN/1.73M2
EOSINOPHIL # BLD AUTO: 0.15 K/UL
EOSINOPHIL NFR BLD AUTO: 2.7 %
ESTIMATED AVERAGE GLUCOSE: 163 MG/DL
GLUCOSE SERPL-MCNC: 74 MG/DL
HBA1C MFR BLD HPLC: 7.3 %
HCT VFR BLD CALC: 43.1 %
HDLC SERPL-MCNC: 41 MG/DL
HGB BLD-MCNC: 13.2 G/DL
IMM GRANULOCYTES NFR BLD AUTO: 0.2 %
LDLC SERPL CALC-MCNC: 80 MG/DL
LYMPHOCYTES # BLD AUTO: 2.39 K/UL
LYMPHOCYTES NFR BLD AUTO: 42.5 %
MAN DIFF?: NORMAL
MCHC RBC-ENTMCNC: 30.4 PG
MCHC RBC-ENTMCNC: 30.6 GM/DL
MCV RBC AUTO: 99.3 FL
MONOCYTES # BLD AUTO: 0.58 K/UL
MONOCYTES NFR BLD AUTO: 10.3 %
NEUTROPHILS # BLD AUTO: 2.48 K/UL
NEUTROPHILS NFR BLD AUTO: 43.9 %
NONHDLC SERPL-MCNC: 100 MG/DL
PLATELET # BLD AUTO: 519 K/UL
POTASSIUM SERPL-SCNC: 4.6 MMOL/L
PROT SERPL-MCNC: 7.4 G/DL
RBC # BLD: 4.34 M/UL
RBC # FLD: 13.9 %
SODIUM SERPL-SCNC: 141 MMOL/L
TRIGL SERPL-MCNC: 105 MG/DL
TSH SERPL-ACNC: 2.22 UIU/ML
VIT B12 SERPL-MCNC: >2000 PG/ML
WBC # FLD AUTO: 5.63 K/UL

## 2024-10-30 ENCOUNTER — APPOINTMENT (OUTPATIENT)
Dept: FAMILY MEDICINE | Facility: CLINIC | Age: 24
End: 2024-10-30
Payer: MEDICARE

## 2024-10-30 PROCEDURE — 90656 IIV3 VACC NO PRSV 0.5 ML IM: CPT

## 2024-10-30 PROCEDURE — G0008: CPT

## 2024-12-26 NOTE — ASSESSMENT
[FreeTextEntry1] : She has a history of insulin dependent diabetes, eczema, ADD, autism, depression, hyperlipidemia, PCOS, and vitamin D deficiency. She was recently prescribed Questran and Align for chronic diarrhea. She has since developed intermittent constipation. She has an ray on her phone to track her bowel movements. Reviewing this there seems to be no definite pattern. She has constipation some days, formed bowel movements other days, and still has loose stools some days. She is scheduled to see Dr. Ireland soon to address this.  She has a 4 cm laceration on the crown of her head closed with 7 surgical staples. The wound is well approximated and clean with no evidence of infection.  The nurse from her group home requested refills for her Flonase and vitamin C, and orders to discontinued Pyridium and Diphenhydramine.   
DISPLAY PLAN FREE TEXT

## 2025-02-11 ENCOUNTER — APPOINTMENT (OUTPATIENT)
Dept: FAMILY MEDICINE | Facility: CLINIC | Age: 25
End: 2025-02-11
Payer: MEDICARE

## 2025-02-11 VITALS
DIASTOLIC BLOOD PRESSURE: 68 MMHG | TEMPERATURE: 97.2 F | HEART RATE: 79 BPM | BODY MASS INDEX: 28.13 KG/M2 | HEIGHT: 61 IN | SYSTOLIC BLOOD PRESSURE: 108 MMHG | WEIGHT: 149 LBS | OXYGEN SATURATION: 96 %

## 2025-02-11 DIAGNOSIS — A08.4 VIRAL INTESTINAL INFECTION, UNSPECIFIED: ICD-10-CM

## 2025-02-11 DIAGNOSIS — R21 RASH AND OTHER NONSPECIFIC SKIN ERUPTION: ICD-10-CM

## 2025-02-11 PROCEDURE — 99213 OFFICE O/P EST LOW 20 MIN: CPT

## 2025-02-11 RX ORDER — CHOLESTYRAMINE 4 G/5.5G
POWDER, FOR SUSPENSION ORAL
Refills: 0 | Status: ACTIVE | COMMUNITY

## 2025-04-24 RX ORDER — SODIUM FLUORIDE 0.1 MG/ML
RINSE ORAL DAILY
Qty: 1 | Refills: 2 | Status: ACTIVE | COMMUNITY
Start: 2025-04-24 | End: 1900-01-01

## 2025-04-30 ENCOUNTER — APPOINTMENT (OUTPATIENT)
Dept: FAMILY MEDICINE | Facility: CLINIC | Age: 25
End: 2025-04-30
Payer: MEDICARE

## 2025-04-30 VITALS
HEART RATE: 79 BPM | BODY MASS INDEX: 27.19 KG/M2 | OXYGEN SATURATION: 98 % | HEIGHT: 61 IN | WEIGHT: 144 LBS | TEMPERATURE: 97.2 F | DIASTOLIC BLOOD PRESSURE: 64 MMHG | SYSTOLIC BLOOD PRESSURE: 110 MMHG

## 2025-04-30 DIAGNOSIS — K86.81 EXOCRINE PANCREATIC INSUFFICIENCY: ICD-10-CM

## 2025-04-30 DIAGNOSIS — F98.8 OTHER SPECIFIED BEHAVIORAL AND EMOTIONAL DISORDERS WITH ONSET USUALLY OCCURRING IN CHILDHOOD AND ADOLESCENCE: ICD-10-CM

## 2025-04-30 DIAGNOSIS — Z79.4 TYPE 2 DIABETES MELLITUS W/OUT COMPLICATIONS: ICD-10-CM

## 2025-04-30 DIAGNOSIS — K52.9 NONINFECTIVE GASTROENTERITIS AND COLITIS, UNSPECIFIED: ICD-10-CM

## 2025-04-30 DIAGNOSIS — Z86.69 PERSONAL HISTORY OF OTHER DISEASES OF THE NERVOUS SYSTEM AND SENSE ORGANS: ICD-10-CM

## 2025-04-30 DIAGNOSIS — E78.5 HYPERLIPIDEMIA, UNSPECIFIED: ICD-10-CM

## 2025-04-30 DIAGNOSIS — E11.9 TYPE 2 DIABETES MELLITUS W/OUT COMPLICATIONS: ICD-10-CM

## 2025-04-30 DIAGNOSIS — F84.0 AUTISTIC DISORDER: ICD-10-CM

## 2025-04-30 PROCEDURE — G2211 COMPLEX E/M VISIT ADD ON: CPT

## 2025-04-30 PROCEDURE — 99214 OFFICE O/P EST MOD 30 MIN: CPT

## 2025-04-30 RX ORDER — CHOLESTYRAMINE
POWDER (GRAM) MISCELLANEOUS
Refills: 0 | Status: ACTIVE | COMMUNITY

## 2025-04-30 RX ORDER — PANCRELIPASE LIPASE, PANCRELIPASE PROTEASE, PANCRELIPASE AMYLASE 40000; 126000; 168000 [USP'U]/1; [USP'U]/1; [USP'U]/1
40000-126000 CAPSULE, DELAYED RELEASE ORAL
Refills: 0 | Status: ACTIVE | COMMUNITY

## 2025-05-02 RX ORDER — CHOLESTYRAMINE 4 G/9G
4 POWDER, FOR SUSPENSION ORAL
Qty: 1 | Refills: 3 | Status: ACTIVE | COMMUNITY
Start: 2025-04-30

## 2025-05-20 ENCOUNTER — APPOINTMENT (OUTPATIENT)
Dept: NEUROLOGY | Facility: CLINIC | Age: 25
End: 2025-05-20
Payer: MEDICARE

## 2025-05-20 VITALS
SYSTOLIC BLOOD PRESSURE: 112 MMHG | TEMPERATURE: 98.2 F | HEIGHT: 61 IN | WEIGHT: 144 LBS | HEART RATE: 76 BPM | DIASTOLIC BLOOD PRESSURE: 63 MMHG | BODY MASS INDEX: 27.19 KG/M2

## 2025-05-20 DIAGNOSIS — Z86.69 PERSONAL HISTORY OF OTHER DISEASES OF THE NERVOUS SYSTEM AND SENSE ORGANS: ICD-10-CM

## 2025-05-20 PROCEDURE — 99204 OFFICE O/P NEW MOD 45 MIN: CPT

## 2025-05-30 ENCOUNTER — APPOINTMENT (OUTPATIENT)
Dept: NEUROLOGY | Facility: CLINIC | Age: 25
End: 2025-05-30
Payer: MEDICARE

## 2025-05-30 PROCEDURE — 95816 EEG AWAKE AND DROWSY: CPT

## 2025-06-02 ENCOUNTER — NON-APPOINTMENT (OUTPATIENT)
Age: 25
End: 2025-06-02

## 2025-06-12 RX ORDER — CHOLESTYRAMINE 4 G/9G
4 POWDER, FOR SUSPENSION ORAL
Qty: 1 | Refills: 1 | Status: ACTIVE | COMMUNITY
Start: 2025-06-12 | End: 1900-01-01

## 2025-07-30 ENCOUNTER — APPOINTMENT (OUTPATIENT)
Dept: FAMILY MEDICINE | Facility: CLINIC | Age: 25
End: 2025-07-30
Payer: MEDICARE

## 2025-07-30 VITALS
TEMPERATURE: 97.1 F | HEART RATE: 94 BPM | HEIGHT: 61 IN | SYSTOLIC BLOOD PRESSURE: 120 MMHG | WEIGHT: 144 LBS | BODY MASS INDEX: 27.19 KG/M2 | DIASTOLIC BLOOD PRESSURE: 70 MMHG | OXYGEN SATURATION: 96 %

## 2025-07-30 DIAGNOSIS — F98.8 OTHER SPECIFIED BEHAVIORAL AND EMOTIONAL DISORDERS WITH ONSET USUALLY OCCURRING IN CHILDHOOD AND ADOLESCENCE: ICD-10-CM

## 2025-07-30 DIAGNOSIS — E55.9 VITAMIN D DEFICIENCY, UNSPECIFIED: ICD-10-CM

## 2025-07-30 DIAGNOSIS — L30.9 DERMATITIS, UNSPECIFIED: ICD-10-CM

## 2025-07-30 DIAGNOSIS — E78.5 HYPERLIPIDEMIA, UNSPECIFIED: ICD-10-CM

## 2025-07-30 DIAGNOSIS — F32.9 MAJOR DEPRESSIVE DISORDER, SINGLE EPISODE, UNSPECIFIED: ICD-10-CM

## 2025-07-30 DIAGNOSIS — F84.0 AUTISTIC DISORDER: ICD-10-CM

## 2025-07-30 DIAGNOSIS — Z00.00 ENCOUNTER FOR GENERAL ADULT MEDICAL EXAMINATION W/OUT ABNORMAL FINDINGS: ICD-10-CM

## 2025-07-30 DIAGNOSIS — E11.9 TYPE 2 DIABETES MELLITUS W/OUT COMPLICATIONS: ICD-10-CM

## 2025-07-30 DIAGNOSIS — K86.89 OTHER SPECIFIED DISEASES OF PANCREAS: ICD-10-CM

## 2025-07-30 DIAGNOSIS — Z79.4 TYPE 2 DIABETES MELLITUS W/OUT COMPLICATIONS: ICD-10-CM

## 2025-07-30 PROCEDURE — G0439: CPT
